# Patient Record
Sex: MALE | Race: WHITE | NOT HISPANIC OR LATINO | URBAN - METROPOLITAN AREA
[De-identification: names, ages, dates, MRNs, and addresses within clinical notes are randomized per-mention and may not be internally consistent; named-entity substitution may affect disease eponyms.]

---

## 2021-06-12 ENCOUNTER — INPATIENT (INPATIENT)
Facility: HOSPITAL | Age: 61
LOS: 3 days | Discharge: NURSING, REHAB OR LONG-TERM CARE | DRG: 389 | End: 2021-06-16
Attending: FAMILY MEDICINE | Admitting: FAMILY MEDICINE
Payer: MEDICARE

## 2021-06-12 VITALS
HEART RATE: 89 BPM | RESPIRATION RATE: 18 BRPM | OXYGEN SATURATION: 96 % | TEMPERATURE: 98 F | SYSTOLIC BLOOD PRESSURE: 150 MMHG | DIASTOLIC BLOOD PRESSURE: 89 MMHG | HEIGHT: 67 IN | WEIGHT: 210.1 LBS

## 2021-06-12 DIAGNOSIS — K56.7 ILEUS, UNSPECIFIED: ICD-10-CM

## 2021-06-12 DIAGNOSIS — R10.9 UNSPECIFIED ABDOMINAL PAIN: ICD-10-CM

## 2021-06-12 LAB
ALBUMIN SERPL ELPH-MCNC: 3.5 G/DL — SIGNIFICANT CHANGE UP (ref 3.3–5)
ALP SERPL-CCNC: 78 U/L — SIGNIFICANT CHANGE UP (ref 40–120)
ALT FLD-CCNC: 16 U/L — SIGNIFICANT CHANGE UP (ref 12–78)
ANION GAP SERPL CALC-SCNC: 5 MMOL/L — SIGNIFICANT CHANGE UP (ref 5–17)
APPEARANCE UR: CLEAR — SIGNIFICANT CHANGE UP
AST SERPL-CCNC: 33 U/L — SIGNIFICANT CHANGE UP (ref 15–37)
BASOPHILS # BLD AUTO: 0.03 K/UL — SIGNIFICANT CHANGE UP (ref 0–0.2)
BASOPHILS NFR BLD AUTO: 0.5 % — SIGNIFICANT CHANGE UP (ref 0–2)
BILIRUB SERPL-MCNC: 0.4 MG/DL — SIGNIFICANT CHANGE UP (ref 0.2–1.2)
BILIRUB UR-MCNC: NEGATIVE — SIGNIFICANT CHANGE UP
BUN SERPL-MCNC: 24 MG/DL — HIGH (ref 7–23)
CALCIUM SERPL-MCNC: 8.6 MG/DL — SIGNIFICANT CHANGE UP (ref 8.5–10.1)
CHLORIDE SERPL-SCNC: 108 MMOL/L — SIGNIFICANT CHANGE UP (ref 96–108)
CO2 SERPL-SCNC: 27 MMOL/L — SIGNIFICANT CHANGE UP (ref 22–31)
COLOR SPEC: YELLOW — SIGNIFICANT CHANGE UP
CREAT SERPL-MCNC: 1.1 MG/DL — SIGNIFICANT CHANGE UP (ref 0.5–1.3)
DIFF PNL FLD: NEGATIVE — SIGNIFICANT CHANGE UP
EOSINOPHIL # BLD AUTO: 0.02 K/UL — SIGNIFICANT CHANGE UP (ref 0–0.5)
EOSINOPHIL NFR BLD AUTO: 0.3 % — SIGNIFICANT CHANGE UP (ref 0–6)
GLUCOSE SERPL-MCNC: 118 MG/DL — HIGH (ref 70–99)
GLUCOSE UR QL: NEGATIVE — SIGNIFICANT CHANGE UP
HCT VFR BLD CALC: 50.9 % — HIGH (ref 39–50)
HGB BLD-MCNC: 16.7 G/DL — SIGNIFICANT CHANGE UP (ref 13–17)
IMM GRANULOCYTES NFR BLD AUTO: 0.6 % — SIGNIFICANT CHANGE UP (ref 0–1.5)
KETONES UR-MCNC: NEGATIVE — SIGNIFICANT CHANGE UP
LEUKOCYTE ESTERASE UR-ACNC: NEGATIVE — SIGNIFICANT CHANGE UP
LIDOCAIN IGE QN: 45 U/L — LOW (ref 73–393)
LYMPHOCYTES # BLD AUTO: 0.95 K/UL — LOW (ref 1–3.3)
LYMPHOCYTES # BLD AUTO: 14.6 % — SIGNIFICANT CHANGE UP (ref 13–44)
MCHC RBC-ENTMCNC: 30.4 PG — SIGNIFICANT CHANGE UP (ref 27–34)
MCHC RBC-ENTMCNC: 32.8 GM/DL — SIGNIFICANT CHANGE UP (ref 32–36)
MCV RBC AUTO: 92.5 FL — SIGNIFICANT CHANGE UP (ref 80–100)
MONOCYTES # BLD AUTO: 1 K/UL — HIGH (ref 0–0.9)
MONOCYTES NFR BLD AUTO: 15.4 % — HIGH (ref 2–14)
NEUTROPHILS # BLD AUTO: 4.45 K/UL — SIGNIFICANT CHANGE UP (ref 1.8–7.4)
NEUTROPHILS NFR BLD AUTO: 68.6 % — SIGNIFICANT CHANGE UP (ref 43–77)
NITRITE UR-MCNC: NEGATIVE — SIGNIFICANT CHANGE UP
NRBC # BLD: 0 /100 WBCS — SIGNIFICANT CHANGE UP (ref 0–0)
PH UR: 5 — SIGNIFICANT CHANGE UP (ref 5–8)
PLATELET # BLD AUTO: 195 K/UL — SIGNIFICANT CHANGE UP (ref 150–400)
POTASSIUM SERPL-MCNC: 4 MMOL/L — SIGNIFICANT CHANGE UP (ref 3.5–5.3)
POTASSIUM SERPL-SCNC: 4 MMOL/L — SIGNIFICANT CHANGE UP (ref 3.5–5.3)
PROT SERPL-MCNC: 8.6 G/DL — HIGH (ref 6–8.3)
PROT UR-MCNC: NEGATIVE — SIGNIFICANT CHANGE UP
RBC # BLD: 5.5 M/UL — SIGNIFICANT CHANGE UP (ref 4.2–5.8)
RBC # FLD: 13.8 % — SIGNIFICANT CHANGE UP (ref 10.3–14.5)
SARS-COV-2 RNA SPEC QL NAA+PROBE: SIGNIFICANT CHANGE UP
SODIUM SERPL-SCNC: 140 MMOL/L — SIGNIFICANT CHANGE UP (ref 135–145)
SP GR SPEC: 1.01 — SIGNIFICANT CHANGE UP (ref 1.01–1.02)
UROBILINOGEN FLD QL: NEGATIVE — SIGNIFICANT CHANGE UP
WBC # BLD: 6.49 K/UL — SIGNIFICANT CHANGE UP (ref 3.8–10.5)
WBC # FLD AUTO: 6.49 K/UL — SIGNIFICANT CHANGE UP (ref 3.8–10.5)

## 2021-06-12 PROCEDURE — 74176 CT ABD & PELVIS W/O CONTRAST: CPT | Mod: 26,MA

## 2021-06-12 PROCEDURE — 71045 X-RAY EXAM CHEST 1 VIEW: CPT | Mod: 26

## 2021-06-12 PROCEDURE — 76705 ECHO EXAM OF ABDOMEN: CPT | Mod: 26

## 2021-06-12 PROCEDURE — 99285 EMERGENCY DEPT VISIT HI MDM: CPT

## 2021-06-12 RX ORDER — DEXTROSE 50 % IN WATER 50 %
25 SYRINGE (ML) INTRAVENOUS ONCE
Refills: 0 | Status: DISCONTINUED | OUTPATIENT
Start: 2021-06-12 | End: 2021-06-16

## 2021-06-12 RX ORDER — DEXTROSE 50 % IN WATER 50 %
12.5 SYRINGE (ML) INTRAVENOUS ONCE
Refills: 0 | Status: DISCONTINUED | OUTPATIENT
Start: 2021-06-12 | End: 2021-06-16

## 2021-06-12 RX ORDER — OXYBUTYNIN CHLORIDE 5 MG
10 TABLET ORAL DAILY
Refills: 0 | Status: DISCONTINUED | OUTPATIENT
Start: 2021-06-12 | End: 2021-06-16

## 2021-06-12 RX ORDER — SODIUM CHLORIDE 9 MG/ML
1000 INJECTION INTRAMUSCULAR; INTRAVENOUS; SUBCUTANEOUS ONCE
Refills: 0 | Status: COMPLETED | OUTPATIENT
Start: 2021-06-12 | End: 2021-06-12

## 2021-06-12 RX ORDER — ONDANSETRON 8 MG/1
4 TABLET, FILM COATED ORAL ONCE
Refills: 0 | Status: COMPLETED | OUTPATIENT
Start: 2021-06-12 | End: 2021-06-12

## 2021-06-12 RX ORDER — PANTOPRAZOLE SODIUM 20 MG/1
40 TABLET, DELAYED RELEASE ORAL DAILY
Refills: 0 | Status: DISCONTINUED | OUTPATIENT
Start: 2021-06-12 | End: 2021-06-15

## 2021-06-12 RX ORDER — IOHEXOL 300 MG/ML
30 INJECTION, SOLUTION INTRAVENOUS ONCE
Refills: 0 | Status: COMPLETED | OUTPATIENT
Start: 2021-06-12 | End: 2021-06-12

## 2021-06-12 RX ORDER — DEXTROSE 50 % IN WATER 50 %
15 SYRINGE (ML) INTRAVENOUS ONCE
Refills: 0 | Status: DISCONTINUED | OUTPATIENT
Start: 2021-06-12 | End: 2021-06-16

## 2021-06-12 RX ORDER — VALPROIC ACID (AS SODIUM SALT) 250 MG/5ML
1000 SOLUTION, ORAL ORAL EVERY 8 HOURS
Refills: 0 | Status: DISCONTINUED | OUTPATIENT
Start: 2021-06-12 | End: 2021-06-12

## 2021-06-12 RX ORDER — OLANZAPINE 15 MG/1
10 TABLET, FILM COATED ORAL DAILY
Refills: 0 | Status: DISCONTINUED | OUTPATIENT
Start: 2021-06-12 | End: 2021-06-13

## 2021-06-12 RX ORDER — FAMOTIDINE 10 MG/ML
20 INJECTION INTRAVENOUS ONCE
Refills: 0 | Status: COMPLETED | OUTPATIENT
Start: 2021-06-12 | End: 2021-06-12

## 2021-06-12 RX ORDER — ONDANSETRON 8 MG/1
4 TABLET, FILM COATED ORAL
Refills: 0 | Status: DISCONTINUED | OUTPATIENT
Start: 2021-06-12 | End: 2021-06-16

## 2021-06-12 RX ORDER — GLUCAGON INJECTION, SOLUTION 0.5 MG/.1ML
1 INJECTION, SOLUTION SUBCUTANEOUS ONCE
Refills: 0 | Status: DISCONTINUED | OUTPATIENT
Start: 2021-06-12 | End: 2021-06-16

## 2021-06-12 RX ORDER — SODIUM CHLORIDE 9 MG/ML
1000 INJECTION, SOLUTION INTRAVENOUS
Refills: 0 | Status: DISCONTINUED | OUTPATIENT
Start: 2021-06-12 | End: 2021-06-16

## 2021-06-12 RX ORDER — VALPROIC ACID (AS SODIUM SALT) 250 MG/5ML
750 SOLUTION, ORAL ORAL EVERY 8 HOURS
Refills: 0 | Status: DISCONTINUED | OUTPATIENT
Start: 2021-06-12 | End: 2021-06-15

## 2021-06-12 RX ORDER — CARBAMAZEPINE 200 MG
400 TABLET ORAL
Refills: 0 | Status: DISCONTINUED | OUTPATIENT
Start: 2021-06-12 | End: 2021-06-16

## 2021-06-12 RX ORDER — LEVOTHYROXINE SODIUM 125 MCG
100 TABLET ORAL DAILY
Refills: 0 | Status: DISCONTINUED | OUTPATIENT
Start: 2021-06-12 | End: 2021-06-13

## 2021-06-12 RX ORDER — ARIPIPRAZOLE 15 MG/1
5 TABLET ORAL DAILY
Refills: 0 | Status: DISCONTINUED | OUTPATIENT
Start: 2021-06-12 | End: 2021-06-13

## 2021-06-12 RX ORDER — SODIUM CHLORIDE 9 MG/ML
1000 INJECTION, SOLUTION INTRAVENOUS
Refills: 0 | Status: DISCONTINUED | OUTPATIENT
Start: 2021-06-12 | End: 2021-06-14

## 2021-06-12 RX ORDER — ENOXAPARIN SODIUM 100 MG/ML
40 INJECTION SUBCUTANEOUS DAILY
Refills: 0 | Status: DISCONTINUED | OUTPATIENT
Start: 2021-06-12 | End: 2021-06-16

## 2021-06-12 RX ADMIN — ONDANSETRON 4 MILLIGRAM(S): 8 TABLET, FILM COATED ORAL at 11:52

## 2021-06-12 RX ADMIN — Medication 400 MILLIGRAM(S): at 18:31

## 2021-06-12 RX ADMIN — SODIUM CHLORIDE 100 MILLILITER(S): 9 INJECTION, SOLUTION INTRAVENOUS at 21:09

## 2021-06-12 RX ADMIN — ENOXAPARIN SODIUM 40 MILLIGRAM(S): 100 INJECTION SUBCUTANEOUS at 18:30

## 2021-06-12 RX ADMIN — IOHEXOL 30 MILLILITER(S): 300 INJECTION, SOLUTION INTRAVENOUS at 11:53

## 2021-06-12 RX ADMIN — SODIUM CHLORIDE 1000 MILLILITER(S): 9 INJECTION INTRAMUSCULAR; INTRAVENOUS; SUBCUTANEOUS at 11:53

## 2021-06-12 RX ADMIN — Medication 28.75 MILLIGRAM(S): at 21:09

## 2021-06-12 RX ADMIN — FAMOTIDINE 20 MILLIGRAM(S): 10 INJECTION INTRAVENOUS at 11:52

## 2021-06-12 RX ADMIN — SODIUM CHLORIDE 1000 MILLILITER(S): 9 INJECTION INTRAMUSCULAR; INTRAVENOUS; SUBCUTANEOUS at 13:15

## 2021-06-12 RX ADMIN — SODIUM CHLORIDE 100 MILLILITER(S): 9 INJECTION, SOLUTION INTRAVENOUS at 19:43

## 2021-06-12 NOTE — ED PROVIDER NOTE - OBJECTIVE STATEMENT
60 male presents to ER from group home by ambulance with report of abdominal pain, vomiting. Patient states on Thursday devloped "stomach upset", had 2 episodes of vomiting. 60 male from presents to ER from [facility] PMD Dr. Tohsa Delaney by ambulance with report of abdominal pain, vomiting. Patient states on Thursday devloped "stomach upset", had 2 episodes of vomiting.

## 2021-06-12 NOTE — CONSULT NOTE ADULT - ASSESSMENT
sbo  ileus    plan  f/u axr in am  zofran and reglan  surgery on the case  ppi once a day  gerd precautions  to follow up closely  may need colonosopcy to be done as outpt

## 2021-06-12 NOTE — H&P ADULT - HISTORY OF PRESENT ILLNESS
60 male pmhx of bipolar disorder, mild intellectual developmental disability, anxiety disorder, hypothyroidism, HLD, Vit D deficiency, GERD, osteopenia, BPH, IBS, lumbago, and eczema who presents today after 2 bouts of vomiting yesterday, and some mild abdominal pain.  He was a resident of an [facility] group home in [place], now lives with his mother for the past few months.  Patient displays mildly slowed mentation 2/2 his history, but is A&O x 3 and cooperative.  He states that the pain began a few days ago, while vomiting began last night - vomitus described as "clear-light colored" (denies blood/coffee-ground appearance).  Currently, states that abdominal pain has resolved.  When he was experiencing it, it was described as crampy, was felt in the upper quadrants, did not radiate, and rated 5/10.  Last BM was a few days ago, and admits to passing flatus all along.   Pt has been seen by gi and surgery   Asked to admit pt 6/12/2021     WORKUP   6/12/2021 W 6.3 Hb 16.7  6/12/2021 Na 140 K 4 CO2 27 Cr 1.1   LFTS 6/12/2021   ap 78  ast 33  alt 16   Lipase 6/12/2021 lipase 45     Home meds.   Tamsulosin  Vit D3  Levothyroxine  Olanzapine  Oxybutynin  Aripiprazole  Carbamazepine  Divalproex  Calcium with vit D  Fish oil  cetaphil lotion

## 2021-06-12 NOTE — H&P ADULT - NSHPREVIEWOFSYSTEMS_GEN_ALL_CORE
· GASTROINTESTINAL: - - -  · Gastrointestinal [+]: ABDOMINAL PAIN, VOMITING  · ROS STATEMENT: all other ROS negative except as per HPI

## 2021-06-12 NOTE — H&P ADULT - NSHPPHYSICALEXAM_GEN_ALL_CORE
· CONSTITUTIONAL: Well appearing, awake, alert, oriented to person, place, time/situation and in no apparent distress.  · CARDIAC: Normal rate, regular rhythm.  Heart sounds S1, S2.  No murmurs, rubs or gallops.  · RESPIRATORY: Breath sounds clear and equal bilaterally.  · GASTROINTESTINAL: Abdomen soft, non-tender, no guarding.  · MUSCULOSKELETAL: Spine appears normal, range of motion is not limited, no muscle or joint tenderness  · NEUROLOGICAL: Alert and oriented, no focal deficits, no motor or sensory deficits.  · SKIN: Skin normal color for race, warm, dry and intact. No evidence of rash.

## 2021-06-12 NOTE — CONSULT NOTE ADULT - SUBJECTIVE AND OBJECTIVE BOX
Chief Complaint:  Patient is a 60y old  Male who presents with a chief complaint of abd pain and ileus ?  · Chief Complaint: The patient is a 60y Male complaining of abdominal pain.  · HPI Objective Statement: 60 male from presents to ER from group home [facility] PMD Dr. Tosha Delaney by ambulance with report of abdominal pain, vomiting. Patient states on Thursday devloped "stomach upset", had 2 episodes of vomiting.    · Presenting Symptoms: PAIN, VOMITING  · Negative Findings: no blood in stool, no dysuria, no fever  · Location: epigastric area  · Area: generalized  · Radiation: no radiation  · Timing: gradual onset  · Duration: day(s)  2  · Quality: aching  · Severity: MODERATE  · Context: unknown  · Recent Exposure To: none known  · Aggravated Factors: none  · Relieving Factors: none      Allergies:  No Known Allergies      Medications:      PMHX/PSHX:  No pertinent past medical history        Family history:    no uc no cd     Social History:   no etoh no cigs no ivda   lives in group home    ROS:     General:  No wt loss, fevers, chills, night sweats, fatigue,   Eyes:  Good vision, no reported pain  ENT:  No sore throat, pain, runny nose, dysphagia  CV:  No pain, palpitations, hypo/hypertension  Resp:  No dyspnea, cough, tachypnea, wheezing  GI:  No pain, No nausea, No vomiting, No diarrhea, No constipation, No weight loss, No fever, No pruritis, No rectal bleeding, No tarry stools, No dysphagia,  :  No pain, bleeding, incontinence, nocturia  Muscle:  No pain, weakness  Neuro:  No weakness, tingling, memory problems  Psych:  No fatigue, insomnia, mood problems, depression  Endocrine:  No polyuria, polydipsia, cold/heat intolerance  Heme:  No petechiae, ecchymosis, easy bruisability  Skin:  No rash, tattoos, scars, edema      PHYSICAL EXAM:   Vital Signs:  Vital Signs Last 24 Hrs  T(C): 36.6 (12 Jun 2021 11:10), Max: 36.6 (12 Jun 2021 11:10)  T(F): 97.9 (12 Jun 2021 11:10), Max: 97.9 (12 Jun 2021 11:10)  HR: 89 (12 Jun 2021 11:10) (89 - 89)  BP: 150/89 (12 Jun 2021 11:10) (150/89 - 150/89)  BP(mean): --  RR: 18 (12 Jun 2021 11:10) (18 - 18)  SpO2: 96% (12 Jun 2021 11:10) (96% - 96%)  Daily Height in cm: 170.18 (12 Jun 2021 11:10)    Daily     GENERAL:  Appears stated age, well-groomed, well-nourished, no distress  HEENT:  NC/AT,  conjunctivae clear and pink, no thyromegaly, nodules, adenopathy, no JVD, sclera -anicteric  CHEST:  Full & symmetric excursion, no increased effort, breath sounds clear  HEART:  Regular rhythm, S1, S2, no murmur/rub/S3/S4, no abdominal bruit, no edema  ABDOMEN:  Soft, non-tender, non-distended, normoactive bowel sounds,  no masses ,no hepato-splenomegaly, no signs of chronic liver disease  EXTEREMITIES:  no cyanosis,clubbing or edema  SKIN:  No rash/erythema/ecchymoses/petechiae/wounds/abscess/warm/dry  NEURO:  Alert, oriented, no asterixis, no tremor, no encephalopathy    LABS:                        16.7   6.49  )-----------( 195      ( 12 Jun 2021 11:47 )             50.9     06-12    140  |  108  |  24<H>  ----------------------------<  118<H>  4.0   |  27  |  1.10    Ca    8.6      12 Jun 2021 11:47    TPro  8.6<H>  /  Alb  3.5  /  TBili  0.4  /  DBili  x   /  AST  33  /  ALT  16  /  AlkPhos  78  06-12    LIVER FUNCTIONS - ( 12 Jun 2021 11:47 )  Alb: 3.5 g/dL / Pro: 8.6 g/dL / ALK PHOS: 78 U/L / ALT: 16 U/L / AST: 33 U/L / GGT: x               Amylase Serum--      Lipase serum45       Ammonia--      Imaging:          
SURGERY PA CONSULT NOTE:    CHIEF COMPLAINT:  Patient is a 60-yo old male who presents with a chief complaint of abdominal pain and vomiting    HPI:   This is a pleasant 61 yo male with pmhx of bipolar disorder, mild intellectual developmental disability, anxiety disorder, hypothyroidism, HLD, Vit D deficiency, GERD, osteopenia, BPH, IBS, lumbago, and eczema who presents today after 2 bouts of vomiting yesterday, and some mild abdominal pain.  He was a resident of an [facility] group home in [place], now lives with his mother for the past few months.  Patient displays mildly slowed mentation 2/2 his history, but is A&O x 3 and cooperative.  He states that the pain began a few days ago, while vomiting began last night - vomitus described as "clear-light colored" (denies blood/coffee-ground appearance).  Currently, states that abdominal pain has resolved.  When he was experiencing it, it was described as crampy, was felt in the upper quadrants, did not radiate, and rated 5/10.  Last BM was a few days ago, and admits to passing flatus all along.      PAST MEDICAL HISTORY:  Mild Intellectual Developmental Disability  Bipolar Disorder  Anxiety Disorder  Hypothyroidism  Hyperlipidemia  Vit D Deficiency  GERD  Osteopenia  Urinary frequency/urge incontinence  Periodontal disease  Mild nearsightedness  Mild cataracts  Vitreal floaters  BPH  High frequency sensorineural hearing loss  Internal hemorrhoids  IBS  Lumbago  Eczema  Irritated seborrheic keratosis  Solar Lentigo    PAST SURGICAL HISTORY:  No significant surgical history reported    REVIEW OF SYSTEMS:  General/Constitutional: No acute distress, no weakness, fevers, or chills - +awake and alert  HEENT: Denies auditory or visual changes/disturbances, no vertigo, no throat pain, no dysphagia    Neck: Denies neck pain/stiffness, denies swelling/lumps/hoarseness   Lymphatic: Denies lumps or swelling in the axillae, groin, or neck bilaterally   Respiratory: Denies cough/hemoptysis, denies wheezing/SOB/dyspnea  Cardiac: Denies chest pain, palpitations  Abdomen: Denies abdominal bloating/fullness, nausea or vomiting, denies abdominal pain  Extremities: Denies sores, swelling, discoloration bilat UE/LE  Genitourinary: Denies urinary issues or complaints, denies dysuria/hematuria  Neuro: Denies weakness, paraesthesias, paralysis, syncope, loss of vision  Skin: Denies pruritus, pain, rashes  Psych: Denies hallucinations, visual disturbances, or depression    MEDICATIONS:  Tamsulosin  Vit D3  Levothyroxine  Olanzapine  Oxybutynin  Aripiprazole  Carbamazepine  Divalproex  Calcium with vit D  Fish oil  cetaphil lotion    ALLERGIES:  No Known Allergies    SOCIAL HISTORY:  Currently lives at home with mother  Visits [facility] 3x a week  Denies smoking hx  Denies ETOH or illicit drug use  Independently performs ADL's    FAMILY HISTORY:  Non-contributory     VITAL SIGNS:  Vital Signs Last 24 Hrs  T(C): 36.6 (12 Jun 2021 11:10), Max: 36.6 (12 Jun 2021 11:10)  T(F): 97.9 (12 Jun 2021 11:10), Max: 97.9 (12 Jun 2021 11:10)  HR: 89 (12 Jun 2021 11:10) (89 - 89)  BP: 150/89 (12 Jun 2021 11:10) (150/89 - 150/89)  RR: 18 (12 Jun 2021 11:10) (18 - 18)  SpO2: 96% (12 Jun 2021 11:10) (96% - 96%)    PHYSICAL EXAM:  General: No acute distress, appears comfortable, conversant, well nourished, well-groomed, appears stated age  Head, Eyes, Ears, Nose, Throat: Normal cephalic/atraumatic, VASQUEZ, EOMI, , anicteric, conjunctiva non injected and moist, vision grossly intact, hearing grossly intact, no nasal discharge, ears and nose symmetrical and atraumatic.  Nasal, oral, and oropharyngeal mucosa pink moist with no evidence of ulceration  Neck: Supple, carotids have good upstroke, trachea in the midline, without JVD or thyromegaly  Lymphatic: No evidence of masses or lymphadenopathy in the head, neck, trunk, axillary, inguinal, or supraclavicular regions  Chest: Lungs are clear to P&A, no wheezing, no rales, no ronchi, with good inspiratory effort  Heart: Heart rhythm regular, no murmurs  Abdomen: Mild tympani to percussion upper quadrants/epigastrium.  Soft, non tender throughout, good bowel sounds present in all four quadrants.  No guarding, rebound, and no peritoneal signs.  No evidence of hepatosplenomegaly.  No evidence of abdominal wall hernias.  Inguinal regions are unremarkable with no evidence of hernias.   Extremity: No swelling, or open sores, no gross deformities,  good range of motion, 2+ peripheral pulses bilat UE/LE, no edema,  negative Josh's sign, no lymphadenopathy  Neuro: Alert and oriented x3, motor and sensory intact  Psychiatric: Awake , alert, oriented x3 with an appropriate affect.   Skin: Good color, turgor, texture with no gross lesions, no eruptions, no rashes, no subcutaneous nodules and normal temperature.     LABS:                        16.7   6.49  )-----------( 195      ( 12 Jun 2021 11:47 )             50.9     06-12    140  |  108  |  24<H>  ----------------------------<  118<H>  4.0   |  27  |  1.10    Ca    8.6      12 Jun 2021 11:47    TPro  8.6<H>  /  Alb  3.5  /  TBili  0.4  /  DBili  x   /  AST  33  /  ALT  16  /  AlkPhos  78  06-12    LIVER FUNCTIONS - ( 12 Jun 2021 11:47 )  Alb: 3.5 g/dL / Pro: 8.6 g/dL / ALK PHOS: 78 U/L / ALT: 16 U/L / AST: 33 U/L / GGT: x           RADIOLOGY & ADDITIONAL STUDIES:  EXAM:  CT ABDOMEN AND PELVIS OC                        PROCEDURE DATE:  06/12/2021    INTERPRETATION:  CLINICAL INFORMATION: Abdominal atelectatic changes at the lung bases. And vomiting  COMPARISON: None.  CONTRAST/COMPLICATIONS:  IVContrast: NONE  0 cc administered   0 cc discarded  Oral Contrast: Omnipaque 300  Complications: None reported at time of study completion  PROCEDURE:  CT of the Abdomen and Pelvis was performed.  Sagittal and coronal reformats were performed.  FINDINGS:  LOWER CHEST: Within normal limits.  LIVER: Within normal limits.  BILE DUCTS: Normal caliber.  GALLBLADDER: Within normal limits.  SPLEEN: Within normal limits.  PANCREAS: Within normal limits.  ADRENALS: Within normal limits.  KIDNEYS/URETERS: Renal cysts measuring up to 4.3 cm in the midpole on the left.  BLADDER: Within normal limits.  REPRODUCTIVE ORGANS: Grossly unremarkable  BOWEL: Mildly distended proximal small bowel which is normal in caliber distally although no discrete transition is identified. This may be due to an ileus versus mild small bowel obstruction without a visualized transition. Clinical correlation and follow-up is recommended. No evidence for appendicitis.  PERITONEUM: Trace fluid in the pelvis.  VESSELS: Within normal limits.  RETROPERITONEUM/LYMPH NODES: No lymphadenopathy.  ABDOMINAL WALL: Within normal limits.  BONES: Degenerative changes of bone.  IMPRESSION:  Mildly distended small bowel which is normal in caliber distally with trace fluid in the pelvis. I am not observing a clear transition point. This may be a mild small bowel obstruction with a nonvisualized transition point versus an ileus. Please correlate clinically and follow-up is recommended.  TRISTIAN WITT MD; Attending Radiologist  This document has been electronically signed. Jun 12 2021  2:04PM

## 2021-06-12 NOTE — H&P ADULT - PROBLEM SELECTOR PLAN 1
6/12/2021 GI recommends axr am zofran reglan ppi may need colon as outpt   6/12/2021 surgery suspects ileus gastroparesis gastroenteritis or ileus   surgery felt that sbo at best questionable and no hepatobiliry or pancreatic problems based on patient data   Surgery recommends ng if vomiting continuesand no surgicl intervn planned at this point

## 2021-06-12 NOTE — CONSULT NOTE ADULT - ATTENDING COMMENTS
Patient seen and examined with surgical PA.  He states that he is feeling well and denies abdominal pain or nausea.  He states that he has been passing flatus, however has not had a bowel movement.  He is hungry.  The CT scan images and report were reviewed and do not reveal any transition points to support a diagnosis of a mechanical small bowel obstruction.  As such, suspect the gastric and mild proximal small bowel dilation likely represents either an ileus related to his antipsychotic medications versus gastroenteritis.  Will therefore defer further management to GI who has also been consulted.

## 2021-06-12 NOTE — CONSULT NOTE ADULT - ASSESSMENT
61 yo male with pmhx of bipolar disorder, mild intellectual developmental disability, anxiety disorder, hypothyroidism, HLD, Vit D deficiency, GERD, osteopenia, BPH, IBS, lumbago, and eczema  Presents with abdominal pain (now resolved) and 2 episodes of vomiting last night   CT questionable for possible SBO?    PLAN:  Patient is currently stable, without complaints  Afebrile with non-concerning VS  No leukocytosis  No suspicion of hepato-biliary or pancreatic pathology based on CT scan and laboratory findings  CT questionable at best for SBO - no clear transition point, and no jeramy mechanical obstruction is evident   Differential includes medication-induced ileus/gastroparesis, gastro-enteritis, or ileus  No acute surgical intervention is indicated at this time  Would maintain NPO status  Consider NGT if nausea/vomiting recurs  Recommend Medicine evaluation, and GI consultation  Discussed with Dr. Bland  If admitted, will follow for now

## 2021-06-12 NOTE — ED ADULT NURSE REASSESSMENT NOTE - NS ED NURSE REASSESS COMMENT FT1
Received patient from JOHNNY Owen resting in stretcher. A/Ox4 with NAD noted. VSS.  at this time. Patient speaking in full sentences and denies pain at this time. Will continue to monitor. Call light in reach.

## 2021-06-12 NOTE — H&P ADULT - ASSESSMENT
NOBLE BONILLA 60 M [facility] P 6/12/2021 1960 DR GALEN POE       Initial evaluation/Admission HNP  requested on 6/12/2021   by Dr Peo     from Dr Hurtado   Patient examined chart reviewed    HOSPITAL ADMISSION   PATIENT CAME  FROM (if information available)      NOBLE BONILLA 60 M [facility] P 6/12/2021 1960 DR GALEN POE      REVIEW OF SYMPTOMS      Able to give (reliable) ROS  NO     PHYSICAL EXAM    HEENT Unremarkable  atraumatic   RESP Fair air entry EXP prolonged    Harsh breath sound Resp distres mild   CARDIAC S1 S2 No S3     NO JVD    ABDOMEN SOFT BS PRESENT NOT DISTENDED No hepatosplenomegaly   PEDAL EDEMA present No calf tenderness  NO rash     DICKEYFREDDY Cohn [facility] P 6/12/2021 1960 DR GALEN POE      REVIEW OF SYMPTOMS      Able to give (reliable) ROS  NO     PHYSICAL EXAM    HEENT Unremarkable  atraumatic   RESP Fair air entry EXP prolonged    Harsh breath sound Resp distres mild   CARDIAC S1 S2 No S3     NO JVD    ABDOMEN SOFT BS PRESENT NOT DISTENDED No hepatosplenomegaly   PEDAL EDEMA present No calf tenderness  NO rash     PATIENT PRESENTATION.   60 male pmhx of bipolar disorder, mild intellectual developmental disability, anxiety disorder, hypothyroidism, HLD, Vit D deficiency, GERD, osteopenia, BPH, IBS, lumbago, and eczema who presents today after 2 bouts of vomiting yesterday, and some mild abdominal pain.  He was a resident of an [facility] group home in [place], now lives with his mother for the past few months.  Patient displays mildly slowed mentation 2/2 his history, but is A&O x 3 and cooperative.  He states that the pain began a few days ago, while vomiting began last night - vomitus described as "clear-light colored" (denies blood/coffee-ground appearance).  Currently, states that abdominal pain has resolved.  When he was experiencing it, it was described as crampy, was felt in the upper quadrants, did not radiate, and rated 5/10.  Last BM was a few days ago, and admits to passing flatus all along.   Pt has been seen by gi and surgery   Asked to admit pt 6/12/2021     WORKUP   6/12/2021 W 6.3 Hb 16.7  6/12/2021 Na 140 K 4 CO2 27 Cr 1.1   LFTS 6/12/2021   ap 78  ast 33  alt 16   Lipase 6/12/2021 lipase 45     Home meds.   Tamsulosin  Vit D3  Levothyroxine  Olanzapine  Oxybutynin  Aripiprazole  Carbamazepine  Divalproex  Calcium with vit D  Fish oil  cetaphil lotion      NOBLE BONILLA 60 M [facility] P 6/12/2021   PROBLEMS    PAIN ABOMEN VOMITING 2 D poa       PMH GERD   PMH BPH   PMH BIPOLAR  PMH MILD INTELLECTUAL DISABILITY .    MEDS.      HOME MED (6/12/2021)  D3 1000 (6/12/2021)  FISHOIL 2000 (6/12/2021)   LEVOXYL 100 (6/12/2021)   OLANZAPINE 10 (6/12/2021)   OXYBUTYNIN 10 (6/12/2021)   ABILIFY 5 (6/12/2021)   ARIPIPRAZLE 20 (6/12/2021)   CZL032 400 .2   CARBAMEZAPINE 400.2 (6/12/2021)   DEPAKOTE  (6/12/2021)   DEPAKOTE 500 (6/12/2021)   DEPAKOTE 1000.2 (6/12/2021)        GLOBAL AND BEST PRACTICE ISSUES                     ALLERGY.   nka    WEIGHT.          6/12/2021 95 k                       BMI.                        6/12/2021 32   ADVANCED DIRECTIVE.                               HEAD OF BED ELEVATION. Yes  DVT PROPHYLAXIS.        LVNX 40 96/12/2021)               STEIN PROPHYLAXIS. PROTONIX 40 96/12/2021)   APA.                                                                    DYSPHAGIA RECOMM.   DIET.    NPO (6/12/2021)   INFECTION PROPHYLAXIS.   FREE WATER.      PATIENT DATA                ABG.                 OXYGENATION.       6/12/2021 ra 96%             VITALS/IO/VENT/DRIPS.     6/12/2021 150/90 99 18 afeb     ASSESSMENT/RECOMMENDATIONS.    NEUROPSYCH MEDS  Cont     PAIN ABDOMEN VOMITING poa   CBC 6/12/2021 W 6.3 Hb 16.7  SMA7 6/12/2021 Na 140 K 4 CO2 27 Cr 1.1   LFTS 6/12/2021   ap 78  ast 33  alt 16   Lipase 6/12/2021 lipase 45   ctap oc 6/12/2021 mildly distended small bowel which is normal in caliber distlly with trace fluid in pelvis No clear tx point may be sbo with nonvisualized tx point v ileus  us abd 6/12/2021 n ruq us abd     A/R  6/12/2021 GI recommends axr am zofran reglan ppi may need colon as outpt   6/12/2021 surgery suspects ileus gastroparesis gastroenteritis or ileus   surgery felt that sbo at best questionable and no hepatobiliry or pancreatic problems based on patient data   Surgery recommends ng if vomiting continuesand no surgicl intervn planned at this point        OVERALL PLAN.  PAIN ABDOMEN VOMITING Observation npo zofran reglan ppi axr am iv fluids surgery gi followuo     TIME SPENT   Over 55 minutes aggregate care time spent on encounter; activities included   direct patient care, counseling and/or coordinating care reviewing notes, lab data/ imaging , discussion with multidisciplinary team/ patient  /family and explaining in detail risks, benefits, alternatives  of the recommendations     NOBLE BONILLA 60 M [facility] P 6/12/2021 1960 DR GALEN POE

## 2021-06-13 LAB
A1C WITH ESTIMATED AVERAGE GLUCOSE RESULT: 5.7 % — HIGH (ref 4–5.6)
ALBUMIN SERPL ELPH-MCNC: 2.7 G/DL — LOW (ref 3.3–5)
ALP SERPL-CCNC: 57 U/L — SIGNIFICANT CHANGE UP (ref 40–120)
ALT FLD-CCNC: 13 U/L — SIGNIFICANT CHANGE UP (ref 12–78)
ANION GAP SERPL CALC-SCNC: 9 MMOL/L — SIGNIFICANT CHANGE UP (ref 5–17)
AST SERPL-CCNC: 21 U/L — SIGNIFICANT CHANGE UP (ref 15–37)
BILIRUB DIRECT SERPL-MCNC: 0.1 MG/DL — SIGNIFICANT CHANGE UP (ref 0.05–0.2)
BILIRUB INDIRECT FLD-MCNC: 0.3 MG/DL — SIGNIFICANT CHANGE UP (ref 0.2–1)
BILIRUB SERPL-MCNC: 0.4 MG/DL — SIGNIFICANT CHANGE UP (ref 0.2–1.2)
BUN SERPL-MCNC: 13 MG/DL — SIGNIFICANT CHANGE UP (ref 7–23)
CALCIUM SERPL-MCNC: 7.7 MG/DL — LOW (ref 8.5–10.1)
CHLORIDE SERPL-SCNC: 112 MMOL/L — HIGH (ref 96–108)
CO2 SERPL-SCNC: 23 MMOL/L — SIGNIFICANT CHANGE UP (ref 22–31)
COVID-19 SPIKE DOMAIN AB INTERP: POSITIVE
COVID-19 SPIKE DOMAIN ANTIBODY RESULT: >250 U/ML — HIGH
CREAT SERPL-MCNC: 0.72 MG/DL — SIGNIFICANT CHANGE UP (ref 0.5–1.3)
CULTURE RESULTS: SIGNIFICANT CHANGE UP
ESTIMATED AVERAGE GLUCOSE: 117 MG/DL — HIGH (ref 68–114)
GLUCOSE SERPL-MCNC: 84 MG/DL — SIGNIFICANT CHANGE UP (ref 70–99)
HCT VFR BLD CALC: 43.5 % — SIGNIFICANT CHANGE UP (ref 39–50)
HCV AB S/CO SERPL IA: 0.16 S/CO — SIGNIFICANT CHANGE UP (ref 0–0.99)
HCV AB SERPL-IMP: SIGNIFICANT CHANGE UP
HGB BLD-MCNC: 14.1 G/DL — SIGNIFICANT CHANGE UP (ref 13–17)
INR BLD: 1.19 RATIO — HIGH (ref 0.88–1.16)
LACTATE SERPL-SCNC: 0.9 MMOL/L — SIGNIFICANT CHANGE UP (ref 0.7–2)
LIDOCAIN IGE QN: 113 U/L — SIGNIFICANT CHANGE UP (ref 73–393)
MCHC RBC-ENTMCNC: 30.3 PG — SIGNIFICANT CHANGE UP (ref 27–34)
MCHC RBC-ENTMCNC: 32.4 GM/DL — SIGNIFICANT CHANGE UP (ref 32–36)
MCV RBC AUTO: 93.5 FL — SIGNIFICANT CHANGE UP (ref 80–100)
NRBC # BLD: 0 /100 WBCS — SIGNIFICANT CHANGE UP (ref 0–0)
PHOSPHATE SERPL-MCNC: 1.5 MG/DL — LOW (ref 2.5–4.5)
PLATELET # BLD AUTO: 163 K/UL — SIGNIFICANT CHANGE UP (ref 150–400)
POTASSIUM SERPL-MCNC: 3.7 MMOL/L — SIGNIFICANT CHANGE UP (ref 3.5–5.3)
POTASSIUM SERPL-SCNC: 3.7 MMOL/L — SIGNIFICANT CHANGE UP (ref 3.5–5.3)
PROT SERPL-MCNC: 6.6 G/DL — SIGNIFICANT CHANGE UP (ref 6–8.3)
PROTHROM AB SERPL-ACNC: 13.8 SEC — HIGH (ref 10.6–13.6)
RBC # BLD: 4.65 M/UL — SIGNIFICANT CHANGE UP (ref 4.2–5.8)
RBC # FLD: 13.9 % — SIGNIFICANT CHANGE UP (ref 10.3–14.5)
SARS-COV-2 IGG+IGM SERPL QL IA: >250 U/ML — HIGH
SARS-COV-2 IGG+IGM SERPL QL IA: POSITIVE
SODIUM SERPL-SCNC: 144 MMOL/L — SIGNIFICANT CHANGE UP (ref 135–145)
SPECIMEN SOURCE: SIGNIFICANT CHANGE UP
TSH SERPL-MCNC: 0.81 UIU/ML — SIGNIFICANT CHANGE UP (ref 0.36–3.74)
VALPROATE SERPL-MCNC: 62 UG/ML — SIGNIFICANT CHANGE UP (ref 50–100)
WBC # BLD: 6.21 K/UL — SIGNIFICANT CHANGE UP (ref 3.8–10.5)
WBC # FLD AUTO: 6.21 K/UL — SIGNIFICANT CHANGE UP (ref 3.8–10.5)

## 2021-06-13 PROCEDURE — 99223 1ST HOSP IP/OBS HIGH 75: CPT

## 2021-06-13 PROCEDURE — 74018 RADEX ABDOMEN 1 VIEW: CPT | Mod: 26

## 2021-06-13 RX ORDER — ARIPIPRAZOLE 15 MG/1
20 TABLET ORAL ONCE
Refills: 0 | Status: COMPLETED | OUTPATIENT
Start: 2021-06-13 | End: 2021-06-13

## 2021-06-13 RX ORDER — ARIPIPRAZOLE 15 MG/1
25 TABLET ORAL
Refills: 0 | Status: DISCONTINUED | OUTPATIENT
Start: 2021-06-14 | End: 2021-06-16

## 2021-06-13 RX ORDER — LANOLIN/MINERAL OIL
1 LOTION (ML) TOPICAL
Qty: 0 | Refills: 0 | DISCHARGE

## 2021-06-13 RX ORDER — LEVOTHYROXINE SODIUM 125 MCG
100 TABLET ORAL
Refills: 0 | Status: DISCONTINUED | OUTPATIENT
Start: 2021-06-14 | End: 2021-06-16

## 2021-06-13 RX ORDER — OXYBUTYNIN CHLORIDE 5 MG
1 TABLET ORAL
Qty: 0 | Refills: 0 | DISCHARGE

## 2021-06-13 RX ORDER — LEVOTHYROXINE SODIUM 125 MCG
88 TABLET ORAL
Refills: 0 | Status: DISCONTINUED | OUTPATIENT
Start: 2021-06-14 | End: 2021-06-16

## 2021-06-13 RX ORDER — CARBAMAZEPINE 200 MG
1 TABLET ORAL
Qty: 0 | Refills: 0 | DISCHARGE

## 2021-06-13 RX ORDER — POTASSIUM PHOSPHATE, MONOBASIC POTASSIUM PHOSPHATE, DIBASIC 236; 224 MG/ML; MG/ML
30 INJECTION, SOLUTION INTRAVENOUS ONCE
Refills: 0 | Status: COMPLETED | OUTPATIENT
Start: 2021-06-13 | End: 2021-06-13

## 2021-06-13 RX ORDER — LEVOTHYROXINE SODIUM 125 MCG
88 TABLET ORAL ONCE
Refills: 0 | Status: COMPLETED | OUTPATIENT
Start: 2021-06-13 | End: 2021-06-13

## 2021-06-13 RX ORDER — TAMSULOSIN HYDROCHLORIDE 0.4 MG/1
0.4 CAPSULE ORAL AT BEDTIME
Refills: 0 | Status: DISCONTINUED | OUTPATIENT
Start: 2021-06-13 | End: 2021-06-16

## 2021-06-13 RX ORDER — OLANZAPINE 15 MG/1
10 TABLET, FILM COATED ORAL AT BEDTIME
Refills: 0 | Status: DISCONTINUED | OUTPATIENT
Start: 2021-06-14 | End: 2021-06-16

## 2021-06-13 RX ADMIN — SODIUM CHLORIDE 100 MILLILITER(S): 9 INJECTION, SOLUTION INTRAVENOUS at 14:40

## 2021-06-13 RX ADMIN — Medication 400 MILLIGRAM(S): at 05:27

## 2021-06-13 RX ADMIN — ENOXAPARIN SODIUM 40 MILLIGRAM(S): 100 INJECTION SUBCUTANEOUS at 12:16

## 2021-06-13 RX ADMIN — OLANZAPINE 10 MILLIGRAM(S): 15 TABLET, FILM COATED ORAL at 12:15

## 2021-06-13 RX ADMIN — Medication 28.75 MILLIGRAM(S): at 21:05

## 2021-06-13 RX ADMIN — Medication 28.75 MILLIGRAM(S): at 13:55

## 2021-06-13 RX ADMIN — SODIUM CHLORIDE 100 MILLILITER(S): 9 INJECTION, SOLUTION INTRAVENOUS at 20:23

## 2021-06-13 RX ADMIN — ARIPIPRAZOLE 5 MILLIGRAM(S): 15 TABLET ORAL at 12:14

## 2021-06-13 RX ADMIN — TAMSULOSIN HYDROCHLORIDE 0.4 MILLIGRAM(S): 0.4 CAPSULE ORAL at 21:05

## 2021-06-13 RX ADMIN — PANTOPRAZOLE SODIUM 40 MILLIGRAM(S): 20 TABLET, DELAYED RELEASE ORAL at 12:13

## 2021-06-13 RX ADMIN — Medication 10 MILLIGRAM(S): at 12:14

## 2021-06-13 RX ADMIN — Medication 28.75 MILLIGRAM(S): at 05:27

## 2021-06-13 RX ADMIN — Medication 400 MILLIGRAM(S): at 17:33

## 2021-06-13 RX ADMIN — POTASSIUM PHOSPHATE, MONOBASIC POTASSIUM PHOSPHATE, DIBASIC 83.33 MILLIMOLE(S): 236; 224 INJECTION, SOLUTION INTRAVENOUS at 20:23

## 2021-06-13 RX ADMIN — Medication 100 MICROGRAM(S): at 05:27

## 2021-06-13 NOTE — PHARMACOTHERAPY INTERVENTION NOTE - COMMENTS
Patient's medication reconciliation completed by pharmacy representative. Recent updates to outpatient medication regimen. Reached out to MD (Dr. Hurtado) to discuss updated regimen. Patient taking Olanzapine 10 mg at bedtime, abilify 25 mg daily, flomax 0.4 mg and synthroid 188 mcg at home. MD approved updates to regimen to reflect home medications. Orders adjusted per discussion.

## 2021-06-13 NOTE — PROGRESS NOTE ADULT - ASSESSMENT
[facility] CHAD 60 M [facility] P 6/12/2021 1958 DR GALEN ROSA      REVIEW OF SYMPTOMS      Able to give ROS  Yes     RELIABLE +/-   CONSTITUTIONAL Weakness Yes  Chills No   ENDOCRINE  No heat or cold intolerance    ALLERGY No hives  Sore throat No stridor  RESP Coughing blood no  Shortness of breath YES   NEURO No Headache  Confusion Pain neck No   CARDIAC No Chest pain No Palpitations   GI  Pain abdomen NO   Vomiting NO     PHYSICAL EXAM    HEENT Unremarkable  atraumatic   RESP Fair air entry EXP prolonged    Harsh breath sound Resp distres mild   CARDIAC S1 S2 No S3     NO JVD    ABDOMEN SOFT BS PRESENT NOT DISTENDED No hepatosplenomegaly PEDAL EDEMA present No calf tenderness  NO rash         [facility] CHAD 60 M [facility] P 6/12/2021   PROBLEMS    covid status   spkab 6/13/2021 250 (+)   pcr 612 (-)     PAIN ABOMEN VOMITING 2 D poa         PMH GERD   PMH BPH   PMH BIPOLAR  PMH MILD INTELLECTUAL DISABILITY .        GLOBAL AND BEST PRACTICE ISSUES                     ALLERGY.   nka    WEIGHT.          6/12/2021 95 k                       BMI.                        6/12/2021 32   ADVANCED DIRECTIVE.                               HEAD OF BED ELEVATION. Yes  DVT PROPHYLAXIS.        LVNX 40 96/12/2021)               STEIN PROPHYLAXIS. PROTONIX 40 96/12/2021)   APA.                                                                    DYSPHAGIA RECOMM.   DIET.    NPO (6/12/2021) -> clear liq (6/13/2021)   INFECTION PROPHYLAXIS.     PATIENT DATA                ABG.                 OXYGENATION.       6/13/2021 ra 93%   6/12/2021 ra 96%             VITALS/IO/VENT/DRIPS.     6/13/2021 afeb 87 130/70     ASSESSMENT/RECOMMENDATIONS.    NEUROPSYCH MEDS  Cont     PAIN ABDOMEN VOMITING poa   w 6/13/2021 w 6.2   CBC 6/12/2021 W 6.3 Hb 16.7  SMA7 6/12/2021 Na 140 K 4 CO2 27 Cr 1.1   LFTS 6/12/2021   ap 78  ast 33  alt 16   Lipase 6/12/2021 lipase 45   ctap oc 6/12/2021 mildly distended small bowel which is normal in caliber distlly with trace fluid in pelvis No clear tx point may be sbo with nonvisualized tx point v ileus  us abd 6/12/2021 n ruq us abd   urine c 6/11 (-)       A/R  6/12/2021 GI recommends axr am zofran reglan ppi may need colon as outpt   6/12/2021 surgery suspects ileus gastroparesis gastroenteritis or ileus   surgery felt that sbo at best questionable and no hepatobiliry or pancreatic problems based on patient data   Surgery recommends ng if vomiting continuesand no surgicl intervn planned at this point        OVERALL PLAN.  PAIN ABDOMEN VOMITING Observation npo zofran reglan ppi axr am iv fluids surgery gi followuo       TIME SPENT   Over 25 minutes aggregate care time spent on encounter; activities included   direct patient care, counseling and/or coordinating care reviewing notes, lab data/ imaging , discussion with multidisciplinary team/ patient  /family and explaining in detail risks, benefits, alternatives  of the recommendations     NOBLE BONILLA 60 M [facility] P 6/12/2021 1958 DR GALEN ROSA

## 2021-06-13 NOTE — PROGRESS NOTE ADULT - PROBLEM SELECTOR PLAN 1
60y Male p/w abdominal pain likely 2/2 ileus vs gastroenteritis. Presently passing flatus, but no BM. Pt afebrile w no leukocytosis.  - No indication for surgical intervention at this time  - F/up official read on AM AXR- air appears to be in the colon with some dilated loops of bowel  - Advance diet as tolerated, as per GI  - pain control, supportive care, OOB, incentive spirometer  - continue DVT ppx   - Cont care as per primary team  -Case discussed with Dr. Bland

## 2021-06-13 NOTE — PROGRESS NOTE ADULT - ASSESSMENT
sbo  ileus    plan  f/u axr in am  zofran and reglan  surgery on the case  ppi once a day  gerd precautions  to follow up closely  may need colonoscopy to be done as outpt     sbo  ileus    plan  f/u axr in am  zofran and reglan  surgery on the case  ppi once a day  gerd precautions  to follow up closely  may need colonoscopy to be done as outpt    Advanced care planning was discussed with patient and family.  Advanced care planning forms were reviewed and discussed.  Risks, benefits and alternatives of gastroenterologic procedures were discussed in detail and all questions were answered.    30 minutes spent.

## 2021-06-13 NOTE — PROGRESS NOTE ADULT - SUBJECTIVE AND OBJECTIVE BOX
SUBJECTIVE:  Patient seen and examined at bedside.  Patient with no new complaints at this time, states that he no longer has abdominal pain. Currently NPO, admits to passage of flatus but no bowel movement.  Patient denies any fevers, chills, chest pain, shortness of breath, abdominal pain, nausea, vomiting or diarrhea.    Vital Signs Last 24 Hrs  T(C): 36.7 (2021 05:02), Max: 37.1 (2021 17:29)  T(F): 98.1 (2021 05:02), Max: 98.8 (2021 17:29)  HR: 73 (2021 05:02) (73 - 88)  BP: 119/80 (2021 05:02) (119/80 - 151/85)  BP(mean): --  RR: 17 (2021 05:02) (16 - 18)  SpO2: 93% (2021 05:02) (93% - 96%)    PHYSICAL EXAM:  GENERAL: No acute distress, well-developed  HEAD:  Atraumatic, Normocephalic  ABDOMEN: Soft, protuberant, non-tender   NEUROLOGY: A&O x 3, no focal deficits    I&O's Summary    2021 07:01  -  2021 07:00  --------------------------------------------------------  IN: 1200 mL / OUT: 400 mL / NET: 800 mL    MEDICATIONS  (STANDING):  ARIPiprazole 5 milliGRAM(s) Oral daily  carBAMazepine ER (12-Hour) Oral Tablet - Peds 400 milliGRAM(s) Oral two times a day  dextrose 40% Gel 15 Gram(s) Oral once  dextrose 5% + sodium chloride 0.9%. 1000 milliLiter(s) (100 mL/Hr) IV Continuous <Continuous>  dextrose 5%. 1000 milliLiter(s) (50 mL/Hr) IV Continuous <Continuous>  dextrose 5%. 1000 milliLiter(s) (100 mL/Hr) IV Continuous <Continuous>  dextrose 50% Injectable 25 Gram(s) IV Push once  dextrose 50% Injectable 12.5 Gram(s) IV Push once  dextrose 50% Injectable 25 Gram(s) IV Push once  enoxaparin Injectable 40 milliGRAM(s) SubCutaneous daily  glucagon  Injectable 1 milliGRAM(s) IntraMuscular once  levothyroxine 100 MICROGram(s) Oral daily  OLANZapine 10 milliGRAM(s) Oral daily  oxybutynin 10 milliGRAM(s) Oral daily  pantoprazole  Injectable 40 milliGRAM(s) IV Push daily  valproate sodium IVPB 750 milliGRAM(s) IV Intermittent every 8 hours    MEDICATIONS  (PRN):  ondansetron Injectable 4 milliGRAM(s) IV Push four times a day PRN Nausea    LABS:                        14.1   6.21  )-----------( 163      ( 2021 09:06 )             43.5     06-13    144  |  112<H>  |  13  ----------------------------<  84  3.7   |  23  |  0.72    Ca    7.7<L>      2021 09:06  Phos  1.5     06-13    TPro  6.6  /  Alb  2.7<L>  /  TBili  0.4  /  DBili  .10  /  AST  21  /  ALT  13  /  AlkPhos  57  06-13    PT/INR - ( 2021 09:06 )   PT: 13.8 sec;   INR: 1.19 ratio      Urinalysis Basic - ( 2021 17:50 )    Color: Yellow / Appearance: Clear / S.015 / pH: x  Gluc: x / Ketone: Negative  / Bili: Negative / Urobili: Negative   Blood: x / Protein: Negative / Nitrite: Negative   Leuk Esterase: Negative / RBC: x / WBC x   Sq Epi: x / Non Sq Epi: x / Bacteria: x    RADIOLOGY & ADDITIONAL STUDIES:  < from: US Abdomen Limited (21 @ 14:35) >    FINDINGS:    Liver: Normal echogenicity, without focal mass or hepatomegaly. Main portal vein is patent, with hepatopedal flow.  Bile ducts: Normal caliber. Common hepatic duct measures 4 mm.  Gallbladder: No stones, wall thickening or pericholecystic fluid.  Negative sonographic Joyce's sign.  Pancreas: Poorly visualized.  Right kidney: 12.1 cm. No hydronephrosis, stone or mass.Normal  cortical echogenicity.  Ascites: No right upper quadrant ascites  IVC/Aorta: Visualized portions are within normal limits.    IMPRESSION:    Normal right upper quadrant abdominal ultrasound.

## 2021-06-13 NOTE — PROGRESS NOTE ADULT - SUBJECTIVE AND OBJECTIVE BOX
CHAD DICKEY    V 3WES 352 D1    Patient is a 60y old  Male who presents with a chief complaint of VOMITING (2021 10:22)       Allergies    No Known Allergies    Intolerances        HPI:  60 male pmhx of bipolar disorder, mild intellectual developmental disability, anxiety disorder, hypothyroidism, HLD, Vit D deficiency, GERD, osteopenia, BPH, IBS, lumbago, and eczema who presents today after 2 bouts of vomiting yesterday, and some mild abdominal pain.  He was a resident of an [facility] group home in [place], now lives with his mother for the past few months.  Patient displays mildly slowed mentation 2/2 his history, but is A&O x 3 and cooperative.  He states that the pain began a few days ago, while vomiting began last night - vomitus described as "clear-light colored" (denies blood/coffee-ground appearance).  Currently, states that abdominal pain has resolved.  When he was experiencing it, it was described as crampy, was felt in the upper quadrants, did not radiate, and rated 5/10.  Last BM was a few days ago, and admits to passing flatus all along.   Pt has been seen by gi and surgery   Asked to admit pt 2021     WORKUP   2021 W 6.3 Hb 16.7  2021 Na 140 K 4 CO2 27 Cr 1.1   LFTS 2021   ap 78  ast 33  alt 16   Lipase 2021 lipase 45     Home meds.   Tamsulosin  Vit D3  Levothyroxine  Olanzapine  Oxybutynin  Aripiprazole  Carbamazepine  Divalproex  Calcium with vit D  Fish oil  cetaphil lotion     (2021 17:36)      PAST MEDICAL & SURGICAL HISTORY:      FAMILY HISTORY:        MEDICATIONS   ARIPiprazole 5 milliGRAM(s) Oral daily  carBAMazepine ER (12-Hour) Oral Tablet - Peds 400 milliGRAM(s) Oral two times a day  dextrose 40% Gel 15 Gram(s) Oral once  dextrose 5% + sodium chloride 0.9%. 1000 milliLiter(s) IV Continuous <Continuous>  dextrose 5%. 1000 milliLiter(s) IV Continuous <Continuous>  dextrose 5%. 1000 milliLiter(s) IV Continuous <Continuous>  dextrose 50% Injectable 25 Gram(s) IV Push once  dextrose 50% Injectable 12.5 Gram(s) IV Push once  dextrose 50% Injectable 25 Gram(s) IV Push once  enoxaparin Injectable 40 milliGRAM(s) SubCutaneous daily  glucagon  Injectable 1 milliGRAM(s) IntraMuscular once  levothyroxine 100 MICROGram(s) Oral daily  OLANZapine 10 milliGRAM(s) Oral daily  ondansetron Injectable 4 milliGRAM(s) IV Push four times a day PRN  oxybutynin 10 milliGRAM(s) Oral daily  pantoprazole  Injectable 40 milliGRAM(s) IV Push daily  valproate sodium IVPB 750 milliGRAM(s) IV Intermittent every 8 hours      Vital Signs Last 24 Hrs  T(C): 36.7 (2021 05:02), Max: 37.1 (2021 17:29)  T(F): 98.1 (2021 05:02), Max: 98.8 (2021 17:29)  HR: 73 (2021 05:02) (73 - 89)  BP: 119/80 (2021 05:02) (119/80 - 151/85)  BP(mean): --  RR: 17 (2021 05:02) (16 - 18)  SpO2: 93% (2021 05:02) (93% - 96%)      21 @ 07:01  -  - @ 07:00  --------------------------------------------------------  IN: 1200 mL / OUT: 400 mL / NET: 800 mL            LABS:                        14.1   6.21  )-----------( 163      ( 2021 09:06 )             43.5     06-13    144  |  112<H>  |  13  ----------------------------<  84  3.7   |  23  |  0.72    Ca    7.7<L>      2021 09:06  Phos  1.5     06-13    TPro  6.6  /  Alb  2.7<L>  /  TBili  0.4  /  DBili  .10  /  AST  21  /  ALT  13  /  AlkPhos  57  06-13    PT/INR - ( 2021 09:06 )   PT: 13.8 sec;   INR: 1.19 ratio           Urinalysis Basic - ( 2021 17:50 )    Color: Yellow / Appearance: Clear / S.015 / pH: x  Gluc: x / Ketone: Negative  / Bili: Negative / Urobili: Negative   Blood: x / Protein: Negative / Nitrite: Negative   Leuk Esterase: Negative / RBC: x / WBC x   Sq Epi: x / Non Sq Epi: x / Bacteria: x            WBC:  WBC Count: 6.21 K/uL ( @ 09:06)  WBC Count: 6.49 K/uL ( @ 11:47)      MICROBIOLOGY:  RECENT CULTURES:              PT/INR - ( 2021 09:06 )   PT: 13.8 sec;   INR: 1.19 ratio             Sodium:  Sodium, Serum: 144 mmol/L ( @ 09:06)  Sodium, Serum: 140 mmol/L ( @ 11:47)      0.72 mg/dL  @ 09:06  1.10 mg/dL  @ 11:47      Hemoglobin:  Hemoglobin: 14.1 g/dL ( @ 09:06)  Hemoglobin: 16.7 g/dL ( @ 11:47)      Platelets: Platelet Count - Automated: 163 K/uL ( @ 09:06)  Platelet Count - Automated: 195 K/uL ( @ 11:47)      LIVER FUNCTIONS - ( 2021 09:06 )  Alb: 2.7 g/dL / Pro: 6.6 g/dL / ALK PHOS: 57 U/L / ALT: 13 U/L / AST: 21 U/L / GGT: x             Urinalysis Basic - ( 2021 17:50 )    Color: Yellow / Appearance: Clear / S.015 / pH: x  Gluc: x / Ketone: Negative  / Bili: Negative / Urobili: Negative   Blood: x / Protein: Negative / Nitrite: Negative   Leuk Esterase: Negative / RBC: x / WBC x   Sq Epi: x / Non Sq Epi: x / Bacteria: x        RADIOLOGY & ADDITIONAL STUDIES:

## 2021-06-13 NOTE — PROGRESS NOTE ADULT - SUBJECTIVE AND OBJECTIVE BOX
INTERVAL HPI/OVERNIGHT EVENTS:  Seen and examined this am  sitting in bed non toxic appearing   reports improvement in abd pain  pos flatus          MEDICATIONS  (STANDING):  ARIPiprazole 5 milliGRAM(s) Oral daily  carBAMazepine ER (12-Hour) Oral Tablet - Peds 400 milliGRAM(s) Oral two times a day  dextrose 40% Gel 15 Gram(s) Oral once  dextrose 5% + sodium chloride 0.9%. 1000 milliLiter(s) (100 mL/Hr) IV Continuous <Continuous>  dextrose 5%. 1000 milliLiter(s) (50 mL/Hr) IV Continuous <Continuous>  dextrose 5%. 1000 milliLiter(s) (100 mL/Hr) IV Continuous <Continuous>  dextrose 50% Injectable 25 Gram(s) IV Push once  dextrose 50% Injectable 12.5 Gram(s) IV Push once  dextrose 50% Injectable 25 Gram(s) IV Push once  enoxaparin Injectable 40 milliGRAM(s) SubCutaneous daily  glucagon  Injectable 1 milliGRAM(s) IntraMuscular once  levothyroxine 100 MICROGram(s) Oral daily  OLANZapine 10 milliGRAM(s) Oral daily  oxybutynin 10 milliGRAM(s) Oral daily  pantoprazole  Injectable 40 milliGRAM(s) IV Push daily  valproate sodium IVPB 750 milliGRAM(s) IV Intermittent every 8 hours    MEDICATIONS  (PRN):  ondansetron Injectable 4 milliGRAM(s) IV Push four times a day PRN Nausea      Allergies    No Known Allergies    Intolerances        Review of Systems:  General:  No wt loss, fevers, chills, night sweats, fatigue,   Eyes:  Good vision, no reported pain  ENT:  No sore throat, pain, runny nose, dysphagia  CV:  No pain, palpitations, hypo/hypertension  Resp:  No dyspnea, cough, tachypnea, wheezing  GI:  No pain, No nausea, No vomiting, No diarrhea, No constipation, No weight loss, No fever, No pruritis, No rectal bleeding, No tarry stools, No dysphagia,  :  No pain, bleeding, incontinence, nocturia  Muscle:  No pain, weakness  Neuro:  No weakness, tingling, memory problems  Psych:  No fatigue, insomnia, mood problems, depression  Endocrine:  No polyuria, polydipsia, cold/heat intolerance  Heme:  No petechiae, ecchymosis, easy bruisability  Skin:  No rash, tattoos, scars, edema      Vital Signs Last 24 Hrs  T(C): 36.7 (2021 05:02), Max: 37.1 (2021 17:29)  T(F): 98.1 (2021 05:02), Max: 98.8 (2021 17:29)  HR: 73 (2021 05:02) (73 - 89)  BP: 119/80 (2021 05:02) (119/80 - 151/85)  BP(mean): --  RR: 17 (2021 05:02) (16 - 18)  SpO2: 93% (2021 05:02) (93% - 96%)    PHYSICAL EXAM:    Constitutional: NAD  HEENT: EOMI, throat clear  Neck: No LAD, supple  Respiratory: CTA and P  Cardiovascular: S1 and S2, RRR, no M  Gastrointestinal: distended, tympany,  BS+, soft, NT, neg HSM,  Extremities: No peripheral edema, neg clubbing, cyanosis  Vascular: 2+ peripheral pulses  Neurological: A/O x 3, no focal deficits  Psychiatric: Normal mood, normal affect  Skin: No rashes      LABS:                        14.1   6.21  )-----------( 163      ( 2021 09:06 )             43.5     06-13    144  |  112<H>  |  13  ----------------------------<  84  3.7   |  23  |  0.72    Ca    7.7<L>      2021 09:06  Phos  1.5     06-13    TPro  6.6  /  Alb  2.7<L>  /  TBili  0.4  /  DBili  .10  /  AST  21  /  ALT  13  /  AlkPhos  57  06-13    PT/INR - ( 2021 09:06 )   PT: 13.8 sec;   INR: 1.19 ratio           Urinalysis Basic - ( 2021 17:50 )    Color: Yellow / Appearance: Clear / S.015 / pH: x  Gluc: x / Ketone: Negative  / Bili: Negative / Urobili: Negative   Blood: x / Protein: Negative / Nitrite: Negative   Leuk Esterase: Negative / RBC: x / WBC x   Sq Epi: x / Non Sq Epi: x / Bacteria: x        RADIOLOGY & ADDITIONAL TESTS:

## 2021-06-14 LAB
ALBUMIN SERPL ELPH-MCNC: 2.5 G/DL — LOW (ref 3.3–5)
ALP SERPL-CCNC: 55 U/L — SIGNIFICANT CHANGE UP (ref 40–120)
ALT FLD-CCNC: 14 U/L — SIGNIFICANT CHANGE UP (ref 12–78)
ANION GAP SERPL CALC-SCNC: 7 MMOL/L — SIGNIFICANT CHANGE UP (ref 5–17)
AST SERPL-CCNC: 23 U/L — SIGNIFICANT CHANGE UP (ref 15–37)
BILIRUB SERPL-MCNC: 0.3 MG/DL — SIGNIFICANT CHANGE UP (ref 0.2–1.2)
BUN SERPL-MCNC: 7 MG/DL — SIGNIFICANT CHANGE UP (ref 7–23)
CALCIUM SERPL-MCNC: 7.7 MG/DL — LOW (ref 8.5–10.1)
CHLORIDE SERPL-SCNC: 114 MMOL/L — HIGH (ref 96–108)
CO2 SERPL-SCNC: 25 MMOL/L — SIGNIFICANT CHANGE UP (ref 22–31)
CREAT SERPL-MCNC: 0.67 MG/DL — SIGNIFICANT CHANGE UP (ref 0.5–1.3)
GLUCOSE SERPL-MCNC: 104 MG/DL — HIGH (ref 70–99)
HCT VFR BLD CALC: 41.7 % — SIGNIFICANT CHANGE UP (ref 39–50)
HGB BLD-MCNC: 13.7 G/DL — SIGNIFICANT CHANGE UP (ref 13–17)
INR BLD: 1.21 RATIO — HIGH (ref 0.88–1.16)
MCHC RBC-ENTMCNC: 30.6 PG — SIGNIFICANT CHANGE UP (ref 27–34)
MCHC RBC-ENTMCNC: 32.9 GM/DL — SIGNIFICANT CHANGE UP (ref 32–36)
MCV RBC AUTO: 93.3 FL — SIGNIFICANT CHANGE UP (ref 80–100)
NRBC # BLD: 0 /100 WBCS — SIGNIFICANT CHANGE UP (ref 0–0)
PHOSPHATE SERPL-MCNC: 2.3 MG/DL — LOW (ref 2.5–4.5)
PLATELET # BLD AUTO: 153 K/UL — SIGNIFICANT CHANGE UP (ref 150–400)
POTASSIUM SERPL-MCNC: 3.6 MMOL/L — SIGNIFICANT CHANGE UP (ref 3.5–5.3)
POTASSIUM SERPL-SCNC: 3.6 MMOL/L — SIGNIFICANT CHANGE UP (ref 3.5–5.3)
PROT SERPL-MCNC: 6.5 G/DL — SIGNIFICANT CHANGE UP (ref 6–8.3)
PROTHROM AB SERPL-ACNC: 14 SEC — HIGH (ref 10.6–13.6)
RBC # BLD: 4.47 M/UL — SIGNIFICANT CHANGE UP (ref 4.2–5.8)
RBC # FLD: 13.3 % — SIGNIFICANT CHANGE UP (ref 10.3–14.5)
SODIUM SERPL-SCNC: 146 MMOL/L — HIGH (ref 135–145)
WBC # BLD: 5.92 K/UL — SIGNIFICANT CHANGE UP (ref 3.8–10.5)
WBC # FLD AUTO: 5.92 K/UL — SIGNIFICANT CHANGE UP (ref 3.8–10.5)

## 2021-06-14 PROCEDURE — 99234 HOSP IP/OBS SM DT SF/LOW 45: CPT

## 2021-06-14 PROCEDURE — 74018 RADEX ABDOMEN 1 VIEW: CPT | Mod: 26

## 2021-06-14 RX ORDER — POTASSIUM PHOSPHATE, MONOBASIC POTASSIUM PHOSPHATE, DIBASIC 236; 224 MG/ML; MG/ML
15 INJECTION, SOLUTION INTRAVENOUS ONCE
Refills: 0 | Status: COMPLETED | OUTPATIENT
Start: 2021-06-14 | End: 2021-06-14

## 2021-06-14 RX ORDER — SODIUM CHLORIDE 9 MG/ML
1000 INJECTION, SOLUTION INTRAVENOUS
Refills: 0 | Status: DISCONTINUED | OUTPATIENT
Start: 2021-06-14 | End: 2021-06-15

## 2021-06-14 RX ORDER — POLYETHYLENE GLYCOL 3350 17 G/17G
8.5 POWDER, FOR SOLUTION ORAL DAILY
Refills: 0 | Status: DISCONTINUED | OUTPATIENT
Start: 2021-06-14 | End: 2021-06-16

## 2021-06-14 RX ADMIN — Medication 28.75 MILLIGRAM(S): at 21:22

## 2021-06-14 RX ADMIN — POTASSIUM PHOSPHATE, MONOBASIC POTASSIUM PHOSPHATE, DIBASIC 62.5 MILLIMOLE(S): 236; 224 INJECTION, SOLUTION INTRAVENOUS at 20:31

## 2021-06-14 RX ADMIN — Medication 10 MILLIGRAM(S): at 11:35

## 2021-06-14 RX ADMIN — Medication 400 MILLIGRAM(S): at 17:44

## 2021-06-14 RX ADMIN — SODIUM CHLORIDE 100 MILLILITER(S): 9 INJECTION, SOLUTION INTRAVENOUS at 13:16

## 2021-06-14 RX ADMIN — SODIUM CHLORIDE 40 MILLILITER(S): 9 INJECTION, SOLUTION INTRAVENOUS at 18:56

## 2021-06-14 RX ADMIN — Medication 28.75 MILLIGRAM(S): at 13:16

## 2021-06-14 RX ADMIN — Medication 400 MILLIGRAM(S): at 05:46

## 2021-06-14 RX ADMIN — OLANZAPINE 10 MILLIGRAM(S): 15 TABLET, FILM COATED ORAL at 21:22

## 2021-06-14 RX ADMIN — TAMSULOSIN HYDROCHLORIDE 0.4 MILLIGRAM(S): 0.4 CAPSULE ORAL at 21:22

## 2021-06-14 RX ADMIN — Medication 28.75 MILLIGRAM(S): at 05:46

## 2021-06-14 RX ADMIN — ENOXAPARIN SODIUM 40 MILLIGRAM(S): 100 INJECTION SUBCUTANEOUS at 11:36

## 2021-06-14 RX ADMIN — PANTOPRAZOLE SODIUM 40 MILLIGRAM(S): 20 TABLET, DELAYED RELEASE ORAL at 11:37

## 2021-06-14 RX ADMIN — Medication 88 MICROGRAM(S): at 05:46

## 2021-06-14 RX ADMIN — Medication 100 MICROGRAM(S): at 05:46

## 2021-06-14 RX ADMIN — ARIPIPRAZOLE 25 MILLIGRAM(S): 15 TABLET ORAL at 05:46

## 2021-06-14 RX ADMIN — POLYETHYLENE GLYCOL 3350 8.5 GRAM(S): 17 POWDER, FOR SOLUTION ORAL at 21:22

## 2021-06-14 NOTE — PROGRESS NOTE ADULT - SUBJECTIVE AND OBJECTIVE BOX
SURGERY  Spectralink [id number]    Pt seen and examined. Pt denies any overnight events. Pt reports he feels hungry. Pt reports pain is well controlled. Pt reports passing flatus. Pt denies any nausea/vomiting. No BM. Pt reports ambulating with assistance. Tolerating clear liquid diet.    T(C): 37.2 (21 @ 05:05), Max: 37.2 (21 @ 05:05)  HR: 73 (21 @ 05:05) (58 - 73)  BP: 126/77 (21 @ 05:05) (124/71 - 128/78)  RR: 17 (21 @ 05:05) (17 - 18)  SpO2: 92% (21 @ 05:05) (92% - 96%)  Wt(kg): --  ICU Vital Signs Last 24 Hrs  T(C): 37.2 (2021 05:05), Max: 37.2 (2021 05:05)  T(F): 99 (2021 05:05), Max: 99 (2021 05:05)  HR: 73 (2021 05:05) (58 - 73)  BP: 126/77 (2021 05:05) (124/71 - 128/78)  BP(mean): --  ABP: --  ABP(mean): --  RR: 17 (2021 05:05) (17 - 18)  SpO2: 92% (2021 05:05) (92% - 96%)    CAPILLARY BLOOD GLUCOSE      POCT Blood Glucose.: 112 mg/dL (2021 06:13)  POCT Blood Glucose.: 80 mg/dL (2021 00:04)  POCT Blood Glucose.: 75 mg/dL (2021 18:41)  POCT Blood Glucose.: 91 mg/dL (2021 12:08)    I&O's Detail    2021 07:01  -  2021 07:00  --------------------------------------------------------  IN:    dextrose 5% + sodium chloride 0.9%: 1600 mL    IV PiggyBack: 150 mL    IV PiggyBack: 500 mL  Total IN: 2250 mL    OUT:    Voided (mL): 600 mL  Total OUT: 600 mL    Total NET: 1650 mL    Physical exam: Pt sitting comfortably in bed in NAD  Abdomen- Soft, non-tender, mildy distended/protuberant      LABS:                        13.7   5.92  )-----------( 153      ( 2021 09:07 )             41.7     06-14    146<H>  |  114<H>  |  7   ----------------------------<  104<H>  3.6   |  25  |  0.67    Ca    7.7<L>      2021 09:07  Phos  2.3     06-14    TPro  6.5  /  Alb  2.5<L>  /  TBili  0.3  /  DBili  x   /  AST  23  /  ALT  14  /  AlkPhos  55  06-14    PT/INR - ( 2021 09:07 )   PT: 14.0 sec;   INR: 1.21 ratio           Urinalysis Basic - ( 2021 17:50 )    Color: Yellow / Appearance: Clear / S.015 / pH: x  Gluc: x / Ketone: Negative  / Bili: Negative / Urobili: Negative   Blood: x / Protein: Negative / Nitrite: Negative   Leuk Esterase: Negative / RBC: x / WBC x   Sq Epi: x / Non Sq Epi: x / Bacteria: x    Culture - Urine (collected 2021 20:49)  Source: .Urine Clean Catch (Midstream)  Final Report (2021 19:03):    <10,000 CFU/mL Normal Urogenital Clara    60 male PMHx Bipolar disorder, mild intellectual developmental disability, anxiety disorder, hypothyroidism, HLD, Vit D deficiency, GERD, osteopenia, BPH, IBS, lumbago, and eczema, slowed mentation 2/2 his history, but is A&O x 3 and cooperative with Ileus vs. partial SBO- resolving    -Pt appears to be clinically improving, reports passing flatus, no n/v, AXR reveals decreased small bowel distention (prelim), no obvious evidence of obstruction/transition point, tolerating clear liquid diet- no surgical intervention indicated at this time  -Advance diet as tolerated per GI/Primary service  -Continue DVT Prophylaxis with SCD's & Lovenox  -Continue Incentive Spirometry  -Continue analgesia  -Encourage OOB/ambulation with assistance  -Monitor bowel function  -Pt seen and examined with Dr. Bland

## 2021-06-14 NOTE — PROGRESS NOTE ADULT - ATTENDING COMMENTS
Patient seen and examined with surgical PA.  He states that he is continuing to feel well and denies abdominal pain or nausea.  He has been tolerating clears liquids and is requesting a hamburger.  He has not had a bowel movement, however he has been continuing to pass flatus.  His abdomen remains soft and non-tender.  The abdominal x-rays from yesterday and today have not been reported, however the images were reviewed and demonstrate oral contrast throughout the colon.  As there were no transition points demonstrated on his initial CT scan, I suspect his mild small bowel dilation likely represented either an ileus related his antipsychotic medications versus gastroenteritis as opposed to a mechanical obstruction.  As he appears to be clinically improving and does not require surgical intervention, will sign off and defer further management to GI and the primary team.  Please call with questions.
Patient seen and examined with surgical PA.  He states that he is feeling well and denies abdominal pain or nausea.  He states that he has been passing flatus, however has not had a bowel movement.  He is hungry.  The CT scan images and report were reviewed and do not reveal any transition points to support a diagnosis of a mechanical small bowel obstruction.  As such, suspect the gastric and mild proximal small bowel dilation likely represents either an ileus related to his antipsychotic medications versus gastroenteritis.  Will therefore defer further management to GI who has also been consulted.

## 2021-06-14 NOTE — PROGRESS NOTE ADULT - SUBJECTIVE AND OBJECTIVE BOX
PAST MEDICAL & SURGICAL HISTORY:      MEDICATIONS  (STANDING):  ARIPiprazole 25 milliGRAM(s) Oral <User Schedule>  carBAMazepine ER (12-Hour) Oral Tablet - Peds 400 milliGRAM(s) Oral two times a day  dextrose 40% Gel 15 Gram(s) Oral once  dextrose 5%. 1000 milliLiter(s) (50 mL/Hr) IV Continuous <Continuous>  dextrose 5%. 1000 milliLiter(s) (100 mL/Hr) IV Continuous <Continuous>  dextrose 5%. 1000 milliLiter(s) (40 mL/Hr) IV Continuous <Continuous>  dextrose 50% Injectable 25 Gram(s) IV Push once  dextrose 50% Injectable 12.5 Gram(s) IV Push once  dextrose 50% Injectable 25 Gram(s) IV Push once  enoxaparin Injectable 40 milliGRAM(s) SubCutaneous daily  glucagon  Injectable 1 milliGRAM(s) IntraMuscular once  levothyroxine 88 MICROGram(s) Oral <User Schedule>  levothyroxine 100 MICROGram(s) Oral <User Schedule>  OLANZapine 10 milliGRAM(s) Oral at bedtime  oxybutynin 10 milliGRAM(s) Oral daily  pantoprazole  Injectable 40 milliGRAM(s) IV Push daily  potassium phosphate IVPB 15 milliMole(s) IV Intermittent once  tamsulosin 0.4 milliGRAM(s) Oral at bedtime  valproate sodium IVPB 750 milliGRAM(s) IV Intermittent every 8 hours    MEDICATIONS  (PRN):  ondansetron Injectable 4 milliGRAM(s) IV Push four times a day PRN Nausea      Patient is a 60y old  Male who presents with a chief complaint of VOMITING (14 Jun 2021 14:41)      Vital Signs Last 24 Hrs  T(C): 36.6 (14 Jun 2021 19:55), Max: 37.2 (14 Jun 2021 05:05)  T(F): 97.9 (14 Jun 2021 19:55), Max: 99 (14 Jun 2021 05:05)  HR: 67 (14 Jun 2021 19:55) (58 - 73)  BP: 131/73 (14 Jun 2021 19:55) (126/77 - 131/73)  BP(mean): --  RR: 18 (14 Jun 2021 19:55) (17 - 18)  SpO2: 95% (14 Jun 2021 19:55) (92% - 98%)          06-13 @ 07:01  -  06-14 @ 07:00  --------------------------------------------------------  IN: 2250 mL / OUT: 600 mL / NET: 1650 mL    06-14 @ 07:01 - 06-14 @ 20:19  --------------------------------------------------------  IN: 1350 mL / OUT: 0 mL / NET: 1350 mL        REVIEW OF SYSTEMS:    Constitutional: No fever, weight loss or fatigue  Eyes: No eye pain, visual disturbances, or discharge  ENT:  No difficulty hearing, tinnitus, vertigo; No sinus or throat pain  Neck: No pain or stiffness  Breasts: No pain, masses or nipple discharge  Respiratory: No cough, wheezing, chills or hemoptysis  Cardiovascular: No chest pain, palpitations, shortness of breath, dizziness or leg swelling  Gastrointestinal: No abdominal or epigastric pain. No nausea, vomiting or hematemesis; No diarrhea or constipation. No melena or hematochezia.  Genitourinary: No dysuria, frequency, hematuria or incontinence  Rectal: No pain, hemorrhoids or incontinence  Neurological: No headaches, memory loss, loss of strength, numbness or tremors  Skin: No itching, burning, rashes or lesions   Lymph Nodes: No enlarged glands  Endocrine: No heat or cold intolerance; No hair loss  Musculoskeletal: No joint pain or swelling; No muscle, back or extremity pain  Psychiatric: No depression, anxiety, mood swings or difficulty sleeping  Heme/Lymph: No easy bruising or bleeding gums  Allergy and Immunologic: No hives or eczema    PHYSICAL EXAM:    Constitutional: NAD, well-groomed, well-developed  HEENT: PERRLA, EOMI, Normal Hearing, MMM  Neck: No LAD, No JVD  Back: Normal spine flexure, No CVA tenderness  Respiratory: CTAB/L   Cardiovascular: S1 and S2, RRR, no M/G/R  Gastrointestinal: BS+, soft, NT/ND  Extremities: No peripheral edema  Vascular: 2+ peripheral pulses  Neurological: A/O x 3, no focal deficits  Skin: No rashes      decubiti:                           13.7   5.92  )-----------( 153      ( 14 Jun 2021 09:07 )             41.7     06-14    146<H>  |  114<H>  |  7   ----------------------------<  104<H>  3.6   |  25  |  0.67    Ca    7.7<L>      14 Jun 2021 09:07  Phos  2.3     06-14    TPro  6.5  /  Alb  2.5<L>  /  TBili  0.3  /  DBili  x   /  AST  23  /  ALT  14  /  AlkPhos  55  06-14    PT/INR - ( 14 Jun 2021 09:07 )   PT: 14.0 sec;   INR: 1.21 ratio                   .Urine Clean Catch (Midstream)  06-12 @ 20:49   <10,000 CFU/mL Normal Urogenital Clara  --  --        Urine Culture:  06-12 @ 20:49    --        <10,000 CFU/mL Normal Urogenital Clara        Radiology:     PAST MEDICAL & SURGICAL HISTORY:      MEDICATIONS  (STANDING):  ARIPiprazole 25 milliGRAM(s) Oral <User Schedule>  carBAMazepine ER (12-Hour) Oral Tablet - Peds 400 milliGRAM(s) Oral two times a day  dextrose 40% Gel 15 Gram(s) Oral once  dextrose 5%. 1000 milliLiter(s) (50 mL/Hr) IV Continuous <Continuous>  dextrose 5%. 1000 milliLiter(s) (100 mL/Hr) IV Continuous <Continuous>  dextrose 5%. 1000 milliLiter(s) (40 mL/Hr) IV Continuous <Continuous>  dextrose 50% Injectable 25 Gram(s) IV Push once  dextrose 50% Injectable 12.5 Gram(s) IV Push once  dextrose 50% Injectable 25 Gram(s) IV Push once  enoxaparin Injectable 40 milliGRAM(s) SubCutaneous daily  glucagon  Injectable 1 milliGRAM(s) IntraMuscular once  levothyroxine 88 MICROGram(s) Oral <User Schedule>  levothyroxine 100 MICROGram(s) Oral <User Schedule>  OLANZapine 10 milliGRAM(s) Oral at bedtime  oxybutynin 10 milliGRAM(s) Oral daily  pantoprazole  Injectable 40 milliGRAM(s) IV Push daily  potassium phosphate IVPB 15 milliMole(s) IV Intermittent once  tamsulosin 0.4 milliGRAM(s) Oral at bedtime  valproate sodium IVPB 750 milliGRAM(s) IV Intermittent every 8 hours    MEDICATIONS  (PRN):  ondansetron Injectable 4 milliGRAM(s) IV Push four times a day PRN Nausea      Patient is a 60y old  Male who presents with a chief complaint of VOMITING (14 Jun 2021 14:41)      Vital Signs Last 24 Hrs  T(C): 36.6 (14 Jun 2021 19:55), Max: 37.2 (14 Jun 2021 05:05)  T(F): 97.9 (14 Jun 2021 19:55), Max: 99 (14 Jun 2021 05:05)  HR: 67 (14 Jun 2021 19:55) (58 - 73)  BP: 131/73 (14 Jun 2021 19:55) (126/77 - 131/73)  BP(mean): --  RR: 18 (14 Jun 2021 19:55) (17 - 18)  SpO2: 95% (14 Jun 2021 19:55) (92% - 98%)          06-13 @ 07:01  -  06-14 @ 07:00  --------------------------------------------------------  IN: 2250 mL / OUT: 600 mL / NET: 1650 mL    06-14 @ 07:01 - 06-14 @ 20:19  --------------------------------------------------------  IN: 1350 mL / OUT: 0 mL / NET: 1350 mL        REVIEW OF SYSTEMS:    Constitutional: No fever, weight loss or fatigue  Eyes: No eye pain, visual disturbances, or discharge  ENT:  No difficulty hearing, tinnitus, vertigo; No sinus or throat pain  Neck: No pain or stiffness  Breasts: No pain, masses or nipple discharge  Respiratory: No cough, wheezing, chills or hemoptysis  Cardiovascular: No chest pain, palpitations, shortness of breath, dizziness or leg swelling  Gastrointestinal: No abdominal or epigastric pain. No nausea, vomiting or hematemesis; No diarrhea or constipation. No melena or hematochezia.  Genitourinary: No dysuria, frequency, hematuria or incontinence  Rectal: No pain, hemorrhoids or incontinence  Neurological: No headaches, memory loss, loss of strength, numbness or tremors  Skin: No itching, burning, rashes or lesions   Lymph Nodes: No enlarged glands  Endocrine: No heat or cold intolerance; No hair loss  Musculoskeletal: No joint pain or swelling; No muscle, back or extremity pain  Psychiatric: No depression, anxiety, mood swings or difficulty sleeping  Heme/Lymph: No easy bruising or bleeding gums  Allergy and Immunologic: No hives or eczema    PHYSICAL EXAM:alert  highly interactive  very pleasant  tolerating liq/soft diet  Constitutional: NAD, Respiratory: CTAB/L   Cardiovascular: S1 and S2, RRR, no M/G/R  Gastrointestinal: BS+, soft, NT/ND soft  Extremities: No peripheral edema  Neurological: alert  moves all ext  Skin: No rashes      decubiti: none                          13.7   5.92  )-----------( 153      ( 14 Jun 2021 09:07 )             41.7     06-14    146<H>  |  114<H>  |  7   ----------------------------<  104<H>  3.6   |  25  |  0.67    Ca    7.7<L>      14 Jun 2021 09:07  Phos  2.3     06-14    TPro  6.5  /  Alb  2.5<L>  /  TBili  0.3  /  DBili  x   /  AST  23  /  ALT  14  /  AlkPhos  55  06-14    PT/INR - ( 14 Jun 2021 09:07 )   PT: 14.0 sec;   INR: 1.21 ratio                   .Urine Clean Catch (Midstream)  06-12 @ 20:49   <10,000 CFU/mL Normal Urogenital Clara  --  --        Urine Culture:  06-12 @ 20:49    --        <10,000 CFU/mL Normal Urogenital Clara        Radiology:

## 2021-06-14 NOTE — PROGRESS NOTE ADULT - ASSESSMENT
sbo  ileus- resolving  AXR noted    plan    Zofran PRN  appreciate surgery recs  ppi once a day  gerd precautions  advance diet as tolerated    Shani Roca MD  Gastroenterology  [facility]  [address] [place] [address] [phone]

## 2021-06-14 NOTE — PROGRESS NOTE ADULT - SUBJECTIVE AND OBJECTIVE BOX
Chief Complaint:  Patient is a 60y old  Male who presents with a chief complaint of VOMITING (2021 11:54)      Interval Events:     no complaints  passed small amt flatus  tolerating clears    Hospital Medications:  ARIPiprazole 25 milliGRAM(s) Oral <User Schedule>  carBAMazepine ER (12-Hour) Oral Tablet - Peds 400 milliGRAM(s) Oral two times a day  dextrose 40% Gel 15 Gram(s) Oral once  dextrose 5% + sodium chloride 0.9%. 1000 milliLiter(s) IV Continuous <Continuous>  dextrose 5%. 1000 milliLiter(s) IV Continuous <Continuous>  dextrose 5%. 1000 milliLiter(s) IV Continuous <Continuous>  dextrose 50% Injectable 25 Gram(s) IV Push once  dextrose 50% Injectable 12.5 Gram(s) IV Push once  dextrose 50% Injectable 25 Gram(s) IV Push once  enoxaparin Injectable 40 milliGRAM(s) SubCutaneous daily  glucagon  Injectable 1 milliGRAM(s) IntraMuscular once  levothyroxine 88 MICROGram(s) Oral <User Schedule>  levothyroxine 100 MICROGram(s) Oral <User Schedule>  OLANZapine 10 milliGRAM(s) Oral at bedtime  ondansetron Injectable 4 milliGRAM(s) IV Push four times a day PRN  oxybutynin 10 milliGRAM(s) Oral daily  pantoprazole  Injectable 40 milliGRAM(s) IV Push daily  tamsulosin 0.4 milliGRAM(s) Oral at bedtime  valproate sodium IVPB 750 milliGRAM(s) IV Intermittent every 8 hours        PHYSICAL EXAM:   Vital Signs:  Vital Signs Last 24 Hrs  T(C): 36.5 (2021 12:43), Max: 37.2 (2021 05:05)  T(F): 97.7 (2021 12:43), Max: 99 (2021 05:05)  HR: 67 (2021 12:43) (58 - 73)  BP: 130/81 (2021 12:43) (126/77 - 130/81)  BP(mean): --  RR: 18 (2021 12:43) (17 - 18)  SpO2: 98% (2021 12:43) (92% - 98%)  Daily     Daily     GENERAL:  Appears stated age,  no distress  HEENT:   sclera -anicteric  CHEST:   no increased effort, breath sounds clear  HEART:  Regular rhythm, S1, S2,   ABDOMEN:  firm, tympanitic, non-tender, +distended, hyperactive bowel sounds,    EXTREMITIES:  no cyanosis, clubbing or edema  SKIN:  No rash/no jaundice   NEURO:  Alert, oriented    LABS:                        13.7   5.92  )-----------( 153      ( 2021 09:07 )             41.7     Mean Cell Volume: 93.3 fl (-21 @ 09:07)    -    146<H>  |  114<H>  |  7   ----------------------------<  104<H>  3.6   |  25  |  0.67    Ca    7.7<L>      2021 09:07  Phos  2.3     -    TPro  6.5  /  Alb  2.5<L>  /  TBili  0.3  /  DBili  x   /  AST  23  /  ALT  14  /  AlkPhos  55  -    LIVER FUNCTIONS - ( 2021 09:07 )  Alb: 2.5 g/dL / Pro: 6.5 g/dL / ALK PHOS: 55 U/L / ALT: 14 U/L / AST: 23 U/L / GGT: x           PT/INR - ( 2021 09:07 )   PT: 14.0 sec;   INR: 1.21 ratio           Urinalysis Basic - ( 2021 17:50 )    Color: Yellow / Appearance: Clear / S.015 / pH: x  Gluc: x / Ketone: Negative  / Bili: Negative / Urobili: Negative   Blood: x / Protein: Negative / Nitrite: Negative   Leuk Esterase: Negative / RBC: x / WBC x   Sq Epi: x / Non Sq Epi: x / Bacteria: x                              13.7   5.92  )-----------( 153      ( 2021 09:07 )             41.7                         14.1   6.21  )-----------( 163      ( 2021 09:06 )             43.5                         16.7   6.49  )-----------( 195      ( 2021 11:47 )             50.9     Imaging:    < from: Xray Abdomen 1 View PORTABLE -Routine (Xray Abdomen 1 View PORTABLE -Routine in AM.) (21 @ 10:53) >  EXAM:  XR ABDOMEN PORTABLE ROUTINE 1V                            PROCEDURE DATE:  2021          INTERPRETATION:  Abdomen one view    HISTORY: Ileus    Frontal view of the abdomen shows a normal gas pattern. Residual contrast material is present in the colon. There is no evidence of organomegaly. No abnormal calcifications are identified.  No definite free air is identified.    IMPRESSION:  No evidence of bowel obstruction or ileus.    Thank you for this referral.              REI CASTILLO MD; Attending Interventional Radiologist  This document has been electronically signed. 2021  2:23PM    < end of copied text >

## 2021-06-15 LAB
TSH SERPL-MCNC: 1.91 UIU/ML — SIGNIFICANT CHANGE UP (ref 0.36–3.74)
VIT B12 SERPL-MCNC: 690 PG/ML — SIGNIFICANT CHANGE UP (ref 232–1245)

## 2021-06-15 RX ORDER — GLYCERIN ADULT
1 SUPPOSITORY, RECTAL RECTAL ONCE
Refills: 0 | Status: COMPLETED | OUTPATIENT
Start: 2021-06-15 | End: 2021-06-15

## 2021-06-15 RX ORDER — OMEGA-3 ACID ETHYL ESTERS 1 G
1 CAPSULE ORAL
Qty: 0 | Refills: 0 | DISCHARGE

## 2021-06-15 RX ORDER — LEVOTHYROXINE SODIUM 125 MCG
1 TABLET ORAL
Qty: 0 | Refills: 0 | DISCHARGE

## 2021-06-15 RX ORDER — PANTOPRAZOLE SODIUM 20 MG/1
40 TABLET, DELAYED RELEASE ORAL
Refills: 0 | Status: DISCONTINUED | OUTPATIENT
Start: 2021-06-15 | End: 2021-06-16

## 2021-06-15 RX ORDER — SENNA PLUS 8.6 MG/1
2 TABLET ORAL AT BEDTIME
Refills: 0 | Status: DISCONTINUED | OUTPATIENT
Start: 2021-06-15 | End: 2021-06-16

## 2021-06-15 RX ORDER — OMEGA-3 ACID ETHYL ESTERS 1 G
2 CAPSULE ORAL
Qty: 0 | Refills: 0 | DISCHARGE

## 2021-06-15 RX ORDER — DIVALPROEX SODIUM 500 MG/1
750 TABLET, DELAYED RELEASE ORAL THREE TIMES A DAY
Refills: 0 | Status: DISCONTINUED | OUTPATIENT
Start: 2021-06-15 | End: 2021-06-16

## 2021-06-15 RX ADMIN — ARIPIPRAZOLE 25 MILLIGRAM(S): 15 TABLET ORAL at 05:31

## 2021-06-15 RX ADMIN — Medication 100 MICROGRAM(S): at 05:29

## 2021-06-15 RX ADMIN — Medication 1 SUPPOSITORY(S): at 16:53

## 2021-06-15 RX ADMIN — PANTOPRAZOLE SODIUM 40 MILLIGRAM(S): 20 TABLET, DELAYED RELEASE ORAL at 11:13

## 2021-06-15 RX ADMIN — ENOXAPARIN SODIUM 40 MILLIGRAM(S): 100 INJECTION SUBCUTANEOUS at 11:14

## 2021-06-15 RX ADMIN — Medication 28.75 MILLIGRAM(S): at 05:34

## 2021-06-15 RX ADMIN — Medication 10 MILLIGRAM(S): at 11:13

## 2021-06-15 RX ADMIN — TAMSULOSIN HYDROCHLORIDE 0.4 MILLIGRAM(S): 0.4 CAPSULE ORAL at 21:19

## 2021-06-15 RX ADMIN — DIVALPROEX SODIUM 750 MILLIGRAM(S): 500 TABLET, DELAYED RELEASE ORAL at 14:11

## 2021-06-15 RX ADMIN — Medication 400 MILLIGRAM(S): at 17:01

## 2021-06-15 RX ADMIN — SENNA PLUS 2 TABLET(S): 8.6 TABLET ORAL at 21:19

## 2021-06-15 RX ADMIN — OLANZAPINE 10 MILLIGRAM(S): 15 TABLET, FILM COATED ORAL at 21:18

## 2021-06-15 RX ADMIN — Medication 400 MILLIGRAM(S): at 05:31

## 2021-06-15 RX ADMIN — Medication 88 MICROGRAM(S): at 05:34

## 2021-06-15 RX ADMIN — POLYETHYLENE GLYCOL 3350 8.5 GRAM(S): 17 POWDER, FOR SOLUTION ORAL at 11:14

## 2021-06-15 RX ADMIN — DIVALPROEX SODIUM 750 MILLIGRAM(S): 500 TABLET, DELAYED RELEASE ORAL at 21:19

## 2021-06-15 NOTE — SWALLOW BEDSIDE ASSESSMENT ADULT - SWALLOW EVAL: RECOMMENDED FEEDING/EATING TECHNIQUES
allow for swallow between intakes/crush medication (when feasible)/maintain upright posture during/after eating for 30 mins/oral hygiene/position upright (90 degrees)/small sips/bites

## 2021-06-15 NOTE — SWALLOW BEDSIDE ASSESSMENT ADULT - ADDITIONAL RECOMMENDATIONS
This department to continue to follow during this admission to ensure diet tolerance, as schedule permits.

## 2021-06-15 NOTE — PROGRESS NOTE ADULT - ASSESSMENT
sbo  ileus- resolved  AXR noted    plan    Zofran PRN  appreciate surgery recs  ppi once a day  gerd precautions  advance diet as tolerated    Shani Roca MD  Gastroenterology  [facility]  [address] [place] [address] [phone]

## 2021-06-15 NOTE — PROGRESS NOTE ADULT - SUBJECTIVE AND OBJECTIVE BOX
Chief Complaint:  Patient is a 60y old  Male who presents with a chief complaint of VOMITING (14 Jun 2021 20:19)      Interval Events:   no o/n events    Hospital Medications:  ARIPiprazole 25 milliGRAM(s) Oral <User Schedule>  carBAMazepine ER (12-Hour) Oral Tablet - Peds 400 milliGRAM(s) Oral two times a day  dextrose 40% Gel 15 Gram(s) Oral once  dextrose 5%. 1000 milliLiter(s) IV Continuous <Continuous>  dextrose 5%. 1000 milliLiter(s) IV Continuous <Continuous>  dextrose 50% Injectable 25 Gram(s) IV Push once  dextrose 50% Injectable 12.5 Gram(s) IV Push once  dextrose 50% Injectable 25 Gram(s) IV Push once  diVALproex  milliGRAM(s) Oral three times a day  enoxaparin Injectable 40 milliGRAM(s) SubCutaneous daily  glucagon  Injectable 1 milliGRAM(s) IntraMuscular once  levothyroxine 88 MICROGram(s) Oral <User Schedule>  levothyroxine 100 MICROGram(s) Oral <User Schedule>  OLANZapine 10 milliGRAM(s) Oral at bedtime  ondansetron Injectable 4 milliGRAM(s) IV Push four times a day PRN  oxybutynin 10 milliGRAM(s) Oral daily  pantoprazole    Tablet 40 milliGRAM(s) Oral before breakfast  polyethylene glycol 3350 8.5 Gram(s) Oral daily  senna 2 Tablet(s) Oral at bedtime  tamsulosin 0.4 milliGRAM(s) Oral at bedtime        PHYSICAL EXAM:   Vital Signs:  Vital Signs Last 24 Hrs  T(C): 36.7 (15 Raymond 2021 12:44), Max: 36.7 (15 Raymond 2021 12:44)  T(F): 98.1 (15 Raymond 2021 12:44), Max: 98.1 (15 Raymond 2021 12:44)  HR: 77 (15 Raymond 2021 12:44) (63 - 77)  BP: 109/70 (15 Raymond 2021 12:44) (109/70 - 131/73)  BP(mean): --  RR: 18 (15 Raymond 2021 12:44) (18 - 18)  SpO2: 96% (15 Raymond 2021 12:44) (93% - 96%)  Daily     Daily     GENERAL:  Appears stated age,  no distress  HEENT:   sclera -anicteric  CHEST:   no increased effort, breath sounds clear  HEART:  Regular rhythm, S1, S2,   ABDOMEN:  Soft, non-tender, non-distended, normoactive bowel sounds,    EXTREMITIES:  no cyanosis, clubbing or edema  SKIN:  No rash/no jaundice   NEURO:  Alert, oriented    LABS:                        13.7   5.92  )-----------( 153      ( 14 Jun 2021 09:07 )             41.7       06-14    146<H>  |  114<H>  |  7   ----------------------------<  104<H>  3.6   |  25  |  0.67    Ca    7.7<L>      14 Jun 2021 09:07  Phos  2.3     06-14    TPro  6.5  /  Alb  2.5<L>  /  TBili  0.3  /  DBili  x   /  AST  23  /  ALT  14  /  AlkPhos  55  06-14    LIVER FUNCTIONS - ( 14 Jun 2021 09:07 )  Alb: 2.5 g/dL / Pro: 6.5 g/dL / ALK PHOS: 55 U/L / ALT: 14 U/L / AST: 23 U/L / GGT: x           PT/INR - ( 14 Jun 2021 09:07 )   PT: 14.0 sec;   INR: 1.21 ratio                                     13.7   5.92  )-----------( 153      ( 14 Jun 2021 09:07 )             41.7                         14.1   6.21  )-----------( 163      ( 13 Jun 2021 09:06 )             43.5     Imaging:  < from: Xray Abdomen 1 View PORTABLE -Routine (Xray Abdomen 1 View PORTABLE -Routine in AM.) (06.14.21 @ 10:10) >  EXAM:  XR ABDOMEN PORTABLE ROUTINE 1V                            PROCEDURE DATE:  06/14/2021          INTERPRETATION:  Abdomen one view    HISTORY: Small bowel obstruction    COMPARISON: 6/13/2021    Frontal view of the abdomen shows a normal gas pattern. Contrast material is again noted in the colon. No small bowel dilatation is identified. No definite free air is appreciated.    IMPRESSION:  No evidence of small bowel obstruction.    Thank you for this referral.            REI CASTILLO MD; Attending Interventional Radiologist  This document has been electronically signed. Raymond 15 2021  8:53AM    < end of copied text >

## 2021-06-15 NOTE — PROGRESS NOTE ADULT - SUBJECTIVE AND OBJECTIVE BOX
PAST MEDICAL & SURGICAL HISTORY:      MEDICATIONS  (STANDING):  ARIPiprazole 25 milliGRAM(s) Oral <User Schedule>  carBAMazepine ER (12-Hour) Oral Tablet - Peds 400 milliGRAM(s) Oral two times a day  dextrose 40% Gel 15 Gram(s) Oral once  dextrose 5%. 1000 milliLiter(s) (50 mL/Hr) IV Continuous <Continuous>  dextrose 5%. 1000 milliLiter(s) (100 mL/Hr) IV Continuous <Continuous>  dextrose 50% Injectable 25 Gram(s) IV Push once  dextrose 50% Injectable 12.5 Gram(s) IV Push once  dextrose 50% Injectable 25 Gram(s) IV Push once  diVALproex  milliGRAM(s) Oral three times a day  enoxaparin Injectable 40 milliGRAM(s) SubCutaneous daily  glucagon  Injectable 1 milliGRAM(s) IntraMuscular once  glycerin Suppository - Adult 1 Suppository(s) Rectal once  levothyroxine 88 MICROGram(s) Oral <User Schedule>  levothyroxine 100 MICROGram(s) Oral <User Schedule>  OLANZapine 10 milliGRAM(s) Oral at bedtime  oxybutynin 10 milliGRAM(s) Oral daily  pantoprazole    Tablet 40 milliGRAM(s) Oral before breakfast  polyethylene glycol 3350 8.5 Gram(s) Oral daily  senna 2 Tablet(s) Oral at bedtime  tamsulosin 0.4 milliGRAM(s) Oral at bedtime    MEDICATIONS  (PRN):  ondansetron Injectable 4 milliGRAM(s) IV Push four times a day PRN Nausea      Patient is a 60y old  Male who presents with a chief complaint of VOMITING (15 Raymond 2021 13:22)      Vital Signs Last 24 Hrs  T(C): 36.7 (15 Raymond 2021 12:44), Max: 36.7 (15 Raymond 2021 12:44)  T(F): 98.1 (15 Raymond 2021 12:44), Max: 98.1 (15 Raymond 2021 12:44)  HR: 77 (15 Raymond 2021 12:44) (63 - 77)  BP: 109/70 (15 Raymond 2021 12:44) (109/70 - 131/73)  BP(mean): --  RR: 18 (15 Raymond 2021 12:44) (18 - 18)  SpO2: 96% (15 Raymond 2021 12:44) (93% - 96%)          06-14 @ 07:01  -  06-15 @ 07:00  --------------------------------------------------------  IN: 2200 mL / OUT: 0 mL / NET: 2200 mL    06-15 @ 07:01  -  06-15 @ 15:49  --------------------------------------------------------  IN: 40 mL / OUT: 0 mL / NET: 40 mL        REVIEW OF SYSTEMS:    Constitutional: No fever, weight loss or fatigue  Eyes: No eye pain, visual disturbances, or discharge  ENT:  No difficulty hearing, tinnitus, vertigo; No sinus or throat pain  Neck: No pain or stiffness  Breasts: No pain, masses or nipple discharge  Respiratory: No cough, wheezing, chills or hemoptysis  Cardiovascular: No chest pain, palpitations, shortness of breath, dizziness or leg swelling  Gastrointestinal: No abdominal or epigastric pain. No nausea, vomiting or hematemesis; No diarrhea or constipation. No melena or hematochezia.  Genitourinary: No dysuria, frequency, hematuria or incontinence  Rectal: No pain, hemorrhoids or incontinence  Neurological: No headaches, memory loss, loss of strength, numbness or tremors  Skin: No itching, burning, rashes or lesions   Lymph Nodes: No enlarged glands  Endocrine: No heat or cold intolerance; No hair loss  Musculoskeletal: No joint pain or swelling; No muscle, back or extremity pain  Psychiatric: No depression, anxiety, mood swings or difficulty sleeping  Heme/Lymph: No easy bruising or bleeding gums  Allergy and Immunologic: No hives or eczema    PHYSICAL EXAM:    Constitutional: NAD, well-groomed, well-developed  HEENT: PERRLA, EOMI, Normal Hearing, MMM  Neck: No LAD, No JVD  Back: Normal spine flexure, No CVA tenderness  Respiratory: CTAB/L   Cardiovascular: S1 and S2, RRR, no M/G/R  Gastrointestinal: BS+, soft, NT/ND  Extremities: No peripheral edema  Vascular: 2+ peripheral pulses  Neurological: A/O x 3, no focal deficits  Skin: No rashes      decubiti:                           13.7   5.92  )-----------( 153      ( 14 Jun 2021 09:07 )             41.7     06-14    146<H>  |  114<H>  |  7   ----------------------------<  104<H>  3.6   |  25  |  0.67    Ca    7.7<L>      14 Jun 2021 09:07  Phos  2.3     06-14    TPro  6.5  /  Alb  2.5<L>  /  TBili  0.3  /  DBili  x   /  AST  23  /  ALT  14  /  AlkPhos  55  06-14    PT/INR - ( 14 Jun 2021 09:07 )   PT: 14.0 sec;   INR: 1.21 ratio                 06-15 @ 12:27    -  B12:  690 -    --    .Urine Clean Catch (Midstream)  06-12 @ 20:49   <10,000 CFU/mL Normal Urogenital Clara  --  --        Urine Culture:  06-12 @ 20:49    --        <10,000 CFU/mL Normal Urogenital Clara        Radiology:     PAST MEDICAL & SURGICAL HISTORY:      MEDICATIONS  (STANDING):  ARIPiprazole 25 milliGRAM(s) Oral <User Schedule>  carBAMazepine ER (12-Hour) Oral Tablet - Peds 400 milliGRAM(s) Oral two times a day  dextrose 40% Gel 15 Gram(s) Oral once  dextrose 5%. 1000 milliLiter(s) (50 mL/Hr) IV Continuous <Continuous>  dextrose 5%. 1000 milliLiter(s) (100 mL/Hr) IV Continuous <Continuous>  dextrose 50% Injectable 25 Gram(s) IV Push once  dextrose 50% Injectable 12.5 Gram(s) IV Push once  dextrose 50% Injectable 25 Gram(s) IV Push once  diVALproex  milliGRAM(s) Oral three times a day  enoxaparin Injectable 40 milliGRAM(s) SubCutaneous daily  glucagon  Injectable 1 milliGRAM(s) IntraMuscular once  glycerin Suppository - Adult 1 Suppository(s) Rectal once  levothyroxine 88 MICROGram(s) Oral <User Schedule>  levothyroxine 100 MICROGram(s) Oral <User Schedule>  OLANZapine 10 milliGRAM(s) Oral at bedtime  oxybutynin 10 milliGRAM(s) Oral daily  pantoprazole    Tablet 40 milliGRAM(s) Oral before breakfast  polyethylene glycol 3350 8.5 Gram(s) Oral daily  senna 2 Tablet(s) Oral at bedtime  tamsulosin 0.4 milliGRAM(s) Oral at bedtime    MEDICATIONS  (PRN):  ondansetron Injectable 4 milliGRAM(s) IV Push four times a day PRN Nausea      Patient is a 60y old  Male who presents with a chief complaint of VOMITING (15 Raymond 2021 13:22)      Vital Signs Last 24 Hrs  T(C): 36.7 (15 Raymond 2021 12:44), Max: 36.7 (15 Raymond 2021 12:44)  T(F): 98.1 (15 Raymond 2021 12:44), Max: 98.1 (15 Raymond 2021 12:44)  HR: 77 (15 Raymond 2021 12:44) (63 - 77)  BP: 109/70 (15 Raymond 2021 12:44) (109/70 - 131/73)  BP(mean): --  RR: 18 (15 Raymond 2021 12:44) (18 - 18)  SpO2: 96% (15 Raymond 2021 12:44) (93% - 96%)          06-14 @ 07:01  -  06-15 @ 07:00  --------------------------------------------------------  IN: 2200 mL / OUT: 0 mL / NET: 2200 mL    06-15 @ 07:01  -  06-15 @ 15:49  --------------------------------------------------------  IN: 40 mL / OUT: 0 mL / NET: 40 mL        REVIEW OF SYSTEMS:    Constitutional: No fever, weight loss or fatigue  Eyes: No eye pain, visual disturbances, or discharge  ENT:  No difficulty hearing, tinnitus, vertigo; No sinus or throat pain  Neck: No pain or stiffness  Breasts: No pain, masses or nipple discharge  Respiratory: No cough, wheezing, chills or hemoptysis  Cardiovascular: No chest pain, palpitations, shortness of breath, dizziness or leg swelling  Gastrointestinal: No abdominal or epigastric pain. No nausea, vomiting or hematemesis; No diarrhea or constipation. No melena or hematochezia.  Genitourinary: No dysuria, frequency, hematuria or incontinence  Rectal: No pain, hemorrhoids or incontinence  Neurological: No headaches, memory loss, loss of strength, numbness or tremors  Skin: No itching, burning, rashes or lesions   Lymph Nodes: No enlarged glands  Endocrine: No heat or cold intolerance; No hair loss  Musculoskeletal: No joint pain or swelling; No muscle, back or extremity pain  Psychiatric: No depression, anxiety, mood swings or difficulty sleeping  Heme/Lymph: No easy bruising or bleeding gums  Allergy and Immunologic: No hives or eczema    PHYSICAL EXAM:eating very well  responds to all questions appropriatley  inc flatus no bm yet  Constitutional: NAD,   Respiratory: CTAB/L   Cardiovascular: S1 and S2, RRR, no M/G/R  Gastrointestinal: BS+, soft, NT/ND soft  Extremities: No peripheral edema  moves all ext  Skin: No rashes      decubiti: no                          13.7   5.92  )-----------( 153      ( 14 Jun 2021 09:07 )             41.7     06-14    146<H>  |  114<H>  |  7   ----------------------------<  104<H>  3.6   |  25  |  0.67    Ca    7.7<L>      14 Jun 2021 09:07  Phos  2.3     06-14    TPro  6.5  /  Alb  2.5<L>  /  TBili  0.3  /  DBili  x   /  AST  23  /  ALT  14  /  AlkPhos  55  06-14    PT/INR - ( 14 Jun 2021 09:07 )   PT: 14.0 sec;   INR: 1.21 ratio                 06-15 @ 12:27    -  B12:  690 -    --    .Urine Clean Catch (Midstream)  06-12 @ 20:49   <10,000 CFU/mL Normal Urogenital Clara  --  --        Urine Culture:  06-12 @ 20:49    --        <10,000 CFU/mL Normal Urogenital Clara        Radiology:

## 2021-06-15 NOTE — DISCHARGE NOTE PROVIDER - NSDCCPCAREPLAN_GEN_ALL_CORE_FT
PRINCIPAL DISCHARGE DIAGNOSIS  Diagnosis: Ileus  Assessment and Plan of Treatment: resolved  eating very well  seen by gastro and surgery  no s/sx infection  no bowel obstruction  ct abd pelvis no masses obs  abd us negative  seen by speech,,normal  continue ALL HOME MEDICATIONS DIET AND ACTIVITY  fu with gastroenterology for outpatient colonoscopy  i had discussed this at length with his sister       PRINCIPAL DISCHARGE DIAGNOSIS  Diagnosis: Ileus  Assessment and Plan of Treatment: resolved  sp vomiting due to ileus  etiology unclear  eating very well  seen by gastro and surgery  no s/sx infection  no bowel obstruction  ct abd pelvis no masses obs  abd us negative  seen by speech,,normal  continue ALL HOME MEDICATIONS DIET AND ACTIVITY  fu with gastroenterology for outpatient colonoscopy  i had discussed this at length with his sister

## 2021-06-15 NOTE — DISCHARGE NOTE PROVIDER - CARE PROVIDER_API CALL
Tosha Delaney)  Internal Medicine  [address] [place] [address]  Phone: [phone]  Fax: [phone]  Follow Up Time: 1 week

## 2021-06-15 NOTE — SWALLOW BEDSIDE ASSESSMENT ADULT - COMMENTS
Consult received and chart reviewed. The patient was seen at chairside this AM for an initial assessment of swallow function, at which time he was alert and cooperative. The patient is requesting diet upgrade to regular solids. The patient is denying any pain at this time. RN reporting the patient has been tolerating the recommended diet without evidence of swallowing difficulties.    Per charting, the patient is a "60 male pmhx of bipolar disorder, mild intellectual developmental disability, anxiety disorder, hypothyroidism, HLD, Vit D deficiency, GERD, osteopenia, BPH, IBS, lumbago, and eczema who presents today after 2 bouts of vomiting yesterday, and some mild abdominal pain.  He was a resident of an [facility] group home in [place], now lives with his mother for the past few months. Patient with sbo ileus- resolving."    WBC is WFL. CXR revealed, "No evidence of active chest disease." Xray of the abdomen revealed, "No evidence of small bowel obstruction."    Discussed results and recommendations from this evaluation with the patient, RN, and call out to MD.
Airway patent, Nasal mucosa clear. Mouth with normal mucosa. Throat has no vesicles, no oropharyngeal exudates and uvula is midline.

## 2021-06-15 NOTE — DISCHARGE NOTE PROVIDER - NSDCMRMEDTOKEN_GEN_ALL_CORE_FT
ARIPiprazole 10 mg oral tablet: 0.5 tab(s) orally once a day (in the morning)      *Takes along with 20mg for a TDD: 25mg *  ARIPiprazole 20 mg oral tablet: 1 tab(s) orally once a day (in the morning)    *Takes along with 5mg for a TDD: 25mg*  Calcium 600+D oral tablet: 1 tab(s) orally 2 times a day  carBAMazepine 400 mg oral tablet, extended release: 1 tab(s) orally 2 times a day  Cetaphil topical lotion: Apply topically to affected area 2 times a day  divalproex sodium 250 mg oral delayed release tablet: 1 tab(s) orally once a day (in the morning)    *Takes along with 500mg for a TDD in the morninmg   divalproex sodium 500 mg oral delayed release tablet: 1 tab(s) orally once a day (in the morning)    *Takes along with 250mg for a TDD in the AM: 750mg *  divalproex sodium 500 mg oral delayed release tablet: 2 tab(s) orally 2 times a day at 3pm and 8pm   Fish Oil 1000 mg oral capsule: 1 cap(s) orally once a day (in the morning) go back to usual home dose  levothyroxine 100 mcg (0.1 mg) oral tablet: 1 tab(s) orally once a day     *Takes with 88mcg (1hr apart) for a TDD; 188mcg* follow home protocol  levothyroxine 88 mcg (0.088 mg) oral tablet: 1 tab(s) orally once a day    *Takes with 100mcg (1hr apart) for a TDD; 188mcg * follow home protocol  OLANZapine 20 mg oral tablet: 0.5 tab(s) orally once a day (in the evening)  oxybutynin 10 mg/24 hr oral tablet, extended release: 1 tab(s) orally once a day  tamsulosin 0.4 mg oral capsule: 1 cap(s) orally once a day (at bedtime)  Vitamin D3 1000 intl units (25 mcg) oral tablet:

## 2021-06-15 NOTE — SWALLOW BEDSIDE ASSESSMENT ADULT - SLP PATIENT PROFILE REVIEW
Speech Treatment Note    Today's date: 3/30/2021  Patient name: Minor Pitts  : 1965  MRN: [id number]  Referring provider: Meaghan Kovacs*  Dx:   Encounter Diagnosis     ICD-10-CM    1  Pharyngeal dysphagia  R13 13    2  History of head and neck cancer  Z85 89        Start Time: 0900  Stop Time: 0945  Total time in clinic (min): 45 minutes     Recommendations:       Patients would benefit from: Dysphagia therapy including NMES       Patient would benefit from VBSS  Frequency:2x weekly       Duration:Other 3 months     Intervention certification NFMW:3/0/34  Intervention certification [id number]  Intervention Comments: dysphagia therapy, MFR, NMES  Visit Number: 7      Subjective/Behavioral: Patient arrived late to appointment today  Not change since yesterday  Goals     Short Term Goals:  1  Patient will improve timing of swallow response with liquids in 90% of opportunities across 3 sessions  2  Patient will improve tongue base retraction for improve propulsion of food based on results of next VBSS  3   Patient will improve hyolaryngeal rise for improved swallow in 90+% of opportunities across 3 sessions  3   Patient will improve airway protection based on result next VBSS        Long Term Goals:  1  Patient will tolerate least restrictive diet without s/s of aspiration by discharge  2   Patient will utilize strategies with meals for swallowing without s/s of aspiration independently by discharge        Patient tolerated NMES (min threshold: 2 0  mA & max threshold: channel 1: 10 0 channel 2: 14 0 mA) in conjunction with PO intake and exercises       Traditional VitalStim     Channel(s) used: 1,2   Placement: 2b   Duty Cycle: 57/1 s   Frequency: 80 pulses per second   Phase Duration: 300 microseconds   Treatment Duration: 31 minutes      Average mA - Ch 1: 9 9, Ch 2 : 13 8    Utilized coban to maintain contact throughout        Patient consumed the following during todays session: Water - approximately 16 oz via bottle; flavored diced pear cup in fruit juice        Patient reports feeling improvements at home  Appropriate oral stage swallowing; No neck extension observed today  Patient with prolonged mastication  Patient used piecemeal transfer with multiple swallows per bolus  Patient used water for liquid wash after each bite of mixed consistency  He was noted to separate liquids/solids with the mixed consistency - noted x2 throat clear/cough - observed immediately following phonation during multiple swallows with bolus  Continued improvement  Reviewed carryover of tongue base retraction exercises- patient reports some carryover  Patient provided written/visual cues for swallowing therapy exercises for task completion with carryover  Patient able to complete with visual and verbal cues  Also initiated effortful and kasie swallows x5 each during today's NMES intervention  Patient was able to complete given direction  Provided written directions  Assessment: Noted some overt s/s of aspiration during PO trials  Continues with moderate to severe pharyngeal dysphagia  He is currently at risk for aspiration  Other: Patient was educated throughout the session about POC and intervention strategies      Recommendations:Continue with Plan of Care yes

## 2021-06-15 NOTE — PROGRESS NOTE ADULT - PROBLEM SELECTOR PLAN 1
sp ileus  no sbo  scans /ruq us negative  eating very well  no s/sx infection  seen by surgery and gastro  I spoke to mom and sister at length sp ileus  no sbo  scans /ruq us negative  eating very well  no s/sx infection  seen by surgery and gastro  seen by speech swallow,,,all normal  I spoke to mom and sister at length

## 2021-06-15 NOTE — DISCHARGE NOTE PROVIDER - HOSPITAL COURSE
came in nausea/vomiting  ct abd + dilated loops  seen by gastro and surgery  no bowel obstruction,,,,diet was advanced,,,seen by speech,,,,no dysphagia  diet was advanced  no s/sx infection  all home medications diet and activity are the same  fu with gastro as out patient for eval for colonoscopy  i had discussed this at length with his sister

## 2021-06-16 VITALS
DIASTOLIC BLOOD PRESSURE: 78 MMHG | OXYGEN SATURATION: 93 % | RESPIRATION RATE: 18 BRPM | HEART RATE: 71 BPM | SYSTOLIC BLOOD PRESSURE: 142 MMHG | TEMPERATURE: 98 F

## 2021-06-16 PROCEDURE — 82607 VITAMIN B-12: CPT

## 2021-06-16 PROCEDURE — U0003: CPT

## 2021-06-16 PROCEDURE — 85025 COMPLETE CBC W/AUTO DIFF WBC: CPT

## 2021-06-16 PROCEDURE — 81003 URINALYSIS AUTO W/O SCOPE: CPT

## 2021-06-16 PROCEDURE — 82962 GLUCOSE BLOOD TEST: CPT

## 2021-06-16 PROCEDURE — 80053 COMPREHEN METABOLIC PANEL: CPT

## 2021-06-16 PROCEDURE — 84100 ASSAY OF PHOSPHORUS: CPT

## 2021-06-16 PROCEDURE — 82248 BILIRUBIN DIRECT: CPT

## 2021-06-16 PROCEDURE — 84443 ASSAY THYROID STIM HORMONE: CPT

## 2021-06-16 PROCEDURE — 92610 EVALUATE SWALLOWING FUNCTION: CPT

## 2021-06-16 PROCEDURE — 92526 ORAL FUNCTION THERAPY: CPT

## 2021-06-16 PROCEDURE — 71045 X-RAY EXAM CHEST 1 VIEW: CPT

## 2021-06-16 PROCEDURE — 83605 ASSAY OF LACTIC ACID: CPT

## 2021-06-16 PROCEDURE — 76705 ECHO EXAM OF ABDOMEN: CPT

## 2021-06-16 PROCEDURE — 74176 CT ABD & PELVIS W/O CONTRAST: CPT

## 2021-06-16 PROCEDURE — 80164 ASSAY DIPROPYLACETIC ACD TOT: CPT

## 2021-06-16 PROCEDURE — 87086 URINE CULTURE/COLONY COUNT: CPT

## 2021-06-16 PROCEDURE — U0005: CPT

## 2021-06-16 PROCEDURE — 83036 HEMOGLOBIN GLYCOSYLATED A1C: CPT

## 2021-06-16 PROCEDURE — 99285 EMERGENCY DEPT VISIT HI MDM: CPT

## 2021-06-16 PROCEDURE — 85610 PROTHROMBIN TIME: CPT

## 2021-06-16 PROCEDURE — 85027 COMPLETE CBC AUTOMATED: CPT

## 2021-06-16 PROCEDURE — 86769 SARS-COV-2 COVID-19 ANTIBODY: CPT

## 2021-06-16 PROCEDURE — 74018 RADEX ABDOMEN 1 VIEW: CPT

## 2021-06-16 PROCEDURE — 86803 HEPATITIS C AB TEST: CPT

## 2021-06-16 PROCEDURE — 83690 ASSAY OF LIPASE: CPT

## 2021-06-16 PROCEDURE — 36415 COLL VENOUS BLD VENIPUNCTURE: CPT

## 2021-06-16 RX ADMIN — Medication 100 MICROGRAM(S): at 06:09

## 2021-06-16 RX ADMIN — Medication 10 MILLIGRAM(S): at 12:10

## 2021-06-16 RX ADMIN — DIVALPROEX SODIUM 750 MILLIGRAM(S): 500 TABLET, DELAYED RELEASE ORAL at 06:08

## 2021-06-16 RX ADMIN — ARIPIPRAZOLE 25 MILLIGRAM(S): 15 TABLET ORAL at 06:08

## 2021-06-16 RX ADMIN — Medication 88 MICROGRAM(S): at 06:09

## 2021-06-16 RX ADMIN — POLYETHYLENE GLYCOL 3350 8.5 GRAM(S): 17 POWDER, FOR SOLUTION ORAL at 12:10

## 2021-06-16 RX ADMIN — ENOXAPARIN SODIUM 40 MILLIGRAM(S): 100 INJECTION SUBCUTANEOUS at 12:11

## 2021-06-16 RX ADMIN — DIVALPROEX SODIUM 750 MILLIGRAM(S): 500 TABLET, DELAYED RELEASE ORAL at 13:18

## 2021-06-16 RX ADMIN — PANTOPRAZOLE SODIUM 40 MILLIGRAM(S): 20 TABLET, DELAYED RELEASE ORAL at 06:09

## 2021-06-16 RX ADMIN — Medication 400 MILLIGRAM(S): at 06:08

## 2021-06-16 NOTE — PROGRESS NOTE ADULT - SUBJECTIVE AND OBJECTIVE BOX
Chief Complaint:  Patient is a 60y old  Male who presents with a chief complaint of VOMITING (15 Raymond 2021 19:39)      Interval Events:     no complaints    Hospital Medications:  ARIPiprazole 25 milliGRAM(s) Oral <User Schedule>  carBAMazepine ER (12-Hour) Oral Tablet - Peds 400 milliGRAM(s) Oral two times a day  dextrose 40% Gel 15 Gram(s) Oral once  dextrose 5%. 1000 milliLiter(s) IV Continuous <Continuous>  dextrose 5%. 1000 milliLiter(s) IV Continuous <Continuous>  dextrose 50% Injectable 25 Gram(s) IV Push once  dextrose 50% Injectable 12.5 Gram(s) IV Push once  dextrose 50% Injectable 25 Gram(s) IV Push once  diVALproex  milliGRAM(s) Oral three times a day  enoxaparin Injectable 40 milliGRAM(s) SubCutaneous daily  glucagon  Injectable 1 milliGRAM(s) IntraMuscular once  levothyroxine 88 MICROGram(s) Oral <User Schedule>  levothyroxine 100 MICROGram(s) Oral <User Schedule>  OLANZapine 10 milliGRAM(s) Oral at bedtime  ondansetron Injectable 4 milliGRAM(s) IV Push four times a day PRN  oxybutynin 10 milliGRAM(s) Oral daily  pantoprazole    Tablet 40 milliGRAM(s) Oral before breakfast  polyethylene glycol 3350 8.5 Gram(s) Oral daily  senna 2 Tablet(s) Oral at bedtime  tamsulosin 0.4 milliGRAM(s) Oral at bedtime        PHYSICAL EXAM:   Vital Signs:  Vital Signs Last 24 Hrs  T(C): 36.6 (2021 04:27), Max: 36.7 (15 Raymond 2021 12:44)  T(F): 97.8 (2021 04:27), Max: 98.1 (15 Raymond 2021 12:44)  HR: 70 (2021 04:27) (70 - 80)  BP: 145/84 (2021 04:27) (109/70 - 156/82)  BP(mean): --  RR: 18 (2021 04:27) (18 - 18)  SpO2: 92% (2021 04:27) (92% - 96%)  Daily     Daily Weight in k.5 (2021 04:27)    GENERAL:  Appears stated age,  no distress  HEENT:   sclera -anicteric  CHEST:   no increased effort, breath sounds clear  HEART:  Regular rhythm, S1, S2,   ABDOMEN:  Soft, non-tender, non-distended, normoactive bowel sounds,    EXTREMITIES:  no cyanosis, clubbing or edema  SKIN:  No rash/no jaundice   NEURO:  Alert, oriented    LABS:                                          13.7   5.92  )-----------( 153      ( 2021 09:07 )             41.7     Imaging:

## 2021-06-16 NOTE — DISCHARGE NOTE NURSING/CASE MANAGEMENT/SOCIAL WORK - PATIENT PORTAL LINK FT
You can access the FollowMyHealth Patient Portal offered by [facility] by registering at the following website: [email or web address]. By joining "[employer]’s FollowMyHealth portal, you will also be able to view your health information using other applications (apps) compatible with our system.

## 2021-12-26 ENCOUNTER — INPATIENT (INPATIENT)
Facility: HOSPITAL | Age: 61
LOS: 11 days | Discharge: NURSING, REHAB OR LONG-TERM CARE | DRG: 177 | End: 2022-01-07
Attending: FAMILY MEDICINE | Admitting: FAMILY MEDICINE
Payer: MEDICARE

## 2021-12-26 VITALS
HEIGHT: 67 IN | WEIGHT: 207.9 LBS | SYSTOLIC BLOOD PRESSURE: 138 MMHG | TEMPERATURE: 100 F | RESPIRATION RATE: 18 BRPM | OXYGEN SATURATION: 92 % | HEART RATE: 100 BPM | DIASTOLIC BLOOD PRESSURE: 68 MMHG

## 2021-12-26 DIAGNOSIS — U07.1 COVID-19: ICD-10-CM

## 2021-12-26 LAB
ALBUMIN SERPL ELPH-MCNC: 3.1 G/DL — LOW (ref 3.3–5)
ALP SERPL-CCNC: 53 U/L — SIGNIFICANT CHANGE UP (ref 40–120)
ALT FLD-CCNC: 39 U/L — SIGNIFICANT CHANGE UP (ref 12–78)
ANION GAP SERPL CALC-SCNC: 10 MMOL/L — SIGNIFICANT CHANGE UP (ref 5–17)
AST SERPL-CCNC: 149 U/L — HIGH (ref 15–37)
BASOPHILS # BLD AUTO: 0.01 K/UL — SIGNIFICANT CHANGE UP (ref 0–0.2)
BASOPHILS NFR BLD AUTO: 0.2 % — SIGNIFICANT CHANGE UP (ref 0–2)
BILIRUB SERPL-MCNC: 0.4 MG/DL — SIGNIFICANT CHANGE UP (ref 0.2–1.2)
BUN SERPL-MCNC: 24 MG/DL — HIGH (ref 7–23)
CALCIUM SERPL-MCNC: 8.8 MG/DL — SIGNIFICANT CHANGE UP (ref 8.5–10.1)
CHLORIDE SERPL-SCNC: 102 MMOL/L — SIGNIFICANT CHANGE UP (ref 96–108)
CO2 SERPL-SCNC: 25 MMOL/L — SIGNIFICANT CHANGE UP (ref 22–31)
CREAT SERPL-MCNC: 1.4 MG/DL — HIGH (ref 0.5–1.3)
EOSINOPHIL # BLD AUTO: 0 K/UL — SIGNIFICANT CHANGE UP (ref 0–0.5)
EOSINOPHIL NFR BLD AUTO: 0 % — SIGNIFICANT CHANGE UP (ref 0–6)
FLUAV AG NPH QL: SIGNIFICANT CHANGE UP
FLUBV AG NPH QL: SIGNIFICANT CHANGE UP
GLUCOSE SERPL-MCNC: 83 MG/DL — SIGNIFICANT CHANGE UP (ref 70–99)
HCT VFR BLD CALC: 44.1 % — SIGNIFICANT CHANGE UP (ref 39–50)
HGB BLD-MCNC: 14.8 G/DL — SIGNIFICANT CHANGE UP (ref 13–17)
IMM GRANULOCYTES NFR BLD AUTO: 0.5 % — SIGNIFICANT CHANGE UP (ref 0–1.5)
LYMPHOCYTES # BLD AUTO: 0.99 K/UL — LOW (ref 1–3.3)
LYMPHOCYTES # BLD AUTO: 15.2 % — SIGNIFICANT CHANGE UP (ref 13–44)
MCHC RBC-ENTMCNC: 30.6 PG — SIGNIFICANT CHANGE UP (ref 27–34)
MCHC RBC-ENTMCNC: 33.6 GM/DL — SIGNIFICANT CHANGE UP (ref 32–36)
MCV RBC AUTO: 91.3 FL — SIGNIFICANT CHANGE UP (ref 80–100)
MONOCYTES # BLD AUTO: 0.97 K/UL — HIGH (ref 0–0.9)
MONOCYTES NFR BLD AUTO: 14.9 % — HIGH (ref 2–14)
NEUTROPHILS # BLD AUTO: 4.51 K/UL — SIGNIFICANT CHANGE UP (ref 1.8–7.4)
NEUTROPHILS NFR BLD AUTO: 69.2 % — SIGNIFICANT CHANGE UP (ref 43–77)
NRBC # BLD: 0 /100 WBCS — SIGNIFICANT CHANGE UP (ref 0–0)
PLATELET # BLD AUTO: 172 K/UL — SIGNIFICANT CHANGE UP (ref 150–400)
POTASSIUM SERPL-MCNC: 4.2 MMOL/L — SIGNIFICANT CHANGE UP (ref 3.5–5.3)
POTASSIUM SERPL-SCNC: 4.2 MMOL/L — SIGNIFICANT CHANGE UP (ref 3.5–5.3)
PROT SERPL-MCNC: 8.3 G/DL — SIGNIFICANT CHANGE UP (ref 6–8.3)
RBC # BLD: 4.83 M/UL — SIGNIFICANT CHANGE UP (ref 4.2–5.8)
RBC # FLD: 13 % — SIGNIFICANT CHANGE UP (ref 10.3–14.5)
RSV RNA NPH QL NAA+NON-PROBE: SIGNIFICANT CHANGE UP
SARS-COV-2 RNA SPEC QL NAA+PROBE: DETECTED
SODIUM SERPL-SCNC: 137 MMOL/L — SIGNIFICANT CHANGE UP (ref 135–145)
WBC # BLD: 6.51 K/UL — SIGNIFICANT CHANGE UP (ref 3.8–10.5)
WBC # FLD AUTO: 6.51 K/UL — SIGNIFICANT CHANGE UP (ref 3.8–10.5)

## 2021-12-26 PROCEDURE — 99285 EMERGENCY DEPT VISIT HI MDM: CPT | Mod: CS

## 2021-12-26 PROCEDURE — 71045 X-RAY EXAM CHEST 1 VIEW: CPT | Mod: 26

## 2021-12-26 PROCEDURE — 93010 ELECTROCARDIOGRAM REPORT: CPT

## 2021-12-26 RX ORDER — PANTOPRAZOLE SODIUM 20 MG/1
40 TABLET, DELAYED RELEASE ORAL
Refills: 0 | Status: DISCONTINUED | OUTPATIENT
Start: 2021-12-26 | End: 2022-01-07

## 2021-12-26 RX ORDER — DIVALPROEX SODIUM 500 MG/1
1 TABLET, DELAYED RELEASE ORAL
Qty: 0 | Refills: 0 | DISCHARGE

## 2021-12-26 RX ORDER — CARBAMAZEPINE 200 MG
400 TABLET ORAL
Refills: 0 | Status: DISCONTINUED | OUTPATIENT
Start: 2021-12-26 | End: 2022-01-07

## 2021-12-26 RX ORDER — LEVOTHYROXINE SODIUM 125 MCG
88 TABLET ORAL DAILY
Refills: 0 | Status: DISCONTINUED | OUTPATIENT
Start: 2021-12-26 | End: 2022-01-07

## 2021-12-26 RX ORDER — SODIUM CHLORIDE 9 MG/ML
1000 INJECTION INTRAMUSCULAR; INTRAVENOUS; SUBCUTANEOUS
Refills: 0 | Status: DISCONTINUED | OUTPATIENT
Start: 2021-12-26 | End: 2021-12-28

## 2021-12-26 RX ORDER — PIPERACILLIN AND TAZOBACTAM 4; .5 G/20ML; G/20ML
3.38 INJECTION, POWDER, LYOPHILIZED, FOR SOLUTION INTRAVENOUS ONCE
Refills: 0 | Status: COMPLETED | OUTPATIENT
Start: 2021-12-26 | End: 2021-12-26

## 2021-12-26 RX ORDER — ARIPIPRAZOLE 15 MG/1
5 TABLET ORAL DAILY
Refills: 0 | Status: DISCONTINUED | OUTPATIENT
Start: 2021-12-26 | End: 2021-12-26

## 2021-12-26 RX ORDER — PIPERACILLIN AND TAZOBACTAM 4; .5 G/20ML; G/20ML
3.38 INJECTION, POWDER, LYOPHILIZED, FOR SOLUTION INTRAVENOUS EVERY 12 HOURS
Refills: 0 | Status: DISCONTINUED | OUTPATIENT
Start: 2021-12-27 | End: 2021-12-29

## 2021-12-26 RX ORDER — DEXAMETHASONE 0.5 MG/5ML
6 ELIXIR ORAL DAILY
Refills: 0 | Status: COMPLETED | OUTPATIENT
Start: 2021-12-27 | End: 2022-01-05

## 2021-12-26 RX ORDER — ARIPIPRAZOLE 15 MG/1
25 TABLET ORAL DAILY
Refills: 0 | Status: DISCONTINUED | OUTPATIENT
Start: 2021-12-26 | End: 2021-12-26

## 2021-12-26 RX ORDER — ACETAMINOPHEN 500 MG
650 TABLET ORAL ONCE
Refills: 0 | Status: COMPLETED | OUTPATIENT
Start: 2021-12-26 | End: 2021-12-26

## 2021-12-26 RX ORDER — SODIUM CHLORIDE 9 MG/ML
1000 INJECTION INTRAMUSCULAR; INTRAVENOUS; SUBCUTANEOUS ONCE
Refills: 0 | Status: COMPLETED | OUTPATIENT
Start: 2021-12-26 | End: 2021-12-26

## 2021-12-26 RX ORDER — ARIPIPRAZOLE 15 MG/1
25 TABLET ORAL DAILY
Refills: 0 | Status: DISCONTINUED | OUTPATIENT
Start: 2021-12-26 | End: 2022-01-07

## 2021-12-26 RX ORDER — DIVALPROEX SODIUM 500 MG/1
1000 TABLET, DELAYED RELEASE ORAL
Refills: 0 | Status: DISCONTINUED | OUTPATIENT
Start: 2021-12-26 | End: 2021-12-27

## 2021-12-26 RX ORDER — DEXAMETHASONE 0.5 MG/5ML
6 ELIXIR ORAL ONCE
Refills: 0 | Status: COMPLETED | OUTPATIENT
Start: 2021-12-26 | End: 2021-12-26

## 2021-12-26 RX ORDER — HEPARIN SODIUM 5000 [USP'U]/ML
5000 INJECTION INTRAVENOUS; SUBCUTANEOUS EVERY 12 HOURS
Refills: 0 | Status: DISCONTINUED | OUTPATIENT
Start: 2021-12-26 | End: 2021-12-28

## 2021-12-26 RX ORDER — CHOLECALCIFEROL (VITAMIN D3) 125 MCG
2000 CAPSULE ORAL
Refills: 0 | Status: DISCONTINUED | OUTPATIENT
Start: 2021-12-26 | End: 2022-01-03

## 2021-12-26 RX ADMIN — Medication 6 MILLIGRAM(S): at 20:00

## 2021-12-26 RX ADMIN — PIPERACILLIN AND TAZOBACTAM 200 GRAM(S): 4; .5 INJECTION, POWDER, LYOPHILIZED, FOR SOLUTION INTRAVENOUS at 23:18

## 2021-12-26 RX ADMIN — SODIUM CHLORIDE 40 MILLILITER(S): 9 INJECTION INTRAMUSCULAR; INTRAVENOUS; SUBCUTANEOUS at 23:00

## 2021-12-26 RX ADMIN — Medication 650 MILLIGRAM(S): at 20:30

## 2021-12-26 RX ADMIN — SODIUM CHLORIDE 1000 MILLILITER(S): 9 INJECTION INTRAMUSCULAR; INTRAVENOUS; SUBCUTANEOUS at 21:00

## 2021-12-26 RX ADMIN — SODIUM CHLORIDE 1000 MILLILITER(S): 9 INJECTION INTRAMUSCULAR; INTRAVENOUS; SUBCUTANEOUS at 20:00

## 2021-12-26 RX ADMIN — Medication 650 MILLIGRAM(S): at 20:00

## 2021-12-26 NOTE — ED ADULT NURSE NOTE - OBJECTIVE STATEMENT
Pt BIBA from home for shortness of breath - pt is COVID + lives in group home but was visiting with mother as per pt- pt dx with COVID last week however cont to be SOB.  O2 89% on RA- Pt is unable to provide accurate hx-

## 2021-12-26 NOTE — ED PROVIDER NOTE - OBJECTIVE STATEMENT
pt dx with covid 1 week ago bib ems for fever, cough, weakness, body aches. pt denies cp, abd pain, vomiting, diarrhea. pt poor historian  pmd - ava

## 2021-12-26 NOTE — ED PROVIDER NOTE - CLINICAL SUMMARY MEDICAL DECISION MAKING FREE TEXT BOX
pt with covid bib ems for fever, cough, fatigue, body aches, low o2 sat in er - ekg/cxr/labs/ivf/tylenol/decadron

## 2021-12-27 DIAGNOSIS — U07.1 COVID-19: ICD-10-CM

## 2021-12-27 LAB
ALBUMIN SERPL ELPH-MCNC: 2.9 G/DL — LOW (ref 3.3–5)
ALP SERPL-CCNC: 52 U/L — SIGNIFICANT CHANGE UP (ref 40–120)
ALT FLD-CCNC: 43 U/L — SIGNIFICANT CHANGE UP (ref 12–78)
ANION GAP SERPL CALC-SCNC: 9 MMOL/L — SIGNIFICANT CHANGE UP (ref 5–17)
APPEARANCE UR: CLEAR — SIGNIFICANT CHANGE UP
AST SERPL-CCNC: 154 U/L — HIGH (ref 15–37)
BASOPHILS # BLD AUTO: 0 K/UL — SIGNIFICANT CHANGE UP (ref 0–0.2)
BASOPHILS NFR BLD AUTO: 0 % — SIGNIFICANT CHANGE UP (ref 0–2)
BILIRUB DIRECT SERPL-MCNC: 0.1 MG/DL — SIGNIFICANT CHANGE UP (ref 0–0.3)
BILIRUB INDIRECT FLD-MCNC: 0.4 MG/DL — SIGNIFICANT CHANGE UP (ref 0.2–1)
BILIRUB SERPL-MCNC: 0.5 MG/DL — SIGNIFICANT CHANGE UP (ref 0.2–1.2)
BILIRUB UR-MCNC: NEGATIVE — SIGNIFICANT CHANGE UP
BUN SERPL-MCNC: 23 MG/DL — SIGNIFICANT CHANGE UP (ref 7–23)
CALCIUM SERPL-MCNC: 8.5 MG/DL — SIGNIFICANT CHANGE UP (ref 8.5–10.1)
CHLORIDE SERPL-SCNC: 108 MMOL/L — SIGNIFICANT CHANGE UP (ref 96–108)
CO2 SERPL-SCNC: 21 MMOL/L — LOW (ref 22–31)
COLOR SPEC: YELLOW — SIGNIFICANT CHANGE UP
CREAT SERPL-MCNC: 1.1 MG/DL — SIGNIFICANT CHANGE UP (ref 0.5–1.3)
CRP SERPL-MCNC: 199 MG/L — HIGH
D DIMER BLD IA.RAPID-MCNC: 217 NG/ML DDU — SIGNIFICANT CHANGE UP
DIFF PNL FLD: NEGATIVE — SIGNIFICANT CHANGE UP
EOSINOPHIL # BLD AUTO: 0 K/UL — SIGNIFICANT CHANGE UP (ref 0–0.5)
EOSINOPHIL NFR BLD AUTO: 0 % — SIGNIFICANT CHANGE UP (ref 0–6)
FERRITIN SERPL-MCNC: 1024 NG/ML — HIGH (ref 30–400)
GLUCOSE SERPL-MCNC: 157 MG/DL — HIGH (ref 70–99)
GLUCOSE UR QL: NEGATIVE — SIGNIFICANT CHANGE UP
HCT VFR BLD CALC: 44.9 % — SIGNIFICANT CHANGE UP (ref 39–50)
HGB BLD-MCNC: 15.2 G/DL — SIGNIFICANT CHANGE UP (ref 13–17)
IMM GRANULOCYTES NFR BLD AUTO: 0.5 % — SIGNIFICANT CHANGE UP (ref 0–1.5)
INR BLD: 1.15 RATIO — SIGNIFICANT CHANGE UP (ref 0.88–1.16)
KETONES UR-MCNC: NEGATIVE — SIGNIFICANT CHANGE UP
LACTATE SERPL-SCNC: 2 MMOL/L — SIGNIFICANT CHANGE UP (ref 0.7–2)
LEUKOCYTE ESTERASE UR-ACNC: NEGATIVE — SIGNIFICANT CHANGE UP
LYMPHOCYTES # BLD AUTO: 0.51 K/UL — LOW (ref 1–3.3)
LYMPHOCYTES # BLD AUTO: 6.9 % — LOW (ref 13–44)
MCHC RBC-ENTMCNC: 30.5 PG — SIGNIFICANT CHANGE UP (ref 27–34)
MCHC RBC-ENTMCNC: 33.9 GM/DL — SIGNIFICANT CHANGE UP (ref 32–36)
MCV RBC AUTO: 90 FL — SIGNIFICANT CHANGE UP (ref 80–100)
MONOCYTES # BLD AUTO: 0.44 K/UL — SIGNIFICANT CHANGE UP (ref 0–0.9)
MONOCYTES NFR BLD AUTO: 6 % — SIGNIFICANT CHANGE UP (ref 2–14)
NEUTROPHILS # BLD AUTO: 6.37 K/UL — SIGNIFICANT CHANGE UP (ref 1.8–7.4)
NEUTROPHILS NFR BLD AUTO: 86.6 % — HIGH (ref 43–77)
NITRITE UR-MCNC: NEGATIVE — SIGNIFICANT CHANGE UP
NRBC # BLD: 0 /100 WBCS — SIGNIFICANT CHANGE UP (ref 0–0)
PH UR: 5 — SIGNIFICANT CHANGE UP (ref 5–8)
PHOSPHATE SERPL-MCNC: 2.1 MG/DL — LOW (ref 2.5–4.5)
PLATELET # BLD AUTO: 170 K/UL — SIGNIFICANT CHANGE UP (ref 150–400)
POTASSIUM SERPL-MCNC: 4.2 MMOL/L — SIGNIFICANT CHANGE UP (ref 3.5–5.3)
POTASSIUM SERPL-SCNC: 4.2 MMOL/L — SIGNIFICANT CHANGE UP (ref 3.5–5.3)
PROCALCITONIN SERPL-MCNC: 0.28 NG/ML — HIGH (ref 0–0.04)
PROT SERPL-MCNC: 8.4 G/DL — HIGH (ref 6–8.3)
PROT UR-MCNC: 15
PROTHROM AB SERPL-ACNC: 13.4 SEC — SIGNIFICANT CHANGE UP (ref 10.6–13.6)
RBC # BLD: 4.99 M/UL — SIGNIFICANT CHANGE UP (ref 4.2–5.8)
RBC # FLD: 13.3 % — SIGNIFICANT CHANGE UP (ref 10.3–14.5)
SODIUM SERPL-SCNC: 138 MMOL/L — SIGNIFICANT CHANGE UP (ref 135–145)
SP GR SPEC: 1.01 — SIGNIFICANT CHANGE UP (ref 1.01–1.02)
TROPONIN I, HIGH SENSITIVITY RESULT: 16.7 NG/L — SIGNIFICANT CHANGE UP
UROBILINOGEN FLD QL: NEGATIVE — SIGNIFICANT CHANGE UP
WBC # BLD: 7.36 K/UL — SIGNIFICANT CHANGE UP (ref 3.8–10.5)
WBC # FLD AUTO: 7.36 K/UL — SIGNIFICANT CHANGE UP (ref 3.8–10.5)

## 2021-12-27 PROCEDURE — 93970 EXTREMITY STUDY: CPT | Mod: 26

## 2021-12-27 PROCEDURE — 71250 CT THORAX DX C-: CPT | Mod: 26

## 2021-12-27 RX ORDER — VALPROIC ACID (AS SODIUM SALT) 250 MG/5ML
1000 SOLUTION, ORAL ORAL
Refills: 0 | Status: DISCONTINUED | OUTPATIENT
Start: 2021-12-27 | End: 2022-01-07

## 2021-12-27 RX ORDER — REMDESIVIR 5 MG/ML
200 INJECTION INTRAVENOUS EVERY 24 HOURS
Refills: 0 | Status: COMPLETED | OUTPATIENT
Start: 2021-12-27 | End: 2021-12-27

## 2021-12-27 RX ORDER — DIVALPROEX SODIUM 500 MG/1
750 TABLET, DELAYED RELEASE ORAL DAILY
Refills: 0 | Status: DISCONTINUED | OUTPATIENT
Start: 2021-12-27 | End: 2022-01-07

## 2021-12-27 RX ORDER — LEVOTHYROXINE SODIUM 125 MCG
100 TABLET ORAL DAILY
Refills: 0 | Status: DISCONTINUED | OUTPATIENT
Start: 2021-12-27 | End: 2022-01-07

## 2021-12-27 RX ORDER — REMDESIVIR 5 MG/ML
INJECTION INTRAVENOUS
Refills: 0 | Status: COMPLETED | OUTPATIENT
Start: 2021-12-27 | End: 2021-12-31

## 2021-12-27 RX ORDER — DIVALPROEX SODIUM 500 MG/1
500 TABLET, DELAYED RELEASE ORAL ONCE
Refills: 0 | Status: COMPLETED | OUTPATIENT
Start: 2021-12-27 | End: 2021-12-27

## 2021-12-27 RX ORDER — ACETAMINOPHEN 500 MG
650 TABLET ORAL EVERY 6 HOURS
Refills: 0 | Status: DISCONTINUED | OUTPATIENT
Start: 2021-12-27 | End: 2022-01-07

## 2021-12-27 RX ORDER — REMDESIVIR 5 MG/ML
100 INJECTION INTRAVENOUS EVERY 24 HOURS
Refills: 0 | Status: COMPLETED | OUTPATIENT
Start: 2021-12-28 | End: 2021-12-31

## 2021-12-27 RX ADMIN — Medication 88 MICROGRAM(S): at 06:22

## 2021-12-27 RX ADMIN — REMDESIVIR 500 MILLIGRAM(S): 5 INJECTION INTRAVENOUS at 16:58

## 2021-12-27 RX ADMIN — PIPERACILLIN AND TAZOBACTAM 25 GRAM(S): 4; .5 INJECTION, POWDER, LYOPHILIZED, FOR SOLUTION INTRAVENOUS at 22:38

## 2021-12-27 RX ADMIN — Medication 650 MILLIGRAM(S): at 21:11

## 2021-12-27 RX ADMIN — ARIPIPRAZOLE 25 MILLIGRAM(S): 15 TABLET ORAL at 12:03

## 2021-12-27 RX ADMIN — PIPERACILLIN AND TAZOBACTAM 25 GRAM(S): 4; .5 INJECTION, POWDER, LYOPHILIZED, FOR SOLUTION INTRAVENOUS at 11:59

## 2021-12-27 RX ADMIN — Medication 2000 UNIT(S): at 17:03

## 2021-12-27 RX ADMIN — DIVALPROEX SODIUM 750 MILLIGRAM(S): 500 TABLET, DELAYED RELEASE ORAL at 11:59

## 2021-12-27 RX ADMIN — DIVALPROEX SODIUM 500 MILLIGRAM(S): 500 TABLET, DELAYED RELEASE ORAL at 06:59

## 2021-12-27 RX ADMIN — PANTOPRAZOLE SODIUM 40 MILLIGRAM(S): 20 TABLET, DELAYED RELEASE ORAL at 06:22

## 2021-12-27 RX ADMIN — Medication 400 MILLIGRAM(S): at 17:03

## 2021-12-27 RX ADMIN — Medication 2000 UNIT(S): at 06:21

## 2021-12-27 RX ADMIN — Medication 1000 MILLIGRAM(S): at 17:03

## 2021-12-27 RX ADMIN — Medication 400 MILLIGRAM(S): at 06:21

## 2021-12-27 RX ADMIN — Medication 6 MILLIGRAM(S): at 06:21

## 2021-12-27 RX ADMIN — HEPARIN SODIUM 5000 UNIT(S): 5000 INJECTION INTRAVENOUS; SUBCUTANEOUS at 17:03

## 2021-12-27 RX ADMIN — HEPARIN SODIUM 5000 UNIT(S): 5000 INJECTION INTRAVENOUS; SUBCUTANEOUS at 06:21

## 2021-12-27 NOTE — PROGRESS NOTE ADULT - PROBLEM SELECTOR PLAN 1
covid + over 1 week  vaccinated ,not boostered  fevers sob  continue dexamethasone per id  iv abxs due to concamitant bacterial aspiration most likely covid + over 1 week  vaccinated ,not boostered  fevers sob  continue dexamethasone and remdesivir  iv abxs due to concamitant bacterial aspiration most likely

## 2021-12-27 NOTE — PROGRESS NOTE ADULT - PROBLEM SELECTOR PLAN 2
continue iv abxs  elevated ferritin/pct ration,,,  off oxygen 88 percent  patient cannot go back to group home if stll positive and cannot go home to mom bec mom is sick continue iv abxs  per infectious disease,,high risk aspiration  covid + well over a week  was at his moms because group home would not keep him with +  off oxygen 88 percent  patient cannot go back to group home if stll positive and cannot go home to mom bec mom is sick

## 2021-12-27 NOTE — CHART NOTE - NSCHARTNOTEFT_GEN_A_CORE
Called by RN for pt with temperature of 102.9F oral. Pt otherwise with stable vital signs and no complaints. Pt admitted for hypoxia due to COVID pneumonia with likely superimposed bacterial PNA. ID following. Pt on zosyn. STAT tylenol ordered. C/w supportive care. STAT Blood Cultures, UA, and UCx ordered. RN to call with changes.      Vital Signs Last 24 Hrs  T(C): 39.4 (27 Dec 2021 20:50), Max: 39.4 (27 Dec 2021 20:50)  T(F): 102.9 (27 Dec 2021 20:50), Max: 102.9 (27 Dec 2021 20:50)  HR: 82 (27 Dec 2021 20:50) (67 - 96)  BP: 116/70 (27 Dec 2021 20:50) (116/70 - 148/87)  BP(mean): --  RR: 18 (27 Dec 2021 20:50) (18 - 18)  SpO2: 88% (27 Dec 2021 20:50) (88% - 96%) Called by RN for pt with temperature of 102.9F oral. Pt otherwise with stable vital signs and no complaints. Pt admitted for hypoxia due to COVID pneumonia with likely superimposed bacterial PNA. Now febrile likely 2/2 covid19 PNA with superimposed bacteria. ID following. Pt on zosyn. STAT tylenol ordered. C/w supportive care. STAT Blood Cultures, UA, and UCx ordered. RN to call with changes.      Vital Signs Last 24 Hrs  T(C): 39.4 (27 Dec 2021 20:50), Max: 39.4 (27 Dec 2021 20:50)  T(F): 102.9 (27 Dec 2021 20:50), Max: 102.9 (27 Dec 2021 20:50)  HR: 82 (27 Dec 2021 20:50) (67 - 96)  BP: 116/70 (27 Dec 2021 20:50) (116/70 - 148/87)  BP(mean): --  RR: 18 (27 Dec 2021 20:50) (18 - 18)  SpO2: 88% (27 Dec 2021 20:50) (88% - 96%)

## 2021-12-27 NOTE — PROGRESS NOTE ADULT - SUBJECTIVE AND OBJECTIVE BOX
CHAD DICKEY    PLV 2NOR 242 W1    Allergies    No Known Allergies    Intolerances        PAST MEDICAL & SURGICAL HISTORY:  Hypothyroid        FAMILY HISTORY:      Home Medications:  ARIPiprazole 10 mg oral tablet: 0.5 tab(s) orally once a day (in the morning)      *Takes along with 20mg for a TDD: 25mg * (2021 10:12)  ARIPiprazole 20 mg oral tablet: 1 tab(s) orally once a day (in the morning)    *Takes along with 5mg for a TDD: 25mg* (2021 10:12)  Calcium 600+D oral tablet: 1 tab(s) orally 2 times a day (2021 10:12)  carBAMazepine 400 mg oral tablet, extended release: 1 tab(s) orally 2 times a day (2021 10:12)  Cetaphil topical lotion: Apply topically to affected area 2 times a day (2021 10:12)  divalproex sodium 250 mg oral delayed release tablet: 1 tab(s) orally once a day (in the morning)    *Takes along with 500mg for a TDD in the morninmg  (2021 10:12)  divalproex sodium 500 mg oral delayed release tablet: 2 tab(s) orally 2 times a day at 3pm and 8pm  (2021 10:12)  Fish Oil 1000 mg oral capsule: 1 cap(s) orally once a day (in the morning) go back to usual home dose (15 Raymond 2021 19:47)  levothyroxine 100 mcg (0.1 mg) oral tablet: 1 tab(s) orally once a day     *Takes with 88mcg (1hr apart) for a TDD; 188mcg* follow home protocol (15 Raymond 2021 19:47)  levothyroxine 88 mcg (0.088 mg) oral tablet: 1 tab(s) orally once a day    *Takes with 100mcg (1hr apart) for a TDD; 188mcg * follow home protocol (15 Raymond 2021 19:47)  OLANZapine 20 mg oral tablet: 0.5 tab(s) orally once a day (in the evening) (2021 10:12)  oxybutynin 10 mg/24 hr oral tablet, extended release: 1 tab(s) orally once a day (2021 10:12)  tamsulosin 0.4 mg oral capsule: 1 cap(s) orally once a day (at bedtime) (2021 10:12)  Vitamin D3 1000 intl units (25 mcg) oral tablet:  (2021 10:12)      MEDICATIONS  (STANDING):  ARIPiprazole 25 milliGRAM(s) Oral daily  carBAMazepine ER Tablet 400 milliGRAM(s) Oral two times a day  cholecalciferol 2000 Unit(s) Oral two times a day  dexAMETHasone  Injectable 6 milliGRAM(s) IV Push daily  diVALproex  milliGRAM(s) Oral daily  heparin   Injectable 5000 Unit(s) SubCutaneous every 12 hours  levothyroxine 88 MICROGram(s) Oral daily  levothyroxine 100 MICROGram(s) Oral daily  pantoprazole    Tablet 40 milliGRAM(s) Oral before breakfast  piperacillin/tazobactam IVPB.. 3.375 Gram(s) IV Intermittent every 12 hours  sodium chloride 0.9%. 1000 milliLiter(s) (40 mL/Hr) IV Continuous <Continuous>  valproic acid 1000 milliGRAM(s) Oral two times a day    MEDICATIONS  (PRN):      Diet, Pureed:   Moderately Thick Liquids (MODTHICKLIQS) (21 @ 22:42) [Active]          Vital Signs Last 24 Hrs  T(C): 36.9 (27 Dec 2021 04:30), Max: 37.9 (26 Dec 2021 18:08)  T(F): 98.4 (27 Dec 2021 04:30), Max: 100.2 (26 Dec 2021 18:08)  HR: 67 (27 Dec 2021 04:30) (67 - 100)  BP: 127/73 (27 Dec 2021 04:30) (127/73 - 148/87)  BP(mean): --  RR: 18 (27 Dec 2021 04:30) (18 - 18)  SpO2: 92% (27 Dec 2021 04:30) (91% - 96%)      21 @ 07:01  -  21 @ 07:00  --------------------------------------------------------  IN: 160 mL / OUT: 0 mL / NET: 160 mL              LABS:                        15.2   7.36  )-----------( 170      ( 27 Dec 2021 09:36 )             44.9         138  |  108  |  23  ----------------------------<  157<H>  4.2   |  21<L>  |  1.10    Ca    8.5      27 Dec 2021 09:36  Phos  2.1         TPro  8.4<H>  /  Alb  2.9<L>  /  TBili  0.5  /  DBili  0.1  /  AST  154<H>  /  ALT  43  /  AlkPhos  52      PT/INR - ( 27 Dec 2021 09:36 )   PT: 13.4 sec;   INR: 1.15 ratio                   WBC:  WBC Count: 7.36 K/uL ( @ 09:36)  WBC Count: 6.51 K/uL ( @ 20:50)      MICROBIOLOGY:  RECENT CULTURES:              PT/INR - ( 27 Dec 2021 09:36 )   PT: 13.4 sec;   INR: 1.15 ratio             Sodium:  Sodium, Serum: 138 mmol/L ( @ 09:36)  Sodium, Serum: 137 mmol/L ( @ 20:50)      1.10 mg/dL  @ 09:36  1.40 mg/dL  @ 20:50      Hemoglobin:  Hemoglobin: 15.2 g/dL ( @ 09:36)  Hemoglobin: 14.8 g/dL ( @ 20:50)      Platelets: Platelet Count - Automated: 170 K/uL ( @ 09:36)  Platelet Count - Automated: 172 K/uL ( @ 20:50)      LIVER FUNCTIONS - ( 27 Dec 2021 09:36 )  Alb: 2.9 g/dL / Pro: 8.4 g/dL / ALK PHOS: 52 U/L / ALT: 43 U/L / AST: 154 U/L / GGT: x                 RADIOLOGY & ADDITIONAL STUDIES:      MICROBIOLOGY:  RECENT CULTURES:

## 2021-12-27 NOTE — PROGRESS NOTE ADULT - ASSESSMENT
NOBLE BONILLA 60 M [facility] P 12/26/2021 1958 DR GALEN ROSA     REVIEW OF SYMPTOMS      Able to give ROS  Yes     RELIABLE +/-   CONSTITUTIONAL Weakness Yes  Chills No   ENDOCRINE  No heat or cold intolerance    ALLERGY No hives  Sore throat No stridor  RESP Coughing blood no  Shortness of breath YES   NEURO No Headache  Confusion Pain neck No   CARDIAC No Chest pain No Palpitations   GI  Pain abdomen NO   Vomiting NO     PHYSICAL EXAM    HEENT Unremarkable  atraumatic   RESP Fair air entry EXP prolonged    Harsh breath sound Resp distres mild   CARDIAC S1 S2 No S3     NO JVD    ABDOMEN SOFT BS PRESENT NOT DISTENDED No hepatosplenomegaly PEDAL EDEMA present No calf tenderness  NO rash       PATIENT PRESENTATION.                                       60 male pmhx of bipolar disorder, mild intellectual developmental disability, anxiety disorder, hypothyroidism, HLD, Vit D deficiency, GERD, osteopenia, BPH, IBS, lumbago, and eczema   was dagnosed with covid 1 week ago and admitted 12/26/2021 with fever cough weakness    Pulm consulted 12/26/2021                          _____                            HOSPITAL COURSE.  COVID poa 12/26/2021   DEXA 12/26  RDVZ 12/27/2021  BACT INFECTN   12/26/2021 zosyn  HYPOTHYROID pmh  NEURO PROBLEMS pmh  12/26/2021 aripiprazole 25  12/26/2021 carbamazepine 400.2   12/26/2021 depakot 1000.2   _____                            GOC. 12/26/2021 Full code   COVID STATUS. 12/26/2021 scv2 (+)   ICU STAY.none    PMH.  PMH GERD   PMH BPH   PMH BIPOLAR  PMH MILD INTELLECTUAL DISABILITY       BEST PRACTICE ISSUES.                                                  HEAD OF BED ELEVATION. Yes  DVT PROPHYLAXIS.     12/27/2021 hpsc   12/26/2021 lvnx 40   dced                                  STEIN PROPHYLAXIS.  12/26/2021 protonix 40                                                                                       DIET.     12/26/2021 puree                      INFECTION PROPHYLAXIS.    SPEECH SWALLOW RECOMMENDATIONS.   IV FLUIDS. 12/26/2021 ns 49      GENERAL ISSUES                                       ALLERGY.   nka                         WEIGHT.     12/26/2021 94                               BMI.                12/26/2021 32   ABIO. 12/26/2021 zosyn Dr Menendez                         PATIENT DATA   VITALS/PO/IO/VENT/DRIPS.   12/27/2021 102 60   110/60   12/27/2021 ra 88     ASSESSMENT/RECOMMENDATIONS.    RESP.   Monitor po Target po 90-95%    HEMODYNAMICS.   Monitor bp Target MAP 65 (+)    OXYGENATION STATUS.   12/27/2021 ra 88%     RO VTE.  V duplx 12/27/2021 v duplx (-)    INFECTION.   Surveillance for bacterial infection.  W 12/26/2021 w 6.5  pr 12/27/2021 pr .28   Fluab 12/26/2021 (-)  rsv 12/26/2021 (-)   Zosyn 12/26/2021 Zosyn Dr Menendez   ID 12/27/2021 Seen by Dr Jarek Zhang is that if Ferritin/pct level over 1000 to dc zosyn     COVID.  Scv2 12/26/2021 (+)  Ferrn 12/27/2021 ferrn 1024   Cr 12/26-12/27/2021 Cr 1.4 - 1.1  LFTS 12/26/2021  AP. 149  AST. 39  ALT. 53  ct 12/27/2021 ct ch periph bl opac   remdesevir 12/27/2021 remdesev  Dexa 12/26/2021 dexa 6 x 10d  a/r Monitor inflammatory markers  Monitor thrombotic markers   Monitor oxygenation  12/27/2021 ID started remdes    TIME SPENT   Over 25 minutes aggregate care time spent on encounter; activities included   direct patient care, counseling and/or coordinating care reviewing notes, lab data/ imaging , discussion with multidisciplinary team/ patient  /family and explaining in detail risks, benefits, alternatives  of the recommendations     NOBLE BONILLA 60 M [facility] P 12/26/2021 1960 DR GALEN ROSA

## 2021-12-27 NOTE — CHART NOTE - NSCHARTNOTEFT_GEN_A_CORE
Called by RN stating pt refusing 1000mg of depakote this morning as he takes 500mg at home in the AM. RN states she called Dr. Benedict Hooker who stated she would adjust the pt's medications today. One time order for 500mg depakote placed for this morning pending medication adjustment by Dr. Benedict Hooker.

## 2021-12-27 NOTE — PROVIDER CONTACT NOTE (OTHER) - RECOMMENDATIONS
Inform provider - there is discrepancy between ordered meds and patient's home medication. Pt states he should take depakote 500mg and synthroid 188mcg

## 2021-12-27 NOTE — PATIENT PROFILE ADULT - FALL HARM RISK - HARM RISK INTERVENTIONS
Assistance with ambulation/Assistance OOB with selected safe patient handling equipment/Communicate Risk of Fall with Harm to all staff/Discuss with provider need for PT consult/Monitor gait and stability/Reinforce activity limits and safety measures with patient and family/Tailored Fall Risk Interventions/Visual Cue: Yellow wristband and red socks/Bed in lowest position, wheels locked, appropriate side rails in place/Call bell, personal items and telephone in reach/Instruct patient to call for assistance before getting out of bed or chair/Non-slip footwear when patient is out of bed/[employer] to call system/Physically safe environment - no spills, clutter or unnecessary equipment/Purposeful Proactive Rounding/Room/bathroom lighting operational, light cord in reach

## 2021-12-27 NOTE — PROGRESS NOTE ADULT - SUBJECTIVE AND OBJECTIVE BOX
PAST MEDICAL & SURGICAL HISTORY:  Hypothyroid        MEDICATIONS  (STANDING):  ARIPiprazole 25 milliGRAM(s) Oral daily  carBAMazepine ER Tablet 400 milliGRAM(s) Oral two times a day  cholecalciferol 2000 Unit(s) Oral two times a day  dexAMETHasone  Injectable 6 milliGRAM(s) IV Push daily  diVALproex  milliGRAM(s) Oral daily  heparin   Injectable 5000 Unit(s) SubCutaneous every 12 hours  levothyroxine 88 MICROGram(s) Oral daily  levothyroxine 100 MICROGram(s) Oral daily  pantoprazole    Tablet 40 milliGRAM(s) Oral before breakfast  piperacillin/tazobactam IVPB.. 3.375 Gram(s) IV Intermittent every 12 hours  remdesivir  IVPB   IV Intermittent   sodium chloride 0.9%. 1000 milliLiter(s) (40 mL/Hr) IV Continuous <Continuous>  valproic acid 1000 milliGRAM(s) Oral two times a day    MEDICATIONS  (PRN):      Patient is a 61y old  Male who presents with a chief complaint of low oxygen with covid 1 week now (27 Dec 2021 11:55)      Vital Signs Last 24 Hrs  T(C): 36.7 (27 Dec 2021 12:19), Max: 36.9 (27 Dec 2021 04:30)  T(F): 98.1 (27 Dec 2021 12:19), Max: 98.4 (27 Dec 2021 04:30)  HR: 81 (27 Dec 2021 12:19) (67 - 96)  BP: 117/69 (27 Dec 2021 12:19) (117/69 - 148/87)  BP(mean): --  RR: 18 (27 Dec 2021 12:19) (18 - 18)  SpO2: 88% (27 Dec 2021 12:19) (88% - 96%)          12-26 @ 07:01  -  12-27 @ 07:00  --------------------------------------------------------  IN: 160 mL / OUT: 0 mL / NET: 160 mL        REVIEW OF SYSTEMS:    Constitutional: No fever, weight loss or fatigue  Eyes: No eye pain, visual disturbances, or discharge  ENT:  No difficulty hearing, tinnitus, vertigo; No sinus or throat pain  Neck: No pain or stiffness  Breasts: No pain, masses or nipple discharge  Respiratory: No cough, wheezing, chills or hemoptysis  Cardiovascular: No chest pain, palpitations, shortness of breath, dizziness or leg swelling  Gastrointestinal: No abdominal or epigastric pain. No nausea, vomiting or hematemesis; No diarrhea or constipation. No melena or hematochezia.  Genitourinary: No dysuria, frequency, hematuria or incontinence  Rectal: No pain, hemorrhoids or incontinence  Neurological: No headaches, memory loss, loss of strength, numbness or tremors  Skin: No itching, burning, rashes or lesions   Lymph Nodes: No enlarged glands  Endocrine: No heat or cold intolerance; No hair loss  Musculoskeletal: No joint pain or swelling; No muscle, back or extremity pain  Psychiatric: No depression, anxiety, mood swings or difficulty sleeping  Heme/Lymph: No easy bruising or bleeding gums  Allergy and Immunologic: No hives or eczema    PHYSICAL EXAM:    Constitutional: NAD, well-groomed, well-developed  HEENT: PERRLA, EOMI, Normal Hearing, MMM  Neck: No LAD, No JVD  Back: Normal spine flexure, No CVA tenderness  Respiratory: CTAB/L   Cardiovascular: S1 and S2, RRR, no M/G/R  Gastrointestinal: BS+, soft, NT/ND  Extremities: No peripheral edema  Vascular: 2+ peripheral pulses  Neurological: A/O x 3, no focal deficits  Skin: No rashes      decubiti:                           15.2   7.36  )-----------( 170      ( 27 Dec 2021 09:36 )             44.9     12-27    138  |  108  |  23  ----------------------------<  157<H>  4.2   |  21<L>  |  1.10    Ca    8.5      27 Dec 2021 09:36  Phos  2.1     12-27    TPro  8.4<H>  /  Alb  2.9<L>  /  TBili  0.5  /  DBili  0.1  /  AST  154<H>  /  ALT  43  /  AlkPhos  52  12-27    PT/INR - ( 27 Dec 2021 09:36 )   PT: 13.4 sec;   INR: 1.15 ratio       ferritin /pct over 3500 ration    < from: CT Chest No Cont (12.27.21 @ 10:50) >  IV Contrast: NONE  Oral Contrast: NONE  Complications: None reported at time of study completion    PROCEDURE:  CT of the Chest was performed.  Sagittal and coronal reformats were performed.    FINDINGS:    LUNGS AND AIRWAYS: PLEURA:  There are patchy, predominantly peripheral groundglass opacities   bilateral upper and lower lung zones.  There are dependent bibasilar atelectatic changes.  The central airways remain patent.    No pleural effusion.    MEDIASTINUM AND CY:  Prevascular andanterior mediastinal lymph nodes measuring up to 1.5 cm   short axis.    VESSELS: Mild atherosclerotic changes thoracic aorta.    HEART: Heart size is normal. No pericardial effusion.    CHEST WALL AND LOWER NECK:  Small bilateral axillary lymph nodes.    VISUALIZED UPPER ABDOMEN: Within normal limits.    BONES:  Degenerative changes.    IMPRESSION:    Bilateral, predominantly peripheral groundglass airspace opacities,     < end of copied text >              Radiology:

## 2021-12-27 NOTE — CONSULT NOTE ADULT - ASSESSMENT
Patient is a 60 year old male with PMH of bipolar disorder, intellectual developmental disability, anxiety, hypothyroidism, HLD, BPH, eczema who was diagnosed with COVID 1 week ago and was brought in for fever, cough, weakness and body aches and admitted for hypoxia due to COVID pneumonia.   +COVID on admission, negative flu  CXR reviewed, b/l infiltrates  CT chest imaging reviewed, c/w COVID  Vaccinated with Pfizer x2  NLR on admission - 4.51/0.99= 4.6    Recommendations:  12/27 - afebrile, on NC 1.5L, NLR -6.37/0.51 = 12.5.   Started on dexamethasone x10d.   Will start on remdesivir x5d, monitor LFTs.   Started on pip-tazo for possible bacterial pna, will check for ferritin/pct, if ratio >1000 then would d/c pip-tazo.  On heparin for vte ppx.   Supplemental O2 as needed, wean as tolerated.   Prone as tolerated.   Monitor CBC with diff, inflammatory markers.          Lou Tilley M.D.  [facility], Division of Infectious Diseases  [phone]  After 5pm on weekdays and all day on weekends - please call [phone]
      NOBLE BONILLA 60 M [facility] P 12/26/2021 1958 DR GALEN ROSA     Initial evaluation/Pulmonary Critical Care consultation requested on  12/26/2021 by Dr    from Dr Hurtado   Patient examined chart reviewed    HOSPITAL ADMISSION   PATIENT CAME  FROM (if information available)      NOBLE BONILLA 60 M [facility] P 12/26/2021 1960 DR GALEN ORSA     REVIEW OF SYMPTOMS      Able to give (reliable) ROS  NO     PHYSICAL EXAM    HEENT Unremarkable  atraumatic   RESP Fair air entry EXP prolonged    Harsh breath sound Resp distres mild   CARDIAC S1 S2 No S3     NO JVD    ABDOMEN SOFT BS PRESENT NOT DISTENDED No hepatosplenomegaly   PEDAL EDEMA present No calf tenderness  NO rash       PATIENT PRESENTATION.                                       60 male pmhx of bipolar disorder, mild intellectual developmental disability, anxiety disorder, hypothyroidism, HLD, Vit D deficiency, GERD, osteopenia, BPH, IBS, lumbago, and eczema   was dagnosed with covid 1 week ago and admitted 12/26/2021 with fever cough weakness    Pulm consulted 12/26/2021                      _____                            HOSPITAL COURSE.  COVID poa 12/26/2021   BACT INFECTN   12/26/2021 zosyn  HYPOTHYROID pmh  NEURO PROBLEMS pmh  12/26/2021 aripiprazole 25  12/26/2021 carbamazepine 400.2   12/26/2021 depakot 1000.2   _____                            GOC. 12/26/2021 Full code   COVID STATUS. 12/26/2021 scv2 (+)   ICU STAY.none      PMH.  PMH GERD   PMH BPH   PMH BIPOLAR  PMH MILD INTELLECTUAL DISABILITY       BEST PRACTICE ISSUES.                                                  HEAD OF BED ELEVATION. Yes  DVT PROPHYLAXIS.      12/26/2021 lvnx 40                                    STEIN PROPHYLAXIS.  12/26/2021 protonix 40                                                                                       DIET.     12/26/2021 puree                      INFECTION PROPHYLAXIS.    SPEECH SWALLOW RECOMMENDATIONS.   IV FLUIDS. 12/26/2021 ns 49    GENERAL ISSUES                                       ALLERGY.   nka                         WEIGHT.     12/26/2021 94                               BMI.                12/26/2021 32   ABIO. 12/26/2021 caron Menendez     PATIENT DATA   VITALS/PO/IO/VENT/DRIPS.     12/26/2021 100 100 130/60   12/26/2021 ra 92%         ASSESSMENT/RECOMMENDATIONS.    RESP.   Monitor po Target po 90-95%    HEMODYNAMICS.   Monitor bp Target MAP 65 (+)    OXYGENATION STATUS.   12/26/2021 ra 92%       INFECTION.   Surveillance for bacterial infection.  W 12/26/2021 w 6.5  Fluab 12/26/2021 (-)  rsv 12/26/2021 (-)   Zosyn 12/26/2021 Zosyn Dr Menendez     COVID.  Scv2 12/26/2021 (+)  Cr 12/26/2021 Cr 1.4  LFTS 12/26/2021  AP. 149  AST. 39  ALT. 53  Dexa 12/26/2021 dexa 6 x 10d  a/r Monitor inflammatory markers  Monitor thrombotic markers   Monitor oxygenation  ID eval to decide need for remdesevir He may have already crossed the window       TIME SPENT   Over 55 minutes aggregate care time spent on encounter; activities included   direct patient care, counseling and/or coordinating care reviewing notes, lab data/ imaging , discussion with multidisciplinary team/ patient  /family and explaining in detail risks, benefits, alternatives  of the recommendations     NOBLE BONILLA 60 M [facility] P 12/26/2021 1960 DR GALEN ROSA

## 2021-12-28 LAB
ALBUMIN SERPL ELPH-MCNC: 2.4 G/DL — LOW (ref 3.3–5)
ALBUMIN SERPL ELPH-MCNC: 2.5 G/DL — LOW (ref 3.3–5)
ALP SERPL-CCNC: 50 U/L — SIGNIFICANT CHANGE UP (ref 40–120)
ALP SERPL-CCNC: 52 U/L — SIGNIFICANT CHANGE UP (ref 40–120)
ALT FLD-CCNC: 53 U/L — SIGNIFICANT CHANGE UP (ref 12–78)
ALT FLD-CCNC: 56 U/L — SIGNIFICANT CHANGE UP (ref 12–78)
ANION GAP SERPL CALC-SCNC: 10 MMOL/L — SIGNIFICANT CHANGE UP (ref 5–17)
AST SERPL-CCNC: 144 U/L — HIGH (ref 15–37)
AST SERPL-CCNC: 165 U/L — HIGH (ref 15–37)
BASOPHILS # BLD AUTO: 0 K/UL — SIGNIFICANT CHANGE UP (ref 0–0.2)
BASOPHILS NFR BLD AUTO: 0 % — SIGNIFICANT CHANGE UP (ref 0–2)
BILIRUB DIRECT SERPL-MCNC: 0.1 MG/DL — SIGNIFICANT CHANGE UP (ref 0–0.3)
BILIRUB INDIRECT FLD-MCNC: 0.4 MG/DL — SIGNIFICANT CHANGE UP (ref 0.2–1)
BILIRUB SERPL-MCNC: 0.4 MG/DL — SIGNIFICANT CHANGE UP (ref 0.2–1.2)
BILIRUB SERPL-MCNC: 0.5 MG/DL — SIGNIFICANT CHANGE UP (ref 0.2–1.2)
BUN SERPL-MCNC: 30 MG/DL — HIGH (ref 7–23)
CALCIUM SERPL-MCNC: 8.1 MG/DL — LOW (ref 8.5–10.1)
CHLORIDE SERPL-SCNC: 106 MMOL/L — SIGNIFICANT CHANGE UP (ref 96–108)
CK MB BLD-MCNC: 0.3 % — SIGNIFICANT CHANGE UP (ref 0–3.5)
CK MB CFR SERPL CALC: 3.4 NG/ML — SIGNIFICANT CHANGE UP (ref 0–3.6)
CK SERPL-CCNC: 1319 U/L — HIGH (ref 26–308)
CO2 SERPL-SCNC: 22 MMOL/L — SIGNIFICANT CHANGE UP (ref 22–31)
CREAT SERPL-MCNC: 1.2 MG/DL — SIGNIFICANT CHANGE UP (ref 0.5–1.3)
CREAT SERPL-MCNC: 1.3 MG/DL — SIGNIFICANT CHANGE UP (ref 0.5–1.3)
EOSINOPHIL # BLD AUTO: 0 K/UL — SIGNIFICANT CHANGE UP (ref 0–0.5)
EOSINOPHIL NFR BLD AUTO: 0 % — SIGNIFICANT CHANGE UP (ref 0–6)
FERRITIN SERPL-MCNC: 1173 NG/ML — HIGH (ref 30–400)
GLUCOSE SERPL-MCNC: 157 MG/DL — HIGH (ref 70–99)
HCT VFR BLD CALC: 44.3 % — SIGNIFICANT CHANGE UP (ref 39–50)
HGB BLD-MCNC: 15 G/DL — SIGNIFICANT CHANGE UP (ref 13–17)
IMM GRANULOCYTES NFR BLD AUTO: 0.4 % — SIGNIFICANT CHANGE UP (ref 0–1.5)
LYMPHOCYTES # BLD AUTO: 0.61 K/UL — LOW (ref 1–3.3)
LYMPHOCYTES # BLD AUTO: 8.3 % — LOW (ref 13–44)
MCHC RBC-ENTMCNC: 30.9 PG — SIGNIFICANT CHANGE UP (ref 27–34)
MCHC RBC-ENTMCNC: 33.9 GM/DL — SIGNIFICANT CHANGE UP (ref 32–36)
MCV RBC AUTO: 91.2 FL — SIGNIFICANT CHANGE UP (ref 80–100)
MONOCYTES # BLD AUTO: 0.45 K/UL — SIGNIFICANT CHANGE UP (ref 0–0.9)
MONOCYTES NFR BLD AUTO: 6.1 % — SIGNIFICANT CHANGE UP (ref 2–14)
NEUTROPHILS # BLD AUTO: 6.23 K/UL — SIGNIFICANT CHANGE UP (ref 1.8–7.4)
NEUTROPHILS NFR BLD AUTO: 85.2 % — HIGH (ref 43–77)
NRBC # BLD: 0 /100 WBCS — SIGNIFICANT CHANGE UP (ref 0–0)
PLATELET # BLD AUTO: 192 K/UL — SIGNIFICANT CHANGE UP (ref 150–400)
POTASSIUM SERPL-MCNC: 4.1 MMOL/L — SIGNIFICANT CHANGE UP (ref 3.5–5.3)
POTASSIUM SERPL-SCNC: 4.1 MMOL/L — SIGNIFICANT CHANGE UP (ref 3.5–5.3)
PROCALCITONIN SERPL-MCNC: 0.49 NG/ML — HIGH (ref 0–0.04)
PROT SERPL-MCNC: 7.7 G/DL — SIGNIFICANT CHANGE UP (ref 6–8.3)
PROT SERPL-MCNC: 7.9 G/DL — SIGNIFICANT CHANGE UP (ref 6–8.3)
RBC # BLD: 4.86 M/UL — SIGNIFICANT CHANGE UP (ref 4.2–5.8)
RBC # FLD: 13.5 % — SIGNIFICANT CHANGE UP (ref 10.3–14.5)
SODIUM SERPL-SCNC: 138 MMOL/L — SIGNIFICANT CHANGE UP (ref 135–145)
TROPONIN I, HIGH SENSITIVITY RESULT: 12 NG/L — SIGNIFICANT CHANGE UP
WBC # BLD: 7.32 K/UL — SIGNIFICANT CHANGE UP (ref 3.8–10.5)
WBC # FLD AUTO: 7.32 K/UL — SIGNIFICANT CHANGE UP (ref 3.8–10.5)

## 2021-12-28 PROCEDURE — 93010 ELECTROCARDIOGRAM REPORT: CPT | Mod: 76

## 2021-12-28 RX ORDER — SODIUM CHLORIDE 9 MG/ML
1000 INJECTION INTRAMUSCULAR; INTRAVENOUS; SUBCUTANEOUS
Refills: 0 | Status: DISCONTINUED | OUTPATIENT
Start: 2021-12-28 | End: 2021-12-30

## 2021-12-28 RX ORDER — ENOXAPARIN SODIUM 100 MG/ML
90 INJECTION SUBCUTANEOUS ONCE
Refills: 0 | Status: COMPLETED | OUTPATIENT
Start: 2021-12-28 | End: 2021-12-28

## 2021-12-28 RX ORDER — METOPROLOL TARTRATE 50 MG
2.5 TABLET ORAL ONCE
Refills: 0 | Status: COMPLETED | OUTPATIENT
Start: 2021-12-28 | End: 2021-12-28

## 2021-12-28 RX ORDER — METOPROLOL TARTRATE 50 MG
25 TABLET ORAL
Refills: 0 | Status: DISCONTINUED | OUTPATIENT
Start: 2021-12-28 | End: 2022-01-07

## 2021-12-28 RX ADMIN — HEPARIN SODIUM 5000 UNIT(S): 5000 INJECTION INTRAVENOUS; SUBCUTANEOUS at 17:30

## 2021-12-28 RX ADMIN — SODIUM CHLORIDE 50 MILLILITER(S): 9 INJECTION INTRAMUSCULAR; INTRAVENOUS; SUBCUTANEOUS at 23:31

## 2021-12-28 RX ADMIN — ENOXAPARIN SODIUM 90 MILLIGRAM(S): 100 INJECTION SUBCUTANEOUS at 23:44

## 2021-12-28 RX ADMIN — Medication 400 MILLIGRAM(S): at 17:30

## 2021-12-28 RX ADMIN — Medication 100 MILLIGRAM(S): at 05:13

## 2021-12-28 RX ADMIN — ARIPIPRAZOLE 25 MILLIGRAM(S): 15 TABLET ORAL at 11:33

## 2021-12-28 RX ADMIN — PIPERACILLIN AND TAZOBACTAM 25 GRAM(S): 4; .5 INJECTION, POWDER, LYOPHILIZED, FOR SOLUTION INTRAVENOUS at 23:31

## 2021-12-28 RX ADMIN — Medication 650 MILLIGRAM(S): at 21:27

## 2021-12-28 RX ADMIN — Medication 100 MICROGRAM(S): at 05:14

## 2021-12-28 RX ADMIN — PANTOPRAZOLE SODIUM 40 MILLIGRAM(S): 20 TABLET, DELAYED RELEASE ORAL at 06:11

## 2021-12-28 RX ADMIN — Medication 650 MILLIGRAM(S): at 05:22

## 2021-12-28 RX ADMIN — DIVALPROEX SODIUM 750 MILLIGRAM(S): 500 TABLET, DELAYED RELEASE ORAL at 11:33

## 2021-12-28 RX ADMIN — Medication 1000 MILLIGRAM(S): at 05:15

## 2021-12-28 RX ADMIN — Medication 6 MILLIGRAM(S): at 05:18

## 2021-12-28 RX ADMIN — Medication 400 MILLIGRAM(S): at 05:15

## 2021-12-28 RX ADMIN — Medication 1000 MILLIGRAM(S): at 17:30

## 2021-12-28 RX ADMIN — Medication 2000 UNIT(S): at 05:17

## 2021-12-28 RX ADMIN — PIPERACILLIN AND TAZOBACTAM 25 GRAM(S): 4; .5 INJECTION, POWDER, LYOPHILIZED, FOR SOLUTION INTRAVENOUS at 11:33

## 2021-12-28 RX ADMIN — REMDESIVIR 500 MILLIGRAM(S): 5 INJECTION INTRAVENOUS at 17:30

## 2021-12-28 RX ADMIN — Medication 2000 UNIT(S): at 17:30

## 2021-12-28 RX ADMIN — HEPARIN SODIUM 5000 UNIT(S): 5000 INJECTION INTRAVENOUS; SUBCUTANEOUS at 05:17

## 2021-12-28 RX ADMIN — Medication 88 MICROGRAM(S): at 05:14

## 2021-12-28 NOTE — SWALLOW BEDSIDE ASSESSMENT ADULT - SWALLOW EVAL: DIAGNOSIS
1. Patient demonstrates a functional oral phase for pureed, moderately thick, mildly thick, and thin liquids marked by adequate oral acceptance and timely collection, transfer, transport. 2. Patient demonstrates a mild oral dysphagia for regular solids marked by prolonged mastication resulting in delayed collection, transport, and transfer. Trace stasis observed post swallow which reduced with liquid wash. 3. Patient demonstrates a mild pharyngeal dysphagia for pureed, regular solid, moderately thick, and mildly thick liquids marked by suspected delayed pharyngeal swallow trigger and hyolaryngeal elevation noted by digital palpation without evidence of airway penetration/aspiration. 3. Patient demonstrates a moderate/severe pharyngeal dysphagia for thin liquids marked by suspected delayed pharyngeal swallow trigger and increased WOB suggestive of airway penetration/aspiration. 5. Recommend soft & bite-sized and mildly thick liquids, with aspiration precautions, as tolerated.

## 2021-12-28 NOTE — PHYSICAL THERAPY INITIAL EVALUATION ADULT - PERTINENT HX OF CURRENT PROBLEM, REHAB EVAL
Patient is a 61y old male who presents with a chief complaint of low oxygen with covid 1 week now, admitted with ARDS due to COVID19.

## 2021-12-28 NOTE — SWALLOW BEDSIDE ASSESSMENT ADULT - COMMENTS
Consult received and chart reviewed. Patient seen at bedside this afternoon for initial assessment of swallow function. Patient was alert, cooperative, and denied pain. Patient on supplemental O2 via NC. Patient demonstrates ability to follow simple commands, with prompting. Vocal quality WFL.    Per charting, Patient is a Consult received and chart reviewed. Patient seen at bedside this afternoon for initial assessment of swallow function. Patient was alert, cooperative, and denied pain. Patient on supplemental O2 via NC. Patient demonstrates ability to follow simple commands, with prompting. Vocal quality WFL.    Per charting, Patient is a "60 year old male with PMH of bipolar disorder, intellectual developmental disability, anxiety, hypothyroidism, HLD, BPH, eczema who was diagnosed with COVID 1 week ago and was brought in for fever, cough, weakness and body aches and admitted for hypoxia due to COVID pneumonia." WBC WFL. CXR 12/27/21 revealed "Bilateral, predominantly peripheral groundglass airspace opacities, compatible with infection, including atypical/viral pneumonia and COVID 19 pneumonia."    Discussed results and recommendations with Patient, RN, and call out to MD.

## 2021-12-28 NOTE — PHYSICAL THERAPY INITIAL EVALUATION ADULT - ADDITIONAL COMMENTS
Patient is a poor historian. Per Care Coordination Assessment, patient lives in a group home (hx of developmental delay). Patient reports that he ambulates with a cane/RW as needed.

## 2021-12-28 NOTE — PROGRESS NOTE ADULT - PROBLEM SELECTOR PLAN 2
continue iv abxs per id  physical therapy eval  will send another covid in few days  house will not accept is still positive

## 2021-12-28 NOTE — PROVIDER CONTACT NOTE (OTHER) - ACTION/TREATMENT ORDERED:
Unable to leave to reach House MD, unable to leave voicemail.
Per provider ok to give meds as ordered. Will review dosages with patient today

## 2021-12-28 NOTE — PROGRESS NOTE ADULT - ASSESSMENT
NOBLE BONILLA 60 M [address] P 12/26/2021 1958 DR GALEN ROSA     REVIEW OF SYMPTOMS      Able to give ROS  Yes     RELIABLE +/-   CONSTITUTIONAL Weakness Yes  Chills No   ENDOCRINE  No heat or cold intolerance    ALLERGY No hives  Sore throat No stridor  RESP Coughing blood no  Shortness of breath YES   NEURO No Headache  Confusion Pain neck No   CARDIAC No Chest pain No Palpitations   GI  Pain abdomen NO   Vomiting NO     PHYSICAL EXAM    HEENT Unremarkable  atraumatic   RESP Fair air entry EXP prolonged    Harsh breath sound Resp distres mild   CARDIAC S1 S2 No S3     NO JVD    ABDOMEN SOFT BS PRESENT NOT DISTENDED No hepatosplenomegaly PEDAL EDEMA present No calf tenderness  NO rash     ____  DOA CC.  12/26/2021 COVID diagn 12/13  Fever cough  _____                            HOSPITAL COURSE.  COVID poa 12/26/2021   DEXA 12/26  RDVZ 12/27/2021    BACT INFECTN   12/26/2021 zosyn    HYPOTHYROID pmh  NEURO PROBLEMS pmh  12/26/2021 aripiprazole 25  12/26/2021 carbamazepine 400.2   12/26/2021 depakot 1000.2     _____                            GOC. 12/26/2021 Full code   COVID STATUS. 12/26/2021 scv2 (+)   ICU STAY. none    PMH.  PMH GERD   PMH BPH   PMH BIPOLAR  PMH MILD INTELLECTUAL DISABILITY       BEST PRACTICE ISSUES.                                                  HEAD OF BED ELEVATION. Yes  DVT PROPHYLAXIS.     12/27/2021 hpsc   12/26/2021 lvnx 40   dced                                  STEIN PROPHYLAXIS.  12/26/2021 protonix 40                                                                                       DIET.     12/26/2021 puree                      INFECTION PROPHYLAXIS.    SPEECH SWALLOW RECOMMENDATIONS.  12/28/2021 soft bite    IV FLUIDS.     GENERAL ISSUES                                       ALLERGY.   nka                         WEIGHT.     12/26/2021 94                               BMI.                12/26/2021 32   ABIO.   12/26/2021 zosyn Dr Menendez   12/27/2021 Remedna Tilley     12/26/2021 Dexa      PATIENT DATA   VITALS/PO/IO/VENT/DRIPS.   12/28/2021 afeb 80 118/60   12/28/2021 4l 92%     ASSESSMENT/RECOMMENDATIONS.    RESP.   Monitor po Target po 90-95%    HEMODYNAMICS.   Monitor bp Target MAP 65 (+)    OXYGENATION STATUS.   12/28/2021 4l 92%    RO VTE.  V duplx 12/27/2021 v duplx (-)    INFECTION.   Surveillance for bacterial infection.  W 12/26-12/28/2021 w 6.5 - 7.3   pr 12/27-11/28/2021 pr .28 - 0.49  ua 12/27 w 0-2   Fluab 12/26/2021 (-)  rsv 12/26/2021 (-)   Zosyn 12/26/2021 Zosyn Dr Menendez   ID 12/27/2021 Seen by Dr Jarek Zhang is that if Ferritin/pct level over 1000 to dc zosyn     COVID.  Scv2 12/26/2021 (+)  Ferrn 12/27-11/28/2021 ferrn 1024 - 1173   Cr 12/26-12/27-12/28/2021 Cr 1.4 - 1.1-1.3  LFTS 12/26/2021  AP. 149  AST. 39  ALT. 53  ct 12/27/2021 ct ch periph bl opac   remdesevir 12/27/2021 remdesev  Dexa 12/26/2021 dexa 6 x 10d  a/r Monitor inflammatory markers  Monitor thrombotic markers   Monitor oxygenation  12/27/2021 ID started remdes    TIME SPENT   Over 25 minutes aggregate care time spent on encounter; activities included   direct patient care, counseling and/or coordinating care reviewing notes, lab data/ imaging , discussion with multidisciplinary team/ patient  /family and explaining in detail risks, benefits, alternatives  of the recommendations     NOBLE BONILLA 60 M [facility] P 12/26/2021 1958 DR GALEN ROSA

## 2021-12-28 NOTE — PHYSICAL THERAPY INITIAL EVALUATION ADULT - DISCHARGE DISPOSITION, PT EVAL
group home vs mother's house; of note, patient requires 1 person assist for all functional mobility at this time.

## 2021-12-28 NOTE — PROVIDER CONTACT NOTE (OTHER) - SITUATION
Patient /63, HR 68. IV push metoprolol ordered. Unable to administer due to vitals as well as RN unable to administer 1st dose of medication.
Medication clarification

## 2021-12-28 NOTE — PROGRESS NOTE ADULT - SUBJECTIVE AND OBJECTIVE BOX
[facility] DIVISION of INFECTIOUS DISEASE  Rickey Guzman MD PhD, Traa Mcdonald MD, Calista Espinoza MD, Lou Tilley MD, John Moss MD  and providing coverage with Joy Markham MD and Derik Hooker MD  Providing Infectious Disease Consultations at [facility], [facility]'s      Office# [phone] to schedule follow up appointments  Answering Service for urgent calls or New Consults [phone]    Infectious Diseases Progress Note:    CHAD DICKEY is a 61y year old Male patient    COVID-19 Patient  Notes reviewed  No concerning events overnight.   Allergies: No Known Allergies    ANTIBIOTICS/RELEVANT:  Antimicrobials piperacillin/tazobactam IVPB.. 3.375 Gram(s) IV Intermittent every 12 hours  remdesivir  IVPB   IV Intermittent   remdesivir  IVPB 100 milliGRAM(s) IV Intermittent every 24 hours    Immunologic:   Other Meds: acetaminophen     Tablet .. 650 milliGRAM(s) Oral every 6 hours PRN  ARIPiprazole 25 milliGRAM(s) Oral daily  carBAMazepine ER Tablet 400 milliGRAM(s) Oral two times a day  cholecalciferol 2000 Unit(s) Oral two times a day  dexAMETHasone  Injectable 6 milliGRAM(s) IV Push daily  diVALproex  milliGRAM(s) Oral daily  heparin   Injectable 5000 Unit(s) SubCutaneous every 12 hours  levothyroxine 88 MICROGram(s) Oral daily  levothyroxine 100 MICROGram(s) Oral daily  pantoprazole    Tablet 40 milliGRAM(s) Oral before breakfast  valproic acid 1000 milliGRAM(s) Oral two times a day    Objective:  Vital Signs Last 24 Hrs  T(F): 97.9 (28 Dec 2021 11:29), Max: 102.9 (27 Dec 2021 20:50)  HR: 80 (28 Dec 2021 11:29) (69 - 82)  BP: 118/67 (28 Dec 2021 11:29) (116/70 - 136/68)  RR: 20 (28 Dec 2021 11:29) (18 - 20)  SpO2: 89% (28 Dec 2021 11:29) (84% - 92%)  PHYSICAL EXAM:  HEENT: NC/AT, anicteric, supple  Respiratory: no acc muscle use, breathing comfortably  Cardiovascular: S1 S2 present, normal rate  Gastrointestinal: normal appearing, nondistended  Extremities: no edema, no cyanosis    LABS:                        15.2   7.36  )-----------( 170      ( 27 Dec 2021 09:36 )             44.9     WBC trend:  7.36  @ 09:36  6.51  @ 20:50        x   |  x   |  x   ----------------------------<  x   x    |  x   |  1.20    Ca    8.5      27 Dec 2021 09:36  Phos  2.1         TPro  7.9  /  Alb  2.5<L>  /  TBili  0.5  /  DBili  0.1  /  AST  165<H>  /  ALT  56  /  AlkPhos  52      Creatinine, Serum: 1.20 mg/dL (21 @ 07:43)  Creatinine, Serum: 1.10 mg/dL (21 @ 09:36)  Creatinine, Serum: 1.40 mg/dL (21 @ 20:50)    PT/INR - ( 27 Dec 2021 09:36 )   PT: 13.4 sec;   INR: 1.15 ratio      Urinalysis Basic - ( 27 Dec 2021 22:37 )  Color: Yellow / Appearance: Clear / S.015 / pH: x  Gluc: x / Ketone: Negative  / Bili: Negative / Urobili: Negative   Blood: x / Protein: 15 / Nitrite: Negative   Leuk Esterase: Negative / RBC: 0-2 /HPF / WBC 0-2   Sq Epi: x / Non Sq Epi: Occasional / Bacteria: Occasional    Auto Neutrophil #: 6.37 K/uL (21 @ 09:36)  Auto Neutrophil #: 4.51 K/uL (21 @ 20:50)    Auto Lymphocyte #: 0.51 K/uL (21 @ 09:36)  Auto Lymphocyte #: 0.99 K/uL (21 @ 20:50)    Procalcitonin, Serum: 0.49 ng/mL (21 @ 07:43)  Procalcitonin, Serum: 0.28 ng/mL (21 @ 09:36)    Ferritin, Serum: 1024 ng/mL (12-27-21 @ 12:15)    D-Dimer Assay, Quantitative: 217 ng/mL DDU (21 @ 09:36)    Lactate, Blood: 2.0 mmol/L (21 @ 09:37)    Prothrombin Time, Plasma: 13.4 sec (21 @ 09:36)    C-Reactive Protein, Serum: 199 mg/L (21 @ 02:42)    MICROBIOLOGY: reviewed    RADIOLOGY & ADDITIONAL STUDIES: reviewed

## 2021-12-28 NOTE — DIETITIAN INITIAL EVALUATION ADULT. - PERTINENT LABORATORY DATA
12-28 Na n/a   Glu n/a   K+ n/a   Cr  1.20 mg/dL BUN n/a   Phos n/a   Alb 2.5 g/dL<L> PAB n/a   Hgb n/a   Hct n/a

## 2021-12-28 NOTE — PROGRESS NOTE ADULT - ASSESSMENT
Patient is a 60 year old male with PMH of bipolar disorder, intellectual developmental disability, anxiety, hypothyroidism, HLD, BPH, eczema who was diagnosed with COVID 1 week ago and was brought in for fever, cough, weakness and body aches and admitted for hypoxia due to COVID pneumonia.   +COVID on admission, negative flu  CXR reviewed, b/l infiltrates  CT chest imaging reviewed, c/w COVID  Vaccinated with Pfizer x2  NLR on admission - 4.51/0.99= 4.6    Recommendations:  12/27 - afebrile, on NC 1.5L, NLR -6.37/0.51 = 12.5. Started on dexamethasone and remdesivir. Started on pip-tazo for possible bacterial pna, will check for ferritin/pct, if ratio >1000 then would d/c pip-tazo. On heparin for vte ppx. Prone as tolerated. Monitor CBC with diff, inflammatory markers.    12/28 - febrile last night likely d/t COVID, remains on NC, continue remdesivir x5d, dexamethasone x10d. FPR ~3600 - low suspicion for superimposed bacterial pneumonia. UA negative. Follow blood cultures - if remains negative x48h - discontinue pip-tazo. Supplemental O2 as needed, wean as tolerated. Continue supportive care. Monitor temps, CBC, LFTs

## 2021-12-28 NOTE — SWALLOW BEDSIDE ASSESSMENT ADULT - SWALLOW EVAL: CURRENT DIET
pureed and moderately thick liquids, as per MD order minced & moist and thin liquids, as per MD jalloh

## 2021-12-28 NOTE — SWALLOW BEDSIDE ASSESSMENT ADULT - PHARYNGEAL PHASE
Delayed pharyngeal swallow/Decreased laryngeal elevation increased WOB/Delayed pharyngeal swallow/Decreased laryngeal elevation

## 2021-12-28 NOTE — PROGRESS NOTE ADULT - SUBJECTIVE AND OBJECTIVE BOX
CHAD DICKEY    PLV 2NOR 242 W1    Allergies    No Known Allergies    Intolerances        PAST MEDICAL & SURGICAL HISTORY:  Hypothyroid        FAMILY HISTORY:      Home Medications:  ARIPiprazole 10 mg oral tablet: 0.5 tab(s) orally once a day (in the morning)      *Takes along with 20mg for a TDD: 25mg * (2021 10:12)  ARIPiprazole 20 mg oral tablet: 1 tab(s) orally once a day (in the morning)    *Takes along with 5mg for a TDD: 25mg* (2021 10:12)  Calcium 600+D oral tablet: 1 tab(s) orally 2 times a day (2021 10:12)  carBAMazepine 400 mg oral tablet, extended release: 1 tab(s) orally 2 times a day (2021 10:12)  Cetaphil topical lotion: Apply topically to affected area 2 times a day (2021 10:12)  divalproex sodium 250 mg oral delayed release tablet: 1 tab(s) orally once a day (in the morning)    *Takes along with 500mg for a TDD in the morninmg  (2021 10:12)  divalproex sodium 500 mg oral delayed release tablet: 2 tab(s) orally 2 times a day at 3pm and 8pm  (2021 10:12)  Fish Oil 1000 mg oral capsule: 1 cap(s) orally once a day (in the morning) go back to usual home dose (15 Raymond 2021 19:47)  levothyroxine 100 mcg (0.1 mg) oral tablet: 1 tab(s) orally once a day     *Takes with 88mcg (1hr apart) for a TDD; 188mcg* follow home protocol (15 Raymond 2021 19:47)  levothyroxine 88 mcg (0.088 mg) oral tablet: 1 tab(s) orally once a day    *Takes with 100mcg (1hr apart) for a TDD; 188mcg * follow home protocol (15 Raymond 2021 19:47)  OLANZapine 20 mg oral tablet: 0.5 tab(s) orally once a day (in the evening) (2021 10:12)  oxybutynin 10 mg/24 hr oral tablet, extended release: 1 tab(s) orally once a day (2021 10:12)  tamsulosin 0.4 mg oral capsule: 1 cap(s) orally once a day (at bedtime) (2021 10:12)  Vitamin D3 1000 intl units (25 mcg) oral tablet:  (2021 10:12)      MEDICATIONS  (STANDING):  ARIPiprazole 25 milliGRAM(s) Oral daily  carBAMazepine ER Tablet 400 milliGRAM(s) Oral two times a day  cholecalciferol 2000 Unit(s) Oral two times a day  dexAMETHasone  Injectable 6 milliGRAM(s) IV Push daily  diVALproex  milliGRAM(s) Oral daily  heparin   Injectable 5000 Unit(s) SubCutaneous every 12 hours  levothyroxine 88 MICROGram(s) Oral daily  levothyroxine 100 MICROGram(s) Oral daily  pantoprazole    Tablet 40 milliGRAM(s) Oral before breakfast  piperacillin/tazobactam IVPB.. 3.375 Gram(s) IV Intermittent every 12 hours  remdesivir  IVPB   IV Intermittent   remdesivir  IVPB 100 milliGRAM(s) IV Intermittent every 24 hours  sodium chloride 0.9%. 1000 milliLiter(s) (40 mL/Hr) IV Continuous <Continuous>  valproic acid 1000 milliGRAM(s) Oral two times a day    MEDICATIONS  (PRN):  acetaminophen     Tablet .. 650 milliGRAM(s) Oral every 6 hours PRN Temp greater or equal to 38C (100.4F)      Diet, Pureed:   Moderately Thick Liquids (MODTHICKLIQS) (21 @ 22:42) [Active]          Vital Signs Last 24 Hrs  T(C): 37.2 (28 Dec 2021 08:15), Max: 39.4 (27 Dec 2021 20:50)  T(F): 99 (28 Dec 2021 08:15), Max: 102.9 (27 Dec 2021 20:50)  HR: 69 (28 Dec 2021 05:03) (69 - 82)  BP: 136/68 (28 Dec 2021 05:03) (116/70 - 136/68)  BP(mean): --  RR: 20 (28 Dec 2021 05:03) (18 - 20)  SpO2: 92% (28 Dec 2021 06:02) (84% - 92%)      21 @ 07:01  -  21 @ 07:00  --------------------------------------------------------  IN: 720 mL / OUT: 700 mL / NET: 20 mL              LABS:                        15.2   7.36  )-----------( 170      ( 27 Dec 2021 09:36 )             44.9         x   |  x   |  x   ----------------------------<  x   x    |  x   |  1.20    Ca    8.5      27 Dec 2021 09:36  Phos  2.1         TPro  7.9  /  Alb  2.5<L>  /  TBili  0.5  /  DBili  0.1  /  AST  165<H>  /  ALT  56  /  AlkPhos  52      PT/INR - ( 27 Dec 2021 09:36 )   PT: 13.4 sec;   INR: 1.15 ratio           Urinalysis Basic - ( 27 Dec 2021 22:37 )    Color: Yellow / Appearance: Clear / S.015 / pH: x  Gluc: x / Ketone: Negative  / Bili: Negative / Urobili: Negative   Blood: x / Protein: 15 / Nitrite: Negative   Leuk Esterase: Negative / RBC: 0-2 /HPF / WBC 0-2   Sq Epi: x / Non Sq Epi: Occasional / Bacteria: Occasional            WBC:  WBC Count: 7.36 K/uL ( @ 09:36)  WBC Count: 6.51 K/uL ( @ 20:50)      MICROBIOLOGY:  RECENT CULTURES:              PT/INR - ( 27 Dec 2021 09:36 )   PT: 13.4 sec;   INR: 1.15 ratio             Sodium:  Sodium, Serum: 138 mmol/L ( @ 09:36)  Sodium, Serum: 137 mmol/L ( @ 20:50)      1.20 mg/dL  @ 07:43  1.10 mg/dL  @ 09:36  1.40 mg/dL  @ 20:50      Hemoglobin:  Hemoglobin: 15.2 g/dL ( @ 09:36)  Hemoglobin: 14.8 g/dL ( @ 20:50)      Platelets: Platelet Count - Automated: 170 K/uL ( @ 09:36)  Platelet Count - Automated: 172 K/uL ( @ 20:50)      LIVER FUNCTIONS - ( 28 Dec 2021 07:43 )  Alb: 2.5 g/dL / Pro: 7.9 g/dL / ALK PHOS: 52 U/L / ALT: 56 U/L / AST: 165 U/L / GGT: x             Urinalysis Basic - ( 27 Dec 2021 22:37 )    Color: Yellow / Appearance: Clear / S.015 / pH: x  Gluc: x / Ketone: Negative  / Bili: Negative / Urobili: Negative   Blood: x / Protein: 15 / Nitrite: Negative   Leuk Esterase: Negative / RBC: 0-2 /HPF / WBC 0-2   Sq Epi: x / Non Sq Epi: Occasional / Bacteria: Occasional        RADIOLOGY & ADDITIONAL STUDIES:      MICROBIOLOGY:  RECENT CULTURES:

## 2021-12-28 NOTE — CHART NOTE - NSCHARTNOTEFT_GEN_A_CORE
Called by RN for Pt with desaturation to low 80's on 4L, but improved to 93% on 5L. RN also stated appeared to be tachycardic to 150's with irregular rhythm. Patient seen at bedside. States he is comfortable at this time. Denies sob, cp, n/v, pain, dizziness, palpitations.         T(C): 37.7 (21 @ 23:09), Max: 39.1 (21 @ 21:10)  HR: 75 (21 @ 23:09) (69 - 154)  BP: 109/63 (21 @ 23:09) (109/63 - 138/71)  RR: 91 (21 @ 23:09) (20 - 91)  SpO2: 92% (21 @ 21:47) (82% - 92%)  Wt(kg): --    Physical :  Gen- NAD, ncat  Cardio - s+1,s+2, rrr, no murmur  Lung - cta b/l, no wheeze, no rhonchi, no rales   Abdomen- +BS, NT/ND, no guarding, no rebound, no masses  Ext- no edema, 2+ pulses b/l  Neuro- CN grossly intact, strength 5/5 b/l extrem    LABS:                        15.0   7.32  )-----------( 192      ( 28 Dec 2021 14:29 )             44.3         138  |  106  |  30<H>  ----------------------------<  157<H>  4.1   |  22  |  1.30    Ca    8.1<L>      28 Dec 2021 14:29  Phos  2.1         TPro  7.7  /  Alb  2.4<L>  /  TBili  0.4  /  DBili  x   /  AST  144<H>  /  ALT  53  /  AlkPhos  50      PT/INR - ( 27 Dec 2021 09:36 )   PT: 13.4 sec;   INR: 1.15 ratio           Urinalysis Basic - ( 27 Dec 2021 22:37 )    Color: Yellow / Appearance: Clear / S.015 / pH: x  Gluc: x / Ketone: Negative  / Bili: Negative / Urobili: Negative   Blood: x / Protein: 15 / Nitrite: Negative   Leuk Esterase: Negative / RBC: 0-2 /HPF / WBC 0-2   Sq Epi: x / Non Sq Epi: Occasional / Bacteria: Occasional              Assessment/Plan  61yMale admitted for   1. Called by RN for Pt with desaturation to low 80's on 4L, but improved to 93% on 5L. RN also stated appeared to be tachycardic to 150's with irregular rhythm. Patient seen at bedside. States he is comfortable at this time. Denies sob, cp, n/v, pain, dizziness, palpitations. Of note, pt was febrile to 102.3F at that time.       Physical :  Gen- NAD, ncat  Cardio - s+1,s+2, irregularly irregular  Lung - decreased bs b/l, no wheeze, no rhonchi, no rales   Abdomen- +BS, NT/ND  Ext- no edema, 2+ pulses b/l  Neuro- awake and alert; conversant. Moving all 4 extremities.           Assessment/Plan  Patient is a 60 year old male with PMH of bipolar disorder, intellectual developmental disability, anxiety, hypothyroidism, HLD, BPH, eczema who was diagnosed with COVID 1 week ago and was brought in for fever, cough, weakness and body aches and admitted for hypoxia due to COVID pneumonia. Now with new onset aflutter suspected likely 2/2 infection/febrile    1. STAT EKG prelim read consistent with aflutter with . Pt placed on remote tele and continuous pulse ox  2. STAT metoprolol 2.5mg IVP ordered, tylenol for fever  3. STAT Ddimer ordered as PE cannot be ruled at this time; if significantly elevated will consider VQ scan. However, pt will receive STAT full dose AC with lovenox for new onset aflutter. Pt saturating 93% on 5L at this time and was previously on 4L NC.  4. STAT and CKMB troponin wnl. CK elevated at 1319. Pt started on IVF. Trend CK  5. TTE, TSH, and Metoprolol tartrate 25mg BID ordered.  6. Cardiology, Dr. Trinidad, consult placed. Case discussed with Dr. Trinidad who agrees with plan above and will see pt in AM  7. Attending Dr. Yu made aware and agrees with plan above  8. RN to call with changes

## 2021-12-28 NOTE — PROVIDER CONTACT NOTE (CHANGE IN STATUS NOTIFICATION) - ASSESSMENT
Patient is alert & oriented x 4, breathing on nasal cannula and tolerating well, on continuous tele with spo2 monitoring. All other VSS with exception of temperature 102.9 F. Denies pain. Resting comfortably. Patient/Caregiver provided printed discharge information.

## 2021-12-28 NOTE — PROGRESS NOTE ADULT - SUBJECTIVE AND OBJECTIVE BOX
PAST MEDICAL & SURGICAL HISTORY:  Hypothyroid        MEDICATIONS  (STANDING):  ARIPiprazole 25 milliGRAM(s) Oral daily  carBAMazepine ER Tablet 400 milliGRAM(s) Oral two times a day  cholecalciferol 2000 Unit(s) Oral two times a day  dexAMETHasone  Injectable 6 milliGRAM(s) IV Push daily  diVALproex  milliGRAM(s) Oral daily  heparin   Injectable 5000 Unit(s) SubCutaneous every 12 hours  levothyroxine 88 MICROGram(s) Oral daily  levothyroxine 100 MICROGram(s) Oral daily  pantoprazole    Tablet 40 milliGRAM(s) Oral before breakfast  piperacillin/tazobactam IVPB.. 3.375 Gram(s) IV Intermittent every 12 hours  remdesivir  IVPB   IV Intermittent   remdesivir  IVPB 100 milliGRAM(s) IV Intermittent every 24 hours  valproic acid 1000 milliGRAM(s) Oral two times a day    MEDICATIONS  (PRN):  acetaminophen     Tablet .. 650 milliGRAM(s) Oral every 6 hours PRN Temp greater or equal to 38C (100.4F)      Patient is a 61y old  Male who presents with a chief complaint of low oxygen with covid 1 week now (28 Dec 2021 12:09)      Vital Signs Last 24 Hrs  T(C): 36.6 (28 Dec 2021 11:29), Max: 39.4 (27 Dec 2021 20:50)  T(F): 97.9 (28 Dec 2021 11:29), Max: 102.9 (27 Dec 2021 20:50)  HR: 80 (28 Dec 2021 11:29) (69 - 82)  BP: 118/67 (28 Dec 2021 11:29) (116/70 - 136/68)  BP(mean): --  RR: 20 (28 Dec 2021 11:29) (18 - 20)  SpO2: 89% (28 Dec 2021 11:29) (84% - 92%)           @ 07:01  -   @ 07:00  --------------------------------------------------------  IN: 720 mL / OUT: 700 mL / NET: 20 mL        REVIEW OF SYSTEMS:    Constitutional: No fever, weight loss or fatigue  Eyes: No eye pain, visual disturbances, or discharge  ENT:  No difficulty hearing, tinnitus, vertigo; No sinus or throat pain  Neck: No pain or stiffness  Breasts: No pain, masses or nipple discharge  Respiratory: No cough, wheezing, chills or hemoptysis  Cardiovascular: No chest pain, palpitations, shortness of breath, dizziness or leg swelling  Gastrointestinal: No abdominal or epigastric pain. No nausea, vomiting or hematemesis; No diarrhea or constipation. No melena or hematochezia.  Genitourinary: No dysuria, frequency, hematuria or incontinence  Rectal: No pain, hemorrhoids or incontinence  Neurological: No headaches, memory loss, loss of strength, numbness or tremors  Skin: No itching, burning, rashes or lesions   Lymph Nodes: No enlarged glands  Endocrine: No heat or cold intolerance; No hair loss  Musculoskeletal: No joint pain or swelling; No muscle, back or extremity pain  Psychiatric: No depression, anxiety, mood swings or difficulty sleeping  Heme/Lymph: No easy bruising or bleeding gums  Allergy and Immunologic: No hives or eczema    PHYSICAL EXAM:    Constitutional: NAD, well-groomed, well-developed  HEENT: PERRLA, EOMI, Normal Hearing, MMM  Neck: No LAD, No JVD  Back: Normal spine flexure, No CVA tenderness  Respiratory: CTAB/L   Cardiovascular: S1 and S2, RRR, no M/G/R  Gastrointestinal: BS+, soft, NT/ND  Extremities: No peripheral edema  Vascular: 2+ peripheral pulses  Neurological: A/O x 3, no focal deficits  Skin: No rashes      decubiti:                           15.2   7.36  )-----------( 170      ( 27 Dec 2021 09:36 )             44.9         x   |  x   |  x   ----------------------------<  x   x    |  x   |  1.20    Ca    8.5      27 Dec 2021 09:36  Phos  2.1         TPro  7.9  /  Alb  2.5<L>  /  TBili  0.5  /  DBili  0.1  /  AST  165<H>  /  ALT  56  /  AlkPhos  52      PT/INR - ( 27 Dec 2021 09:36 )   PT: 13.4 sec;   INR: 1.15 ratio           Urinalysis Basic - ( 27 Dec 2021 22:37 )    Color: Yellow / Appearance: Clear / S.015 / pH: x  Gluc: x / Ketone: Negative  / Bili: Negative / Urobili: Negative   Blood: x / Protein: 15 / Nitrite: Negative   Leuk Esterase: Negative / RBC: 0-2 /HPF / WBC 0-2   Sq Epi: x / Non Sq Epi: Occasional / Bacteria: Occasional                    Radiology:     PAST MEDICAL & SURGICAL HISTORY:  Hypothyroid        MEDICATIONS  (STANDING):  ARIPiprazole 25 milliGRAM(s) Oral daily  carBAMazepine ER Tablet 400 milliGRAM(s) Oral two times a day  cholecalciferol 2000 Unit(s) Oral two times a day  dexAMETHasone  Injectable 6 milliGRAM(s) IV Push daily  diVALproex  milliGRAM(s) Oral daily  heparin   Injectable 5000 Unit(s) SubCutaneous every 12 hours  levothyroxine 88 MICROGram(s) Oral daily  levothyroxine 100 MICROGram(s) Oral daily  pantoprazole    Tablet 40 milliGRAM(s) Oral before breakfast  piperacillin/tazobactam IVPB.. 3.375 Gram(s) IV Intermittent every 12 hours  remdesivir  IVPB   IV Intermittent   remdesivir  IVPB 100 milliGRAM(s) IV Intermittent every 24 hours  valproic acid 1000 milliGRAM(s) Oral two times a day    MEDICATIONS  (PRN):  acetaminophen     Tablet .. 650 milliGRAM(s) Oral every 6 hours PRN Temp greater or equal to 38C (100.4F)      Patient is a 61y old  Male who presents with a chief complaint of low oxygen with covid 1 week now (28 Dec 2021 12:09)      Vital Signs Last 24 Hrs  T(C): 36.6 (28 Dec 2021 11:29), Max: 39.4 (27 Dec 2021 20:50)  T(F): 97.9 (28 Dec 2021 11:29), Max: 102.9 (27 Dec 2021 20:50)  HR: 80 (28 Dec 2021 11:29) (69 - 82)  BP: 118/67 (28 Dec 2021 11:29) (116/70 - 136/68)  BP(mean): --  RR: 20 (28 Dec 2021 11:29) (18 - 20)  SpO2: 89% (28 Dec 2021 11:29) (84% - 92%)           @ 07:01  -   @ 07:00  --------------------------------------------------------  IN: 720 mL / OUT: 700 mL / NET: 20 mL        REVIEW OF SYSTEMS:    Constitutional: No fever, weight loss or fatigue  Eyes: No eye pain, visual disturbances, or discharge  ENT:  No difficulty hearing, tinnitus, vertigo; No sinus or throat pain  Neck: No pain or stiffness  Breasts: No pain, masses or nipple discharge  Respiratory: No cough, wheezing, chills or hemoptysis  Cardiovascular: No chest pain, palpitations, shortness of breath, dizziness or leg swelling  Gastrointestinal: No abdominal or epigastric pain. No nausea, vomiting or hematemesis; No diarrhea or constipation. No melena or hematochezia.  Genitourinary: No dysuria, frequency, hematuria or incontinence  Rectal: No pain, hemorrhoids or incontinence  Neurological: No headaches, memory loss, loss of strength, numbness or tremors  Skin: No itching, burning, rashes or lesions   Lymph Nodes: No enlarged glands  Endocrine: No heat or cold intolerance; No hair loss  Musculoskeletal: No joint pain or swelling; No muscle, back or extremity pain  Psychiatric: No depression, anxiety, mood swings or difficulty sleeping  Heme/Lymph: No easy bruising or bleeding gums  Allergy and Immunologic: No hives or eczema    PHYSICAL EXAM:  carries full conversation  Constitutional: NAD, Respiratory: CTAB/L   Cardiovascular: S1 and S2, RRR, no M/G/R  Gastrointestinal: BS+, soft, NT/ND  Extremities: No peripheral edema  Vascular: 2+ peripheral pulses  Neurological: A/O x 3, no focal deficits  Skin: No rashes      decubiti:                           15.2   7.36  )-----------( 170      ( 27 Dec 2021 09:36 )             44.9         x   |  x   |  x   ----------------------------<  x   x    |  x   |  1.20    Ca    8.5      27 Dec 2021 09:36  Phos  2.1         TPro  7.9  /  Alb  2.5<L>  /  TBili  0.5  /  DBili  0.1  /  AST  165<H>  /  ALT  56  /  AlkPhos  52      PT/INR - ( 27 Dec 2021 09:36 )   PT: 13.4 sec;   INR: 1.15 ratio           Urinalysis Basic - ( 27 Dec 2021 22:37 )    Color: Yellow / Appearance: Clear / S.015 / pH: x  Gluc: x / Ketone: Negative  / Bili: Negative / Urobili: Negative   Blood: x / Protein: 15 / Nitrite: Negative   Leuk Esterase: Negative / RBC: 0-2 /HPF / WBC 0-2   Sq Epi: x / Non Sq Epi: Occasional / Bacteria: Occasional                    Radiology:

## 2021-12-28 NOTE — DIETITIAN INITIAL EVALUATION ADULT. - OTHER INFO
60 y/o male adm with infection due to severe acute respiratory syndrome, COVID 19. PMH hypohyroidism, profound MR, chronic seizures. Pt visited at bedside this am. Pt reports that he is usually a good eater. Eats "regular consistency diet at home". On a calorie restricted diet. Sometimes gets a burger from [employer]. Call placed to [facility]. Pt is on a regular consistency diet with thin liquids/calorie restricted. Call placed to Dr. Benedict Hooker. Rx SLP eval. RN made aware message from Dr. Benedict Hooker to order MEREDITH larkin for pt.

## 2021-12-28 NOTE — SWALLOW BEDSIDE ASSESSMENT ADULT - ASR SWALLOW ASPIRATION MONITOR
Progress Note - General Surgery   Aura Chung 77 y o  female MRN: [id number]  Unit/Bed#: ICU 02 Encounter: [id number]    Assessment:  78 yo F w/ septic shock s/p ventral hernia repair, now s/p Ex lap w/ SBR for iatrogenic jejunal injury  Pt extubated yesterday and well-appearin since that time, saturating adequately on RA  Wound vac removed and skin sutured closed  CT no longer with air leak  Blood culture grew out yeast in one bottle  Pt had fever overnight  Plan:  Daily dressing changes  Continue with current IV antibiotics  No indication for anti-fungal therapy at this time as yeast only grew in one culture bottle and pt does not have any signs/symptoms of fungemia  If persistently spiking fever, however, may be prudent to re-culture, broaden abx coverage, add anti-fungal +/- CT abd/pelvis  Supplemental O2 prn  Switch CT to water seal and repeat CXR tomorrow  Replete electrolytes  Subjective/Objective   Chief Complaint:     Subjective: No acute issues  Off pressors since 11 pm last night  Failed SBT yesterday    Objective:     Blood pressure 110/58, pulse 94, temperature 100 4 °F (38 °C), resp  rate (!) 24, height 5' 4" (1 626 m), weight 78 3 kg (172 lb 9 9 oz), SpO2 96 %  ,Body mass index is 29 63 kg/m²  Intake/Output Summary (Last 24 hours) at 8/6/2019 0854  Last data filed at 8/6/2019 0818  Gross per 24 hour   Intake 4165 58 ml   Output 4150 ml   Net 15 58 ml       Invasive Devices     Central Venous Catheter Line            CVC Central Lines 08/02/19 Triple 3 days          Arterial Line            Arterial Line 08/03/19 Left Radial 2 days          Drain            Gastrostomy/Enterostomy Jejunostomy 14 Fr   days    Chest Tube 1 Left 12 Fr  3 days    Urethral Catheter Latex;Straight-tip 16 Fr  3 days                Physical Exam:   Gen: A&O, NAD  Cardio: RRR  Lungs: CTAB, Unlabored, Left sided CT in place without an air leak  Abd: Soft, non distended, non tender   Sutures c/d/i - mild serosanguineous drainage from superior-most aspect of the surgical wound  Dressing changed        Lab, Imaging and other studies:  CBC:   Lab Results   Component Value Date    WBC 17 93 (H) 08/06/2019    HGB 9 9 (L) 08/06/2019    HCT 30 5 (L) 08/06/2019    MCV 86 08/06/2019     08/06/2019    MCH 28 0 08/06/2019    MCHC 32 5 08/06/2019    RDW 15 5 (H) 08/06/2019    MPV 10 7 08/06/2019    NRBC 0 08/06/2019   , CMP:   Lab Results   Component Value Date    SODIUM 144 08/06/2019    K 2 9 (L) 08/06/2019     08/06/2019    CO2 26 08/06/2019    BUN 9 08/06/2019    CREATININE 0 41 (L) 08/06/2019    CALCIUM 7 5 (L) 08/06/2019    EGFR 105 08/06/2019   , Coagulation: No results found for: PT, INR, APTT, Urinalysis: No results found for: COLORU, CLARITYU, SPECGRAV, PHUR, LEUKOCYTESUR, NITRITE, PROTEINUA, GLUCOSEU, KETONESU, BILIRUBINUR, BLOODU  VTE Pharmacologic Prophylaxis: Heparin  VTE Mechanical Prophylaxis: sequential compression device change of breathing pattern/oral hygiene/position upright (90Y)/cough/gurgly voice/fever/pneumonia/throat clearing/upper respiratory infection

## 2021-12-28 NOTE — SWALLOW BEDSIDE ASSESSMENT ADULT - SWALLOW EVAL: RECOMMENDED FEEDING/EATING TECHNIQUES
alternate food with liquid/maintain upright posture during/after eating for 30 mins/no straws/oral hygiene/position upright (90 degrees)/small sips/bites

## 2021-12-28 NOTE — DIETITIAN INITIAL EVALUATION ADULT. - PERTINENT MEDS FT
MEDICATIONS  (STANDING):  ARIPiprazole 25 milliGRAM(s) Oral daily  carBAMazepine ER Tablet 400 milliGRAM(s) Oral two times a day  cholecalciferol 2000 Unit(s) Oral two times a day  dexAMETHasone  Injectable 6 milliGRAM(s) IV Push daily  diVALproex  milliGRAM(s) Oral daily  heparin   Injectable 5000 Unit(s) SubCutaneous every 12 hours  levothyroxine 88 MICROGram(s) Oral daily  levothyroxine 100 MICROGram(s) Oral daily  pantoprazole    Tablet 40 milliGRAM(s) Oral before breakfast  piperacillin/tazobactam IVPB.. 3.375 Gram(s) IV Intermittent every 12 hours  remdesivir  IVPB   IV Intermittent   remdesivir  IVPB 100 milliGRAM(s) IV Intermittent every 24 hours  sodium chloride 0.9%. 1000 milliLiter(s) (40 mL/Hr) IV Continuous <Continuous>  valproic acid 1000 milliGRAM(s) Oral two times a day    MEDICATIONS  (PRN):  acetaminophen     Tablet .. 650 milliGRAM(s) Oral every 6 hours PRN Temp greater or equal to 38C (100.4F)

## 2021-12-28 NOTE — PHYSICAL THERAPY INITIAL EVALUATION ADULT - GENERAL OBSERVATIONS, REHAB EVAL
Received supine in bed with + heplock IV, 4L NC 02 with SP02 85% at rest. Patient in no apparent distress.

## 2021-12-29 LAB
ALBUMIN SERPL ELPH-MCNC: 2.3 G/DL — LOW (ref 3.3–5)
ALBUMIN SERPL ELPH-MCNC: 2.5 G/DL — LOW (ref 3.3–5)
ALP SERPL-CCNC: 48 U/L — SIGNIFICANT CHANGE UP (ref 40–120)
ALP SERPL-CCNC: 48 U/L — SIGNIFICANT CHANGE UP (ref 40–120)
ALT FLD-CCNC: 44 U/L — SIGNIFICANT CHANGE UP (ref 12–78)
ALT FLD-CCNC: 46 U/L — SIGNIFICANT CHANGE UP (ref 12–78)
ANION GAP SERPL CALC-SCNC: 9 MMOL/L — SIGNIFICANT CHANGE UP (ref 5–17)
AST SERPL-CCNC: 105 U/L — HIGH (ref 15–37)
AST SERPL-CCNC: 109 U/L — HIGH (ref 15–37)
BASOPHILS # BLD AUTO: 0.01 K/UL — SIGNIFICANT CHANGE UP (ref 0–0.2)
BASOPHILS NFR BLD AUTO: 0.1 % — SIGNIFICANT CHANGE UP (ref 0–2)
BILIRUB DIRECT SERPL-MCNC: 0.1 MG/DL — SIGNIFICANT CHANGE UP (ref 0–0.3)
BILIRUB INDIRECT FLD-MCNC: 0.3 MG/DL — SIGNIFICANT CHANGE UP (ref 0.2–1)
BILIRUB SERPL-MCNC: 0.4 MG/DL — SIGNIFICANT CHANGE UP (ref 0.2–1.2)
BILIRUB SERPL-MCNC: 0.4 MG/DL — SIGNIFICANT CHANGE UP (ref 0.2–1.2)
BUN SERPL-MCNC: 30 MG/DL — HIGH (ref 7–23)
CALCIUM SERPL-MCNC: 8.3 MG/DL — LOW (ref 8.5–10.1)
CHLORIDE SERPL-SCNC: 105 MMOL/L — SIGNIFICANT CHANGE UP (ref 96–108)
CO2 SERPL-SCNC: 23 MMOL/L — SIGNIFICANT CHANGE UP (ref 22–31)
CREAT SERPL-MCNC: 1.1 MG/DL — SIGNIFICANT CHANGE UP (ref 0.5–1.3)
CREAT SERPL-MCNC: 1.1 MG/DL — SIGNIFICANT CHANGE UP (ref 0.5–1.3)
CULTURE RESULTS: NO GROWTH — SIGNIFICANT CHANGE UP
D DIMER BLD IA.RAPID-MCNC: 738 NG/ML DDU — HIGH
EOSINOPHIL # BLD AUTO: 0 K/UL — SIGNIFICANT CHANGE UP (ref 0–0.5)
EOSINOPHIL NFR BLD AUTO: 0 % — SIGNIFICANT CHANGE UP (ref 0–6)
FERRITIN SERPL-MCNC: 1410 NG/ML — HIGH (ref 30–400)
GLUCOSE SERPL-MCNC: 107 MG/DL — HIGH (ref 70–99)
HCT VFR BLD CALC: 40.2 % — SIGNIFICANT CHANGE UP (ref 39–50)
HGB BLD-MCNC: 13.6 G/DL — SIGNIFICANT CHANGE UP (ref 13–17)
IMM GRANULOCYTES NFR BLD AUTO: 0.6 % — SIGNIFICANT CHANGE UP (ref 0–1.5)
LYMPHOCYTES # BLD AUTO: 0.65 K/UL — LOW (ref 1–3.3)
LYMPHOCYTES # BLD AUTO: 8 % — LOW (ref 13–44)
MCHC RBC-ENTMCNC: 30.8 PG — SIGNIFICANT CHANGE UP (ref 27–34)
MCHC RBC-ENTMCNC: 33.8 GM/DL — SIGNIFICANT CHANGE UP (ref 32–36)
MCV RBC AUTO: 91 FL — SIGNIFICANT CHANGE UP (ref 80–100)
MONOCYTES # BLD AUTO: 0.83 K/UL — SIGNIFICANT CHANGE UP (ref 0–0.9)
MONOCYTES NFR BLD AUTO: 10.2 % — SIGNIFICANT CHANGE UP (ref 2–14)
NEUTROPHILS # BLD AUTO: 6.58 K/UL — SIGNIFICANT CHANGE UP (ref 1.8–7.4)
NEUTROPHILS NFR BLD AUTO: 81.1 % — HIGH (ref 43–77)
NRBC # BLD: 0 /100 WBCS — SIGNIFICANT CHANGE UP (ref 0–0)
PLATELET # BLD AUTO: 237 K/UL — SIGNIFICANT CHANGE UP (ref 150–400)
POTASSIUM SERPL-MCNC: 4.3 MMOL/L — SIGNIFICANT CHANGE UP (ref 3.5–5.3)
POTASSIUM SERPL-SCNC: 4.3 MMOL/L — SIGNIFICANT CHANGE UP (ref 3.5–5.3)
PROT SERPL-MCNC: 7.3 G/DL — SIGNIFICANT CHANGE UP (ref 6–8.3)
PROT SERPL-MCNC: 7.6 G/DL — SIGNIFICANT CHANGE UP (ref 6–8.3)
RBC # BLD: 4.42 M/UL — SIGNIFICANT CHANGE UP (ref 4.2–5.8)
RBC # FLD: 13.6 % — SIGNIFICANT CHANGE UP (ref 10.3–14.5)
SODIUM SERPL-SCNC: 137 MMOL/L — SIGNIFICANT CHANGE UP (ref 135–145)
SPECIMEN SOURCE: SIGNIFICANT CHANGE UP
T3 SERPL-MCNC: 55 NG/DL — LOW (ref 80–200)
TSH SERPL-MCNC: 1.08 UIU/ML — SIGNIFICANT CHANGE UP (ref 0.36–3.74)
WBC # BLD: 8.12 K/UL — SIGNIFICANT CHANGE UP (ref 3.8–10.5)
WBC # FLD AUTO: 8.12 K/UL — SIGNIFICANT CHANGE UP (ref 3.8–10.5)

## 2021-12-29 RX ORDER — APIXABAN 2.5 MG/1
5 TABLET, FILM COATED ORAL EVERY 12 HOURS
Refills: 0 | Status: DISCONTINUED | OUTPATIENT
Start: 2021-12-30 | End: 2022-01-07

## 2021-12-29 RX ORDER — HEPARIN SODIUM 5000 [USP'U]/ML
5000 INJECTION INTRAVENOUS; SUBCUTANEOUS EVERY 8 HOURS
Refills: 0 | Status: COMPLETED | OUTPATIENT
Start: 2021-12-29 | End: 2021-12-29

## 2021-12-29 RX ORDER — PIPERACILLIN AND TAZOBACTAM 4; .5 G/20ML; G/20ML
3.38 INJECTION, POWDER, LYOPHILIZED, FOR SOLUTION INTRAVENOUS EVERY 8 HOURS
Refills: 0 | Status: DISCONTINUED | OUTPATIENT
Start: 2021-12-29 | End: 2021-12-30

## 2021-12-29 RX ADMIN — Medication 88 MICROGRAM(S): at 07:10

## 2021-12-29 RX ADMIN — Medication 1000 MILLIGRAM(S): at 17:27

## 2021-12-29 RX ADMIN — ARIPIPRAZOLE 25 MILLIGRAM(S): 15 TABLET ORAL at 11:29

## 2021-12-29 RX ADMIN — REMDESIVIR 500 MILLIGRAM(S): 5 INJECTION INTRAVENOUS at 16:27

## 2021-12-29 RX ADMIN — SODIUM CHLORIDE 50 MILLILITER(S): 9 INJECTION INTRAMUSCULAR; INTRAVENOUS; SUBCUTANEOUS at 11:29

## 2021-12-29 RX ADMIN — Medication 25 MILLIGRAM(S): at 17:27

## 2021-12-29 RX ADMIN — Medication 2.5 MILLIGRAM(S): at 00:25

## 2021-12-29 RX ADMIN — Medication 1000 MILLIGRAM(S): at 05:52

## 2021-12-29 RX ADMIN — Medication 400 MILLIGRAM(S): at 17:27

## 2021-12-29 RX ADMIN — Medication 25 MILLIGRAM(S): at 05:52

## 2021-12-29 RX ADMIN — HEPARIN SODIUM 5000 UNIT(S): 5000 INJECTION INTRAVENOUS; SUBCUTANEOUS at 13:14

## 2021-12-29 RX ADMIN — Medication 2000 UNIT(S): at 05:52

## 2021-12-29 RX ADMIN — PIPERACILLIN AND TAZOBACTAM 25 GRAM(S): 4; .5 INJECTION, POWDER, LYOPHILIZED, FOR SOLUTION INTRAVENOUS at 21:40

## 2021-12-29 RX ADMIN — Medication 2000 UNIT(S): at 17:27

## 2021-12-29 RX ADMIN — DIVALPROEX SODIUM 750 MILLIGRAM(S): 500 TABLET, DELAYED RELEASE ORAL at 11:29

## 2021-12-29 RX ADMIN — Medication 100 MICROGRAM(S): at 07:10

## 2021-12-29 RX ADMIN — HEPARIN SODIUM 5000 UNIT(S): 5000 INJECTION INTRAVENOUS; SUBCUTANEOUS at 21:42

## 2021-12-29 RX ADMIN — PIPERACILLIN AND TAZOBACTAM 25 GRAM(S): 4; .5 INJECTION, POWDER, LYOPHILIZED, FOR SOLUTION INTRAVENOUS at 11:29

## 2021-12-29 RX ADMIN — Medication 400 MILLIGRAM(S): at 05:52

## 2021-12-29 RX ADMIN — PANTOPRAZOLE SODIUM 40 MILLIGRAM(S): 20 TABLET, DELAYED RELEASE ORAL at 07:55

## 2021-12-29 RX ADMIN — Medication 6 MILLIGRAM(S): at 05:51

## 2021-12-29 NOTE — PROGRESS NOTE ADULT - SUBJECTIVE AND OBJECTIVE BOX
CHAD DICKEY    PLV 2NOR 242 W1    Allergies    No Known Allergies    Intolerances        PAST MEDICAL & SURGICAL HISTORY:  Hypothyroid        FAMILY HISTORY:      Home Medications:  ARIPiprazole 10 mg oral tablet: 0.5 tab(s) orally once a day (in the morning)      *Takes along with 20mg for a TDD: 25mg * (2021 10:12)  ARIPiprazole 20 mg oral tablet: 1 tab(s) orally once a day (in the morning)    *Takes along with 5mg for a TDD: 25mg* (2021 10:12)  Calcium 600+D oral tablet: 1 tab(s) orally 2 times a day (2021 10:12)  carBAMazepine 400 mg oral tablet, extended release: 1 tab(s) orally 2 times a day (2021 10:12)  Cetaphil topical lotion: Apply topically to affected area 2 times a day (2021 10:12)  divalproex sodium 250 mg oral delayed release tablet: 1 tab(s) orally once a day (in the morning)    *Takes along with 500mg for a TDD in the morninmg  (2021 10:12)  divalproex sodium 500 mg oral delayed release tablet: 2 tab(s) orally 2 times a day at 3pm and 8pm  (2021 10:12)  Fish Oil 1000 mg oral capsule: 1 cap(s) orally once a day (in the morning) go back to usual home dose (15 Raymond 2021 19:47)  levothyroxine 100 mcg (0.1 mg) oral tablet: 1 tab(s) orally once a day     *Takes with 88mcg (1hr apart) for a TDD; 188mcg* follow home protocol (15 Raymond 2021 19:47)  levothyroxine 88 mcg (0.088 mg) oral tablet: 1 tab(s) orally once a day    *Takes with 100mcg (1hr apart) for a TDD; 188mcg * follow home protocol (15 Raymond 2021 19:47)  OLANZapine 20 mg oral tablet: 0.5 tab(s) orally once a day (in the evening) (2021 10:12)  oxybutynin 10 mg/24 hr oral tablet, extended release: 1 tab(s) orally once a day (2021 10:12)  tamsulosin 0.4 mg oral capsule: 1 cap(s) orally once a day (at bedtime) (2021 10:12)  Vitamin D3 1000 intl units (25 mcg) oral tablet:  (2021 10:12)      MEDICATIONS  (STANDING):  ARIPiprazole 25 milliGRAM(s) Oral daily  carBAMazepine ER Tablet 400 milliGRAM(s) Oral two times a day  cholecalciferol 2000 Unit(s) Oral two times a day  dexAMETHasone  Injectable 6 milliGRAM(s) IV Push daily  diVALproex  milliGRAM(s) Oral daily  levothyroxine 88 MICROGram(s) Oral daily  levothyroxine 100 MICROGram(s) Oral daily  metoprolol tartrate 25 milliGRAM(s) Oral two times a day  pantoprazole    Tablet 40 milliGRAM(s) Oral before breakfast  piperacillin/tazobactam IVPB.. 3.375 Gram(s) IV Intermittent every 12 hours  remdesivir  IVPB   IV Intermittent   remdesivir  IVPB 100 milliGRAM(s) IV Intermittent every 24 hours  sodium chloride 0.9%. 1000 milliLiter(s) (50 mL/Hr) IV Continuous <Continuous>  valproic acid 1000 milliGRAM(s) Oral two times a day    MEDICATIONS  (PRN):  acetaminophen     Tablet .. 650 milliGRAM(s) Oral every 6 hours PRN Temp greater or equal to 38C (100.4F)              Vital Signs Last 24 Hrs  T(C): 37.8 (29 Dec 2021 05:08), Max: 39.1 (28 Dec 2021 21:10)  T(F): 100 (29 Dec 2021 05:08), Max: 102.3 (28 Dec 2021 21:10)  HR: 81 (29 Dec 2021 05:08) (75 - 154)  BP: 113/79 (29 Dec 2021 05:08) (109/63 - 138/71)  BP(mean): --  RR: 91 (29 Dec 2021 05:08) (20 - 91)  SpO2: 92% (28 Dec 2021 21:47) (82% - 92%)      21 @ 07:01  -  21 @ 07:00  --------------------------------------------------------  IN: 720 mL / OUT: 700 mL / NET: 20 mL    21 @ 07:01  -  21 @ 06:06  --------------------------------------------------------  IN: 0 mL / OUT: 950 mL / NET: -950 mL              LABS:                        15.0   7.32  )-----------( 192      ( 28 Dec 2021 14:29 )             44.3         138  |  106  |  30<H>  ----------------------------<  157<H>  4.1   |  22  |  1.30    Ca    8.1<L>      28 Dec 2021 14:29  Phos  2.1         TPro  7.7  /  Alb  2.4<L>  /  TBili  0.4  /  DBili  x   /  AST  144<H>  /  ALT  53  /  AlkPhos  50      PT/INR - ( 27 Dec 2021 09:36 )   PT: 13.4 sec;   INR: 1.15 ratio           Urinalysis Basic - ( 27 Dec 2021 22:37 )    Color: Yellow / Appearance: Clear / S.015 / pH: x  Gluc: x / Ketone: Negative  / Bili: Negative / Urobili: Negative   Blood: x / Protein: 15 / Nitrite: Negative   Leuk Esterase: Negative / RBC: 0-2 /HPF / WBC 0-2   Sq Epi: x / Non Sq Epi: Occasional / Bacteria: Occasional            WBC:  WBC Count: 7.32 K/uL ( @ 14:29)  WBC Count: 7.36 K/uL ( @ 09:36)  WBC Count: 6.51 K/uL ( @ 20:50)      MICROBIOLOGY:  RECENT CULTURES:   .Blood Blood-Peripheral XXXX XXXX   No growth to date.          CARDIAC MARKERS ( 28 Dec 2021 22:45 )  x     / x     / 1319 U/L / x     / 3.4 ng/mL        PT/INR - ( 27 Dec 2021 09:36 )   PT: 13.4 sec;   INR: 1.15 ratio             Sodium:  Sodium, Serum: 138 mmol/L ( @ 14:29)  Sodium, Serum: 138 mmol/L ( @ 09:36)  Sodium, Serum: 137 mmol/L ( @ 20:50)      1.30 mg/dL  @ :29  1.20 mg/dL  @ 07:43  1.10 mg/dL  @ 09:36  1.40 mg/dL  @ 20:50      Hemoglobin:  Hemoglobin: 15.0 g/dL ( @ 14:29)  Hemoglobin: 15.2 g/dL ( @ 09:36)  Hemoglobin: 14.8 g/dL ( @ 20:50)      Platelets: Platelet Count - Automated: 192 K/uL ( @ 14:29)  Platelet Count - Automated: 170 K/uL ( @ 09:36)  Platelet Count - Automated: 172 K/uL ( @ 20:50)      LIVER FUNCTIONS - ( 28 Dec 2021 14:29 )  Alb: 2.4 g/dL / Pro: 7.7 g/dL / ALK PHOS: 50 U/L / ALT: 53 U/L / AST: 144 U/L / GGT: x             Urinalysis Basic - ( 27 Dec 2021 22:37 )    Color: Yellow / Appearance: Clear / S.015 / pH: x  Gluc: x / Ketone: Negative  / Bili: Negative / Urobili: Negative   Blood: x / Protein: 15 / Nitrite: Negative   Leuk Esterase: Negative / RBC: 0-2 /HPF / WBC 0-2   Sq Epi: x / Non Sq Epi: Occasional / Bacteria: Occasional        RADIOLOGY & ADDITIONAL STUDIES:      MICROBIOLOGY:  RECENT CULTURES:   .Blood Blood-Peripheral XXXX XXXX   No growth to date.

## 2021-12-29 NOTE — PHARMACOTHERAPY INTERVENTION NOTE - COMMENTS
Patient is a 61y M presenting with COVID-19. Patient is being treated empirically for bacterial co-infection with Zosyn 3.375g. Discussed with Dr. Yu, based on the patient's renal function (CrCl = 70 mL/min), recommend Zosyn 3.375g IV Q8. Accepted and order entered. Suggest following cultures with de-escalation as appropriate.    Becky Benedict, PharmD  Clinical Pharmacy Specialist [phone]

## 2021-12-29 NOTE — PROGRESS NOTE ADULT - SUBJECTIVE AND OBJECTIVE BOX
[facility] DIVISION of INFECTIOUS DISEASE  Rickey Guzman MD PhD, Tara Mcdonald MD, Calista Espinoza MD, Lou Tilley MD, John Moss MD  and providing coverage with Joy Markham MD and Derik Hooker MD  Providing Infectious Disease Consultations at [facility], [facility], [facility]'s      Office# [phone] to schedule follow up appointments  Answering Service for urgent calls or New Consults [phone]    Infectious Diseases Progress Note:    CHAD DICKEY is a 61y year old Male patient    COVID-19 Patient  Comfortable on NC.   Notes reviewed  Overnight events noted.   Allergies: No Known Allergies    ANTIBIOTICS/RELEVANT:  Antimicrobials piperacillin/tazobactam IVPB.. 3.375 Gram(s) IV Intermittent every 12 hours  remdesivir  IVPB   IV Intermittent   remdesivir  IVPB 100 milliGRAM(s) IV Intermittent every 24 hours    Immunologic:   Other Meds: acetaminophen     Tablet .. 650 milliGRAM(s) Oral every 6 hours PRN  ARIPiprazole 25 milliGRAM(s) Oral daily  carBAMazepine ER Tablet 400 milliGRAM(s) Oral two times a day  cholecalciferol 2000 Unit(s) Oral two times a day  dexAMETHasone  Injectable 6 milliGRAM(s) IV Push daily  diVALproex  milliGRAM(s) Oral daily  heparin   Injectable 5000 Unit(s) SubCutaneous every 8 hours  levothyroxine 88 MICROGram(s) Oral daily  levothyroxine 100 MICROGram(s) Oral daily  metoprolol tartrate 25 milliGRAM(s) Oral two times a day  pantoprazole    Tablet 40 milliGRAM(s) Oral before breakfast  sodium chloride 0.9%. 1000 milliLiter(s) IV Continuous <Continuous>  valproic acid 1000 milliGRAM(s) Oral two times a day    Objective:  Vital Signs Last 24 Hrs  T(F): 100 (29 Dec 2021 05:08), Max: 102.3 (28 Dec 2021 21:10)  HR: 80 (29 Dec 2021 07:39) (75 - 154)  BP: 113/79 (29 Dec 2021 05:08) (109/63 - 138/71)  RR: 90 (29 Dec 2021 07:39) (20 - 91)  SpO2: 92% (28 Dec 2021 21:47) (81% - 92%)  PHYSICAL EXAM:  HEENT: NC/AT, anicteric, supple  Respiratory: no acc muscle use, breathing comfortably  Cardiovascular: S1 S2 present, normal rate  Gastrointestinal: normal appearing, nondistended  Extremities: no edema, no cyanosis    LABS:                        13.6   8.12  )-----------( 237      ( 29 Dec 2021 11:25 )             40.2     WBC trend:  8.12  @ 11:25  7.32  @ 14:29  7.36  @ 09:36  6.51  @ 20:50        137  |  105  |  30<H>  ----------------------------<  107<H>  4.3   |  23  |  1.10    Ca    8.3<L>      29 Dec 2021 11:25    TPro  7.6  /  Alb  2.5<L>  /  TBili  0.4  /  DBili  x   /  AST  109<H>  /  ALT  46  /  AlkPhos  48      Creatinine, Serum: 1.10 mg/dL (21 @ 11:25)  Creatinine, Serum: 1.10 mg/dL (21 @ 08:55)  Creatinine, Serum: 1.30 mg/dL (21 @ 14:29)  Creatinine, Serum: 1.20 mg/dL (21 @ 07:43)  Creatinine, Serum: 1.10 mg/dL (21 @ 09:36)  Creatinine, Serum: 1.40 mg/dL (21 @ 20:50)    Urinalysis Basic - ( 27 Dec 2021 22:37 )  Color: Yellow / Appearance: Clear / S.015 / pH: x  Gluc: x / Ketone: Negative  / Bili: Negative / Urobili: Negative   Blood: x / Protein: 15 / Nitrite: Negative   Leuk Esterase: Negative / RBC: 0-2 /HPF / WBC 0-2   Sq Epi: x / Non Sq Epi: Occasional / Bacteria: Occasional    Auto Neutrophil #: 6.58 K/uL (21 @ 11:25)  Auto Neutrophil #: 6.23 K/uL (21 @ 14:29)  Auto Neutrophil #: 6.37 K/uL (21 @ 09:36)  Auto Neutrophil #: 4.51 K/uL (21 @ 20:50)    Auto Lymphocyte #: 0.65 K/uL (21 @ 11:25)  Auto Lymphocyte #: 0.61 K/uL (21 @ 14:29)  Auto Lymphocyte #: 0.51 K/uL (21 @ 09:36)  Auto Lymphocyte #: 0.99 K/uL (21 @ 20:50)    Procalcitonin, Serum: 0.49 ng/mL (21 @ 07:43)  Procalcitonin, Serum: 0.28 ng/mL (21 @ 09:36)    Ferritin, Serum: 1410 ng/mL (21 @ 00:19)  Ferritin, Serum: 1173 ng/mL (21 @ 12:24)  Ferritin, Serum: 1024 ng/mL (21 @ 12:15)    D-Dimer Assay, Quantitative: 738 ng/mL DDU (21 @ 22:45)  D-Dimer Assay, Quantitative: 217 ng/mL DDU (21 @ 09:36)    Creatine Kinase, Serum: 1319 U/L (21 @ 22:45)    Lactate, Blood: 2.0 mmol/L (21 @ 09:37)    Prothrombin Time, Plasma: 13.4 sec (21 @ 09:36)    C-Reactive Protein, Serum: 199 mg/L (21 @ 02:42)    MICROBIOLOGY: reviewed  Culture - Blood (collected 21 @ 00:33)  Source: .Blood Blood-Peripheral  Preliminary Report (21 @ 01:02):    No growth to date.    Culture - Blood (collected 21 @ 00:33)  Source: .Blood Blood-Peripheral  Preliminary Report (21 @ 01:02):    No growth to date.    RADIOLOGY & ADDITIONAL STUDIES: reviewed

## 2021-12-29 NOTE — PROGRESS NOTE ADULT - ASSESSMENT
Patient is a 60 year old male with PMH of bipolar disorder, intellectual developmental disability, anxiety, hypothyroidism, HLD, BPH, eczema who was diagnosed with COVID 1 week ago and was brought in for fever, cough, weakness and body aches and admitted for hypoxia due to COVID pneumonia.   +COVID on admission, negative flu  CXR reviewed, b/l infiltrates  CT chest imaging reviewed, c/w COVID  Vaccinated with Pfizer x2  NLR on admission - 4.51/0.99= 4.6    Recommendations:  12/27 - afebrile, on NC 1.5L, NLR -6.37/0.51 = 12.5. Started on dexamethasone and remdesivir. Started on pip-tazo for possible bacterial pna, will check for ferritin/pct, if ratio >1000 then would d/c pip-tazo. On heparin for vte ppx. Prone as tolerated. Monitor CBC with diff, inflammatory markers.    12/28 - febrile last night likely d/t COVID, remains on NC, continue remdesivir x5d, dexamethasone x10d. FPR ~3600 - low suspicion for superimposed bacterial pneumonia. UA negative. Follow blood cultures - if remains negative x48h - discontinue pip-tazo. Supplemental O2 as needed, wean as tolerated. Continue supportive care. Monitor temps, CBC, LFTs   12/29 - NLR 6.58/0.65= 10, on NC 6L, fevers likely due to COVID. Continue remdesivir, dexamethasone. FPR remains >1000. Bcx NGTD x2 - if remains negative x48h, can d/c pip-tazo. Continue supportive care. Monitor CBC, inflam markers, LFTs.

## 2021-12-29 NOTE — CONSULT NOTE ADULT - SUBJECTIVE AND OBJECTIVE BOX
CHAD NGUYEN APER 08    Allergies    No Known Allergies    Intolerances        PAST MEDICAL & SURGICAL HISTORY:  Hypothyroid        FAMILY HISTORY:      Home Medications:  ARIPiprazole 10 mg oral tablet: 0.5 tab(s) orally once a day (in the morning)      *Takes along with 20mg for a TDD: 25mg * (2021 10:12)  ARIPiprazole 20 mg oral tablet: 1 tab(s) orally once a day (in the morning)    *Takes along with 5mg for a TDD: 25mg* (2021 10:12)  Calcium 600+D oral tablet: 1 tab(s) orally 2 times a day (2021 10:12)  carBAMazepine 400 mg oral tablet, extended release: 1 tab(s) orally 2 times a day (2021 10:12)  Cetaphil topical lotion: Apply topically to affected area 2 times a day (2021 10:12)  divalproex sodium 250 mg oral delayed release tablet: 1 tab(s) orally once a day (in the morning)    *Takes along with 500mg for a TDD in the morninmg  (2021 10:12)  divalproex sodium 500 mg oral delayed release tablet: 2 tab(s) orally 2 times a day at 3pm and 8pm  (2021 10:12)  Fish Oil 1000 mg oral capsule: 1 cap(s) orally once a day (in the morning) go back to usual home dose (15 Raymond 2021 19:47)  levothyroxine 100 mcg (0.1 mg) oral tablet: 1 tab(s) orally once a day     *Takes with 88mcg (1hr apart) for a TDD; 188mcg* follow home protocol (15 Raymond 2021 19:47)  levothyroxine 88 mcg (0.088 mg) oral tablet: 1 tab(s) orally once a day    *Takes with 100mcg (1hr apart) for a TDD; 188mcg * follow home protocol (15 Raymond 2021 19:47)  OLANZapine 20 mg oral tablet: 0.5 tab(s) orally once a day (in the evening) (2021 10:12)  oxybutynin 10 mg/24 hr oral tablet, extended release: 1 tab(s) orally once a day (2021 10:12)  tamsulosin 0.4 mg oral capsule: 1 cap(s) orally once a day (at bedtime) (2021 10:12)  Vitamin D3 1000 intl units (25 mcg) oral tablet:  (2021 10:12)      MEDICATIONS  (STANDING):  ARIPiprazole 25 milliGRAM(s) Oral daily  carBAMazepine ER Tablet 400 milliGRAM(s) Oral two times a day  cholecalciferol 2000 Unit(s) Oral two times a day  dexAMETHasone  Injectable 6 milliGRAM(s) IV Push daily  diVALproex DR 1000 milliGRAM(s) Oral two times a day  heparin   Injectable 5000 Unit(s) SubCutaneous every 12 hours  levothyroxine 88 MICROGram(s) Oral daily  pantoprazole    Tablet 40 milliGRAM(s) Oral before breakfast  piperacillin/tazobactam IVPB. 3.375 Gram(s) IV Intermittent once  piperacillin/tazobactam IVPB.. 3.375 Gram(s) IV Intermittent every 12 hours  sodium chloride 0.9%. 1000 milliLiter(s) (40 mL/Hr) IV Continuous <Continuous>    MEDICATIONS  (PRN):      Diet, Pureed:   Moderately Thick Liquids (MODTHICKLIQS) (21 @ 22:42) [Active]          Vital Signs Last 24 Hrs  T(C): 37.9 (26 Dec 2021 18:08), Max: 37.9 (26 Dec 2021 18:08)  T(F): 100.2 (26 Dec 2021 18:08), Max: 100.2 (26 Dec 2021 18:08)  HR: 100 (26 Dec 2021 18:08) (100 - 100)  BP: 138/68 (26 Dec 2021 18:08) (138/68 - 138/68)  BP(mean): --  RR: 18 (26 Dec 2021 18:08) (18 - 18)  SpO2: 92% (26 Dec 2021 18:08) (92% - 92%)              LABS:                        14.8   6.51  )-----------( 172      ( 26 Dec 2021 20:50 )             44.1         137  |  102  |  24<H>  ----------------------------<  83  4.2   |  25  |  1.40<H>    Ca    8.8      26 Dec 2021 20:50    TPro  8.3  /  Alb  3.1<L>  /  TBili  0.4  /  DBili  x   /  AST  149<H>  /  ALT  39  /  AlkPhos  53                WBC:  WBC Count: 6.51 K/uL ( @ 20:50)      MICROBIOLOGY:  RECENT CULTURES:                  Sodium:  Sodium, Serum: 137 mmol/L ( @ 20:50)      1.40 mg/dL  @ 20:50      Hemoglobin:  Hemoglobin: 14.8 g/dL ( @ 20:50)      Platelets: Platelet Count - Automated: 172 K/uL ( @ 20:50)      LIVER FUNCTIONS - ( 26 Dec 2021 20:50 )  Alb: 3.1 g/dL / Pro: 8.3 g/dL / ALK PHOS: 53 U/L / ALT: 39 U/L / AST: 149 U/L / GGT: x                 RADIOLOGY & ADDITIONAL STUDIES:      MICROBIOLOGY:  RECENT CULTURES:          
[facility], Division of Infectious Diseases  MARIO Morales, CARSON Clifton G. Casimir  [phone]  ([phone] - weekdays after 5pm and weekends)    CHAD DICKEY  61y, Male  [id number]    HPI:  Patient is a 60 year old male with PMH of bipolar disorder, intellectual developmental disability, anxiety, hypothyroidism, HLD, BPH, eczema who was diagnosed with COVID 1 week ago and was brought in for fever, cough, weakness and body aches. Patient lives in group home but was visiting mother. Patient is a poor historian, history obtained from chart. Patient COVID vaccinated with Pfizer x2, last dose in May. Patient with low grade fever in ER, 100.2F and saturating 89% on room air.   ROS: limited due to above     Allergies: No Known Allergies    PMH -- bipolar disorder, intellectual developmental disability, anxiety, hypothyroidism, HLD, BPH, eczema   PSH -- no known surgeries  FH -- noncontributory  Social History -- denies tobacco, alcohol or illicit drug use, lives in group home    Physical Exam--  Vital Signs Last 24 Hrs  T(F): 98.4 (27 Dec 2021 04:30), Max: 100.2 (26 Dec 2021 18:08)  HR: 67 (27 Dec 2021 04:30) (67 - 100)  BP: 127/73 (27 Dec 2021 04:30) (127/73 - 148/87)  RR: 18 (27 Dec 2021 04:30) (18 - 18)  SpO2: 92% (27 Dec 2021 04:30) (91% - 96%)  PHYSICAL EXAM:  General: no acute distress, NC  HEENT: NC/AT, anicteric, supple  Respiratory: decreased breath sounds  Cardiovascular: S1 S2 present, normal rate  Gastrointestinal: soft, nontender, nondistended  Extremities: no edema, no cyanosis  Skin: no visible rash    Laboratory & Imaging Data--  CBC:                       15.2   7.36  )-----------( 170      ( 27 Dec 2021 09:36 )             44.9     WBC Count: 7.36 K/uL (12-27-21 @ 09:36)  WBC Count: 6.51 K/uL (12-26-21 @ 20:50)    CMP: 12-27    138  |  108  |  23  ----------------------------<  157<H>  4.2   |  21<L>  |  1.10    Ca    8.5      27 Dec 2021 09:36  Phos  2.1     12-27    TPro  8.4<H>  /  Alb  2.9<L>  /  TBili  0.5  /  DBili  0.1  /  AST  154<H>  /  ALT  43  /  AlkPhos  52  12-27    LIVER FUNCTIONS - ( 27 Dec 2021 09:36 )  Alb: 2.9 g/dL / Pro: 8.4 g/dL / ALK PHOS: 52 U/L / ALT: 43 U/L / AST: 154 U/L / GGT: x           Auto Neutrophil #: 6.37 K/uL (12-27-21 @ 09:36)  Auto Neutrophil #: 4.51 K/uL (12-26-21 @ 20:50)    Auto Lymphocyte #: 0.51 K/uL (12-27-21 @ 09:36)  Auto Lymphocyte #: 0.99 K/uL (12-26-21 @ 20:50)    Procalcitonin, Serum: 0.28 ng/mL (12-27-21 @ 09:36)    D-Dimer Assay, Quantitative: 217 ng/mL DDU (12-27-21 @ 09:36)    Lactate, Blood: 2.0 mmol/L (12-27-21 @ 09:37)    Prothrombin Time, Plasma: 13.4 sec (12-27-21 @ 09:36)    Microbiology:   12/26 - Flu with COVID by YURIY - +SARS-CoV-2    Radiology--  US Duplex Venous Lower Ext Complete, Bilateral (12.27.21 @ 00:31) >IMPRESSION: No deep vein thrombosis in either lower extremity.    Xray Chest 1 View-PORTABLE IMMEDIATE (12.26.21 @ 19:40) >IMPRESSION: Bilateral infiltrates left greater than right.    Active Medications--  ARIPiprazole 25 milliGRAM(s) Oral dailcarBAMazepine ER Tablet 400 milliGRAM(s) Oral two times a day  cholecalciferol 2000 Unit(s) Oral two times a day  dexAMETHasone  Injectable 6 milliGRAM(s) IV Push daily  diVALproex  milliGRAM(s) Oral daily  heparin   Injectable 5000 Unit(s) SubCutaneous every 12 hours  levothyroxine 88 MICROGram(s) Oral daily  levothyroxine 100 MICROGram(s) Oral daily  pantoprazole    Tablet 40 milliGRAM(s) Oral before breakfast  piperacillin/tazobactam IVPB.. 3.375 Gram(s) IV Intermittent every 12 hours  sodium chloride 0.9%. 1000 milliLiter(s) IV Continuous <Continuous>  valproic acid 1000 milliGRAM(s) Oral two times a day    Antimicrobials:   piperacillin/tazobactam IVPB.. 3.375 Gram(s) IV Intermittent every 12 hours    Immunologic:   
[facility]    CHIEF COMPLAINT: Patient is a 61y old  Male who presents with a chief complaint of low oxygen with covid 1 week now (29 Dec 2021 06:06)      HPI:    PAST MEDICAL & SURGICAL HISTORY:  Hypothyroid      SOCIAL HISTORY: no tobacco  FAMILY HISTORY:   HTN  MEDICATIONS  (STANDING):  ARIPiprazole 25 milliGRAM(s) Oral daily  carBAMazepine ER Tablet 400 milliGRAM(s) Oral two times a day  cholecalciferol 2000 Unit(s) Oral two times a day  dexAMETHasone  Injectable 6 milliGRAM(s) IV Push daily  diVALproex  milliGRAM(s) Oral daily  heparin   Injectable 5000 Unit(s) SubCutaneous every 8 hours  levothyroxine 88 MICROGram(s) Oral daily  levothyroxine 100 MICROGram(s) Oral daily  metoprolol tartrate 25 milliGRAM(s) Oral two times a day  pantoprazole    Tablet 40 milliGRAM(s) Oral before breakfast  piperacillin/tazobactam IVPB.. 3.375 Gram(s) IV Intermittent every 12 hours  remdesivir  IVPB   IV Intermittent   remdesivir  IVPB 100 milliGRAM(s) IV Intermittent every 24 hours  sodium chloride 0.9%. 1000 milliLiter(s) (50 mL/Hr) IV Continuous <Continuous>  valproic acid 1000 milliGRAM(s) Oral two times a day    MEDICATIONS  (PRN):  acetaminophen     Tablet .. 650 milliGRAM(s) Oral every 6 hours PRN Temp greater or equal to 38C (100.4F)    Allergies    No Known Allergies    Intolerances    REVIEW OF SYSTEMS:  CONSTITUTIONAL: weakness,    fevers   EYES/ENT: No visual changes  NECK: No pain or stiffness  RESPIRATORY: shortness of breath  CARDIOVASCULAR: No chest pain or palpitations  GASTROINTESTINAL: No abdominal pain  GENITOURINARY: No hematuria  NEUROLOGICAL: No weakness  SKIN: No rash  All other review of systems is negative unless indicated above    VITAL SIGNS:   Vital Signs Last 24 Hrs  T(C): 37.8 (29 Dec 2021 05:08), Max: 39.1 (28 Dec 2021 21:10)  T(F): 100 (29 Dec 2021 05:08), Max: 102.3 (28 Dec 2021 21:10)  HR: 80 (29 Dec 2021 07:39) (75 - 154)  BP: 113/79 (29 Dec 2021 05:08) (109/63 - 138/71)  BP(mean): --  RR: 90 (29 Dec 2021 07:39) (20 - 91)  SpO2: 92% (28 Dec 2021 21:47) (81% - 92%)  I&O's Summary    28 Dec 2021 07:01  -  29 Dec 2021 07:00  --------------------------------------------------------  IN: 0 mL / OUT: 950 mL / NET: -950 mL      PHYSICAL EXAM:  Constitutional: NAD  Neurological: Alert and oriented  HEENT: EOMI, no JVD  Cardiovascular: S1 and S2, no murmur  Pulmonary: breath sounds bilaterally  wheezing  Gastrointestinal: Bowel Sounds present, soft, nontender  Ext: no peripheral edema  Skin: No rashes, No cyanosis.  Psych:  Mood calm    LABS: All Labs Reviewed:                        15.0   7.32  )-----------( 192      ( 28 Dec 2021 14:29 )             44.3     12-28    138  |  106  |  30<H>  ----------------------------<  157<H>  4.1   |  22  |  1.30    Ca    8.1<L>      28 Dec 2021 14:29  Phos  2.1     12-27    TPro  7.7  /  Alb  2.4<L>  /  TBili  0.4  /  DBili  x   /  AST  144<H>  /  ALT  53  /  AlkPhos  50  12-28    PT/INR - ( 27 Dec 2021 09:36 )   PT: 13.4 sec;   INR: 1.15 ratio           CARDIAC MARKERS ( 28 Dec 2021 22:45 )  x     / x     / 1319 U/L / x     / 3.4 ng/mL      12 Lead ECG:   Ventricular Rate 93 BPM    Atrial Rate 93 BPM    P-R Interval 138 ms    QRS Duration 84 ms    Q-T Interval 326 ms    QTC Calculation(Bazett) 405 ms    P Axis 38 degrees    R Axis 11 degrees    T Axis 31 degrees    Diagnosis Line Normal sinus rhythm  Possible Left atrial enlargement    Confirmed by sriram Molina ([id number]) on 12/27/2021 12:52:39 PM (12-26-21 @ 20:29)    61 m hypothyroid a/w COVID PNA  s/p IV ABX, antivirals  BP/HR stable on low dose BB  Will follow  cont heparin, abx

## 2021-12-29 NOTE — PROGRESS NOTE ADULT - ASSESSMENT
NOBLE BONILLA 60 M [facility] P 12/26/2021 1958 DR GALEN ROSA     REVIEW OF SYMPTOMS      Able to give ROS  Yes     RELIABLE +/-   CONSTITUTIONAL Weakness Yes  Chills No   ENDOCRINE  No heat or cold intolerance    ALLERGY No hives  Sore throat No stridor  RESP Coughing blood no  Shortness of breath YES   NEURO No Headache  Confusion Pain neck No   CARDIAC No Chest pain No Palpitations   GI  Pain abdomen NO   Vomiting NO     PHYSICAL EXAM    HEENT Unremarkable  atraumatic   RESP Fair air entry EXP prolonged    Harsh breath sound Resp distres mild   CARDIAC S1 S2 No S3     NO JVD    ABDOMEN SOFT BS PRESENT NOT DISTENDED No hepatosplenomegaly PEDAL EDEMA present No calf tenderness  NO rash     ____  DOA CC.  12/26/2021 COVID diagn 12/13  Fever cough  _____                            HOSPITAL COURSE.  COVID poa 12/26/2021   DEXA 12/26  RDVZ 12/27/2021    BACT INFECTN   12/26/2021 zosyn    HYPOTHYROID pmh  NEURO PROBLEMS pmh  12/26/2021 aripiprazole 25  12/26/2021 carbamazepine 400.2   12/26/2021 depakot 1000.2        _____                            GOC. 12/26/2021 Full code   COVID STATUS. 12/26/2021 scv2 (+)   ICU STAY. none    PMH.  PMH GERD   PMH BPH   PMH BIPOLAR  PMH MILD INTELLECTUAL DISABILITY     GENERAL ISSUES                                       ALLERGY.   nka                         WEIGHT.     12/26/2021 94                               BMI.                12/26/2021 32   ABIO.   12/26/2021 zosyn Dr Menendez   12/27/2021 Remedna Tilley     12/26/2021 Dexa      PATIENT DATA   VITALS/PO/IO/VENT/DRIPS.   12/29/2021 afeb 80 110/70   12/29/2021 6l 91%     ASSESSMENT/RECOMMENDATIONS.    RESP.   Monitor po Target po 90-95%    HEMODYNAMICS.   Monitor bp Target MAP 65 (+)    HYTN  12/28 metoprolol 25.2     OXYGENATION STATUS.   12/29/2021 6l 91%     RO VTE.  D-dimr 12/28 d-di 738  V duplx 12/27/2021 v duplx (-)    INFECTION.   Surveillance for bacterial infection.  W 12/26-12/28/2021 w 6.5 - 7.3   pr 12/27-11/28/2021 pr .28 - 0.49  ua 12/27 w 0-2   Fluab 12/26/2021 (-)  rsv 12/26/2021 (-)   blod c 12/28 (-)  urine c 12/28 (-)  Zosyn 12/26/2021 Zosyn Dr Menendez   ID 12/27/2021 Seen by Dr Jarek Zhang is that if Ferritin/pct level over 1000 to dc zosyn     COVID.  Scv2 12/26/2021 (+)  Ferrn 12/27-11/28/2021 ferrn 1024 - 1173   Cr 12/26-12/27-12/28/2021 Cr 1.4 - 1.1-1.3  LFTS 12/26/2021  AP. 149  AST. 39  ALT. 53  ct 12/27/2021 ct ch periph bl opac   remdesevir 12/27/2021 remdesev  Dexa 12/26/2021 dexa 6 x 10d  a/r Monitor inflammatory markers  Monitor thrombotic markers   Monitor oxygenation  12/27/2021 ID started maxi    TIME SPENT   Over 25 minutes aggregate care time spent on encounter; activities included   direct patient care, counseling and/or coordinating care reviewing notes, lab data/ imaging , discussion with multidisciplinary team/ patient  /family and explaining in detail risks, benefits, alternatives  of the recommendations     NOBLE BONILLA 60 M [facility] P 12/26/2021 1958 DR GALEN ROSA

## 2021-12-29 NOTE — PROGRESS NOTE ADULT - PROBLEM SELECTOR PLAN 1
over night tachycardia  A flutter  seen by cardiologist  on bb and heparin sq  alert interactive  still 6 liters 02  cr improved

## 2021-12-29 NOTE — PROGRESS NOTE ADULT - SUBJECTIVE AND OBJECTIVE BOX
PAST MEDICAL & SURGICAL HISTORY:  Hypothyroid        MEDICATIONS  (STANDING):  ARIPiprazole 25 milliGRAM(s) Oral daily  carBAMazepine ER Tablet 400 milliGRAM(s) Oral two times a day  cholecalciferol 2000 Unit(s) Oral two times a day  dexAMETHasone  Injectable 6 milliGRAM(s) IV Push daily  diVALproex  milliGRAM(s) Oral daily  heparin   Injectable 5000 Unit(s) SubCutaneous every 8 hours  levothyroxine 88 MICROGram(s) Oral daily  levothyroxine 100 MICROGram(s) Oral daily  metoprolol tartrate 25 milliGRAM(s) Oral two times a day  pantoprazole    Tablet 40 milliGRAM(s) Oral before breakfast  piperacillin/tazobactam IVPB.. 3.375 Gram(s) IV Intermittent every 8 hours  remdesivir  IVPB   IV Intermittent   remdesivir  IVPB 100 milliGRAM(s) IV Intermittent every 24 hours  sodium chloride 0.9%. 1000 milliLiter(s) (50 mL/Hr) IV Continuous <Continuous>  valproic acid 1000 milliGRAM(s) Oral two times a day    MEDICATIONS  (PRN):  acetaminophen     Tablet .. 650 milliGRAM(s) Oral every 6 hours PRN Temp greater or equal to 38C (100.4F)      Patient is a 61y old  Male who presents with a chief complaint of low oxygen with covid 1 week now (29 Dec 2021 12:09)      Vital Signs Last 24 Hrs  T(C): 37.1 (29 Dec 2021 13:58), Max: 39.1 (28 Dec 2021 21:10)  T(F): 98.8 (29 Dec 2021 13:58), Max: 102.3 (28 Dec 2021 21:10)  HR: 82 (29 Dec 2021 13:58) (75 - 154)  BP: 109/68 (29 Dec 2021 13:58) (109/63 - 138/71)  BP(mean): --  RR: 91 (29 Dec 2021 13:58) (20 - 91)  SpO2: 92% (28 Dec 2021 21:47) (81% - 92%)           @ 07:01  -   @ 07:00  --------------------------------------------------------  IN: 0 mL / OUT: 950 mL / NET: -950 mL        REVIEW OF SYSTEMS:    Constitutional: No fever, weight loss or fatigue  Eyes: No eye pain, visual disturbances, or discharge  ENT:  No difficulty hearing, tinnitus, vertigo; No sinus or throat pain  Neck: No pain or stiffness  Breasts: No pain, masses or nipple discharge  Respiratory: No cough, wheezing, chills or hemoptysis  Cardiovascular: No chest pain, palpitations, shortness of breath, dizziness or leg swelling  Gastrointestinal: No abdominal or epigastric pain. No nausea, vomiting or hematemesis; No diarrhea or constipation. No melena or hematochezia.  Genitourinary: No dysuria, frequency, hematuria or incontinence  Rectal: No pain, hemorrhoids or incontinence  Neurological: No headaches, memory loss, loss of strength, numbness or tremors  Skin: No itching, burning, rashes or lesions   Lymph Nodes: No enlarged glands  Endocrine: No heat or cold intolerance; No hair loss  Musculoskeletal: No joint pain or swelling; No muscle, back or extremity pain  Psychiatric: No depression, anxiety, mood swings or difficulty sleeping  Heme/Lymph: No easy bruising or bleeding gums  Allergy and Immunologic: No hives or eczema    PHYSICAL EXAM:  very alert  interactive  ambulating  Constitutional: NAD,   Respiratory: Clear upper lobes   Cardiovascular: reg  Gastrointestinal: BS+, soft, NT/ND  Extremities: No peripheral edema  Vascular: 2+ peripheral pulses  able to carry full conversation  Skin: No rashes      decubiti: none                          13.6   8.12  )-----------( 237      ( 29 Dec 2021 11:25 )             40.2         137  |  105  |  30<H>  ----------------------------<  107<H>  4.3   |  23  |  1.10    Ca    8.3<L>      29 Dec 2021 11:25    TPro  7.6  /  Alb  2.5<L>  /  TBili  0.4  /  DBili  x   /  AST  109<H>  /  ALT  46  /  AlkPhos  48        Urinalysis Basic - ( 27 Dec 2021 22:37 )    Color: Yellow / Appearance: Clear / S.015 / pH: x  Gluc: x / Ketone: Negative  / Bili: Negative / Urobili: Negative   Blood: x / Protein: 15 / Nitrite: Negative   Leuk Esterase: Negative / RBC: 0-2 /HPF / WBC 0-2   Sq Epi: x / Non Sq Epi: Occasional / Bacteria: Occasional      CARDIAC MARKERS ( 28 Dec 2021 22:45 )  x     / x     / 1319 U/L / x     / 3.4 ng/mL       @ 22:45        CPK:  0.3    Clean Catch Clean Catch (Midstream)   @ 05:32   No growth  --  --      .Blood Blood-Peripheral   @ 00:33   No growth to date.  --  --        Urine Culture:   @ 05:32    --        No growth  Urine Culture:   @ 00:33    --        No growth to date.        Radiology:

## 2021-12-30 LAB
ALBUMIN SERPL ELPH-MCNC: 2.2 G/DL — LOW (ref 3.3–5)
ALBUMIN SERPL ELPH-MCNC: 2.4 G/DL — LOW (ref 3.3–5)
ALP SERPL-CCNC: 48 U/L — SIGNIFICANT CHANGE UP (ref 40–120)
ALP SERPL-CCNC: 51 U/L — SIGNIFICANT CHANGE UP (ref 40–120)
ALT FLD-CCNC: 37 U/L — SIGNIFICANT CHANGE UP (ref 12–78)
ALT FLD-CCNC: 39 U/L — SIGNIFICANT CHANGE UP (ref 12–78)
ANION GAP SERPL CALC-SCNC: 7 MMOL/L — SIGNIFICANT CHANGE UP (ref 5–17)
AST SERPL-CCNC: 84 U/L — HIGH (ref 15–37)
AST SERPL-CCNC: 85 U/L — HIGH (ref 15–37)
BASOPHILS # BLD AUTO: 0 K/UL — SIGNIFICANT CHANGE UP (ref 0–0.2)
BASOPHILS NFR BLD AUTO: 0 % — SIGNIFICANT CHANGE UP (ref 0–2)
BILIRUB DIRECT SERPL-MCNC: 0.1 MG/DL — SIGNIFICANT CHANGE UP (ref 0–0.3)
BILIRUB INDIRECT FLD-MCNC: 0.3 MG/DL — SIGNIFICANT CHANGE UP (ref 0.2–1)
BILIRUB SERPL-MCNC: 0.4 MG/DL — SIGNIFICANT CHANGE UP (ref 0.2–1.2)
BILIRUB SERPL-MCNC: 0.4 MG/DL — SIGNIFICANT CHANGE UP (ref 0.2–1.2)
BUN SERPL-MCNC: 22 MG/DL — SIGNIFICANT CHANGE UP (ref 7–23)
CALCIUM SERPL-MCNC: 8.1 MG/DL — LOW (ref 8.5–10.1)
CHLORIDE SERPL-SCNC: 109 MMOL/L — HIGH (ref 96–108)
CO2 SERPL-SCNC: 24 MMOL/L — SIGNIFICANT CHANGE UP (ref 22–31)
CREAT SERPL-MCNC: 0.99 MG/DL — SIGNIFICANT CHANGE UP (ref 0.5–1.3)
CREAT SERPL-MCNC: 1 MG/DL — SIGNIFICANT CHANGE UP (ref 0.5–1.3)
EOSINOPHIL # BLD AUTO: 0 K/UL — SIGNIFICANT CHANGE UP (ref 0–0.5)
EOSINOPHIL NFR BLD AUTO: 0 % — SIGNIFICANT CHANGE UP (ref 0–6)
GLUCOSE SERPL-MCNC: 131 MG/DL — HIGH (ref 70–99)
HCT VFR BLD CALC: 37.8 % — LOW (ref 39–50)
HGB BLD-MCNC: 12.6 G/DL — LOW (ref 13–17)
LYMPHOCYTES # BLD AUTO: 0.44 K/UL — LOW (ref 1–3.3)
LYMPHOCYTES # BLD AUTO: 7 % — LOW (ref 13–44)
MANUAL SMEAR VERIFICATION: SIGNIFICANT CHANGE UP
MCHC RBC-ENTMCNC: 30.7 PG — SIGNIFICANT CHANGE UP (ref 27–34)
MCHC RBC-ENTMCNC: 33.3 GM/DL — SIGNIFICANT CHANGE UP (ref 32–36)
MCV RBC AUTO: 92 FL — SIGNIFICANT CHANGE UP (ref 80–100)
MONOCYTES # BLD AUTO: 0.69 K/UL — SIGNIFICANT CHANGE UP (ref 0–0.9)
MONOCYTES NFR BLD AUTO: 11 % — SIGNIFICANT CHANGE UP (ref 2–14)
NEUTROPHILS # BLD AUTO: 5.16 K/UL — SIGNIFICANT CHANGE UP (ref 1.8–7.4)
NEUTROPHILS NFR BLD AUTO: 79 % — HIGH (ref 43–77)
NEUTS BAND # BLD: 3 % — SIGNIFICANT CHANGE UP (ref 0–8)
NRBC # BLD: 0 — SIGNIFICANT CHANGE UP
NRBC # BLD: SIGNIFICANT CHANGE UP /100 WBCS (ref 0–0)
PLAT MORPH BLD: NORMAL — SIGNIFICANT CHANGE UP
PLATELET # BLD AUTO: 230 K/UL — SIGNIFICANT CHANGE UP (ref 150–400)
POTASSIUM SERPL-MCNC: 4.3 MMOL/L — SIGNIFICANT CHANGE UP (ref 3.5–5.3)
POTASSIUM SERPL-SCNC: 4.3 MMOL/L — SIGNIFICANT CHANGE UP (ref 3.5–5.3)
PROT SERPL-MCNC: 6.9 G/DL — SIGNIFICANT CHANGE UP (ref 6–8.3)
PROT SERPL-MCNC: 7.2 G/DL — SIGNIFICANT CHANGE UP (ref 6–8.3)
RBC # BLD: 4.11 M/UL — LOW (ref 4.2–5.8)
RBC # FLD: 13.6 % — SIGNIFICANT CHANGE UP (ref 10.3–14.5)
RBC BLD AUTO: NORMAL — SIGNIFICANT CHANGE UP
SARS-COV-2 RNA SPEC QL NAA+PROBE: DETECTED
SODIUM SERPL-SCNC: 140 MMOL/L — SIGNIFICANT CHANGE UP (ref 135–145)
WBC # BLD: 6.29 K/UL — SIGNIFICANT CHANGE UP (ref 3.8–10.5)
WBC # FLD AUTO: 6.29 K/UL — SIGNIFICANT CHANGE UP (ref 3.8–10.5)

## 2021-12-30 RX ADMIN — REMDESIVIR 500 MILLIGRAM(S): 5 INJECTION INTRAVENOUS at 18:40

## 2021-12-30 RX ADMIN — Medication 25 MILLIGRAM(S): at 06:21

## 2021-12-30 RX ADMIN — PIPERACILLIN AND TAZOBACTAM 25 GRAM(S): 4; .5 INJECTION, POWDER, LYOPHILIZED, FOR SOLUTION INTRAVENOUS at 06:23

## 2021-12-30 RX ADMIN — SODIUM CHLORIDE 50 MILLILITER(S): 9 INJECTION INTRAMUSCULAR; INTRAVENOUS; SUBCUTANEOUS at 13:21

## 2021-12-30 RX ADMIN — APIXABAN 5 MILLIGRAM(S): 2.5 TABLET, FILM COATED ORAL at 06:38

## 2021-12-30 RX ADMIN — Medication 1000 MILLIGRAM(S): at 18:30

## 2021-12-30 RX ADMIN — DIVALPROEX SODIUM 750 MILLIGRAM(S): 500 TABLET, DELAYED RELEASE ORAL at 13:13

## 2021-12-30 RX ADMIN — Medication 400 MILLIGRAM(S): at 18:30

## 2021-12-30 RX ADMIN — Medication 2000 UNIT(S): at 18:30

## 2021-12-30 RX ADMIN — Medication 6 MILLIGRAM(S): at 06:24

## 2021-12-30 RX ADMIN — Medication 1000 MILLIGRAM(S): at 06:20

## 2021-12-30 RX ADMIN — ARIPIPRAZOLE 25 MILLIGRAM(S): 15 TABLET ORAL at 13:13

## 2021-12-30 RX ADMIN — PANTOPRAZOLE SODIUM 40 MILLIGRAM(S): 20 TABLET, DELAYED RELEASE ORAL at 06:20

## 2021-12-30 RX ADMIN — Medication 100 MICROGRAM(S): at 06:20

## 2021-12-30 RX ADMIN — Medication 88 MICROGRAM(S): at 06:20

## 2021-12-30 RX ADMIN — Medication 400 MILLIGRAM(S): at 06:21

## 2021-12-30 RX ADMIN — Medication 2000 UNIT(S): at 06:21

## 2021-12-30 RX ADMIN — APIXABAN 5 MILLIGRAM(S): 2.5 TABLET, FILM COATED ORAL at 18:30

## 2021-12-30 RX ADMIN — Medication 25 MILLIGRAM(S): at 18:30

## 2021-12-30 NOTE — PROGRESS NOTE ADULT - ASSESSMENT
NOBLE BONILLA 60 M [facility] P 12/26/2021 1958 DR GALEN ROSA     REVIEW OF SYMPTOMS      Able to give ROS  Yes     RELIABLE +/-   CONSTITUTIONAL Weakness Yes  Chills No   ENDOCRINE  No heat or cold intolerance    ALLERGY No hives  Sore throat No stridor  RESP Coughing blood no  Shortness of breath YES   NEURO No Headache  Confusion Pain neck No   CARDIAC No Chest pain No Palpitations   GI  Pain abdomen NO   Vomiting NO     PHYSICAL EXAM    HEENT Unremarkable  atraumatic   RESP Fair air entry EXP prolonged    Harsh breath sound Resp distres mild   CARDIAC S1 S2 No S3     NO JVD    ABDOMEN SOFT BS PRESENT NOT DISTENDED No hepatosplenomegaly PEDAL EDEMA present No calf tenderness  NO rash     ____  DOA CC.  12/26/2021 COVID diagn 12/13  Fever cough  _____                            HOSPITAL COURSE.  COVID poa 12/26/2021   DEXA 12/26  RDVZ 12/27/2021    BACT INFECTN   12/26/2021 zosyn    HYPOTHYROID pmh  NEURO PROBLEMS pmh  12/26/2021 aripiprazole 25  12/26/2021 carbamazepine 400.2   12/26/2021 depakot 1000.2     PATIENT DATA   VITALS/PO/IO/VENT/DRIPS.   12/30/2021 afeb 80 117/60   12/30/2021 6l 90%      ASSESSMENT/RECOMMENDATIONS.    RESP.   Monitor po Target po 90-95%    HEMODYNAMICS.   Monitor bp Target MAP 65 (+)    HYTN  12/28 metoprolol 25.2     OXYGENATION STATUS.   12/30/2021 6l 90%    RO VTE.  D-dimr 12/28 d-di 738  V duplx 12/27/2021 v duplx (-)    INFECTION.   Surveillance for bacterial infection.  W 12/26-12/28/2021 w 6.5 - 7.3   pr 12/27-11/28/2021 pr .28 - 0.49  ua 12/27 w 0-2   Fluab 12/26/2021 (-)  rsv 12/26/2021 (-)   blod c 12/28 (-)  urine c 12/28 (-)  12/30/2021 zosyn dced    COVID.  Scv2 12/26/2021 (+)  Ferrn 12/27-11/28/2021 ferrn 1024 - 1173   Cr 12/26-12/27-12/28/2021 Cr 1.4 - 1.1-1.3  LFTS 12/26/2021  AP. 149  AST. 39  ALT. 53  ct 12/27/2021 ct ch periph bl opac   remdesevir 12/27/2021 remdesev  Dexa 12/26/2021 dexa 6 x 10d  a/r Monitor inflammatory markers  Monitor thrombotic markers   Monitor oxygenation  12/27/2021 ID started remdes    TIME SPENT   Over 25 minutes aggregate care time spent on encounter; activities included   direct patient care, counseling and/or coordinating care reviewing notes, lab data/ imaging , discussion with multidisciplinary team/ patient  /family and explaining in detail risks, benefits, alternatives  of the recommendations     NOBLE BONILLA 60 M [facility] P 12/26/2021 1958 DR GALEN ROSA

## 2021-12-30 NOTE — PROGRESS NOTE ADULT - SUBJECTIVE AND OBJECTIVE BOX
PAST MEDICAL & SURGICAL HISTORY:  Hypothyroid        MEDICATIONS  (STANDING):  apixaban 5 milliGRAM(s) Oral every 12 hours  ARIPiprazole 25 milliGRAM(s) Oral daily  carBAMazepine ER Tablet 400 milliGRAM(s) Oral two times a day  cholecalciferol 2000 Unit(s) Oral two times a day  dexAMETHasone  Injectable 6 milliGRAM(s) IV Push daily  diVALproex  milliGRAM(s) Oral daily  levothyroxine 88 MICROGram(s) Oral daily  levothyroxine 100 MICROGram(s) Oral daily  metoprolol tartrate 25 milliGRAM(s) Oral two times a day  pantoprazole    Tablet 40 milliGRAM(s) Oral before breakfast  remdesivir  IVPB   IV Intermittent   remdesivir  IVPB 100 milliGRAM(s) IV Intermittent every 24 hours  valproic acid 1000 milliGRAM(s) Oral two times a day    MEDICATIONS  (PRN):  acetaminophen     Tablet .. 650 milliGRAM(s) Oral every 6 hours PRN Temp greater or equal to 38C (100.4F)      Patient is a 61y old  Male who presents with a chief complaint of low oxygen with covid 1 week now (30 Dec 2021 11:29)      Vital Signs Last 24 Hrs  T(C): 36.9 (30 Dec 2021 12:32), Max: 37.7 (29 Dec 2021 20:30)  T(F): 98.5 (30 Dec 2021 12:32), Max: 99.8 (29 Dec 2021 20:30)  HR: 77 (30 Dec 2021 12:32) (77 - 98)  BP: 117/66 (30 Dec 2021 12:32) (112/66 - 152/60)  BP(mean): --  RR: 17 (30 Dec 2021 12:32) (17 - 19)  SpO2: 90% (30 Dec 2021 12:32) (90% - 91%)          12-29 @ 07:01  -  12-30 @ 07:00  --------------------------------------------------------  IN: 750 mL / OUT: 0 mL / NET: 750 mL    12-30 @ 07:01  -  12-30 @ 15:34  --------------------------------------------------------  IN: 1000 mL / OUT: 700 mL / NET: 300 mL        REVIEW OF SYSTEMS:    Constitutional: No fever, weight loss or fatigue  Eyes: No eye pain, visual disturbances, or discharge  ENT:  No difficulty hearing, tinnitus, vertigo; No sinus or throat pain  Neck: No pain or stiffness  Breasts: No pain, masses or nipple discharge  Respiratory: No cough, wheezing, chills or hemoptysis  Cardiovascular: No chest pain, palpitations, shortness of breath, dizziness or leg swelling  Gastrointestinal: No abdominal or epigastric pain. No nausea, vomiting or hematemesis; No diarrhea or constipation. No melena or hematochezia.  Genitourinary: No dysuria, frequency, hematuria or incontinence  Rectal: No pain, hemorrhoids or incontinence  Neurological: No headaches, memory loss, loss of strength, numbness or tremors  Skin: No itching, burning, rashes or lesions   Lymph Nodes: No enlarged glands  Endocrine: No heat or cold intolerance; No hair loss  Musculoskeletal: No joint pain or swelling; No muscle, back or extremity pain  Psychiatric: No depression, anxiety, mood swings or difficulty sleeping  Heme/Lymph: No easy bruising or bleeding gums  Allergy and Immunologic: No hives or eczema    PHYSICAL EXAM:    Constitutional: NAD, well-groomed, well-developed  HEENT: PERRLA, EOMI, Normal Hearing, MMM  Neck: No LAD, No JVD  Back: Normal spine flexure, No CVA tenderness  Respiratory: CTAB/L   Cardiovascular: S1 and S2, RRR, no M/G/R  Gastrointestinal: BS+, soft, NT/ND  Extremities: No peripheral edema  Vascular: 2+ peripheral pulses  Neurological: A/O x 3, no focal deficits  Skin: No rashes      decubiti:                           12.6   6.29  )-----------( 230      ( 30 Dec 2021 09:56 )             37.8     12-30    140  |  109<H>  |  22  ----------------------------<  131<H>  4.3   |  24  |  1.00    Ca    8.1<L>      30 Dec 2021 09:56    TPro  7.2  /  Alb  2.4<L>  /  TBili  0.4  /  DBili  x   /  AST  85<H>  /  ALT  39  /  AlkPhos  51  12-30        CARDIAC MARKERS ( 28 Dec 2021 22:45 )  x     / x     / 1319 U/L / x     / 3.4 ng/mL        Clean Catch Clean Catch (Midstream)  12-28 @ 05:32   No growth  --  --      .Blood Blood-Peripheral  12-28 @ 00:33   No growth to date.  --  --        Urine Culture:  12-28 @ 05:32    --        No growth  Urine Culture:  12-28 @ 00:33    --        No growth to date.        Radiology:

## 2021-12-30 NOTE — PROGRESS NOTE ADULT - SUBJECTIVE AND OBJECTIVE BOX
[facility] DIVISION of INFECTIOUS DISEASE  Rickey Guzman MD PhD, Tara Mcdonald MD, Calista Espinoza MD, Lou Tilley MD, John Moss MD  and providing coverage with Joy Markham MD and Derik Hooker MD  Providing Infectious Disease Consultations at [facility], [facility], [facility]'s      Office# [phone] to schedule follow up appointments  Answering Service for urgent calls or New Consults [phone]    Infectious Diseases Progress Note:    CHAD DICKEY is a 61y year old Male patient    COVID-19 Patient  Patient on NC 6L, afebrile.   Notes reviewed  No concerning events overnight.   Allergies: No Known Allergies    ANTIBIOTICS/RELEVANT:  Antimicrobials piperacillin/tazobactam IVPB.. 3.375 Gram(s) IV Intermittent every 8 hours  remdesivir  IVPB   IV Intermittent   remdesivir  IVPB 100 milliGRAM(s) IV Intermittent every 24 hours    Immunologic:   Other Meds: acetaminophen     Tablet .. 650 milliGRAM(s) Oral every 6 hours PRN  apixaban 5 milliGRAM(s) Oral every 12 hours  ARIPiprazole 25 milliGRAM(s) Oral daily  carBAMazepine ER Tablet 400 milliGRAM(s) Oral two times a day  cholecalciferol 2000 Unit(s) Oral two times a day  dexAMETHasone  Injectable 6 milliGRAM(s) IV Push daily  diVALproex  milliGRAM(s) Oral daily  levothyroxine 88 MICROGram(s) Oral daily  levothyroxine 100 MICROGram(s) Oral daily  metoprolol tartrate 25 milliGRAM(s) Oral two times a day  pantoprazole    Tablet 40 milliGRAM(s) Oral before breakfast  sodium chloride 0.9%. 1000 milliLiter(s) IV Continuous <Continuous>  valproic acid 1000 milliGRAM(s) Oral two times a day    Objective:  Vital Signs Last 24 Hrs  T(F): 99.3 (30 Dec 2021 05:35), Max: 99.8 (29 Dec 2021 20:30)  HR: 83 (30 Dec 2021 05:35) (77 - 98)  BP: 152/60 (30 Dec 2021 05:35) (109/68 - 152/60)  RR: 19 (30 Dec 2021 05:35) (19 - 91)  SpO2: 91% (30 Dec 2021 05:35) (90% - 91%)  PHYSICAL EXAM:  HEENT: NC/AT, anicteric, supple  Respiratory: no acc muscle use, breathing comfortably  Cardiovascular: S1 S2 present, normal rate  Gastrointestinal: normal appearing, nondistended  Extremities: no edema, no cyanosis    LABS:                        12.6   6.29  )-----------( 230      ( 30 Dec 2021 09:56 )             37.8     WBC trend:  6.29 12-30 @ 09:56  8.12 12-29 @ 11:25  7.32 12-28 @ 14:29  7.36 12-27 @ 09:36  6.51 12-26 @ 20:50    12-30    x   |  x   |  x   ----------------------------<  x   x    |  x   |  0.99    Ca    8.3<L>      29 Dec 2021 11:25    TPro  6.9  /  Alb  2.2<L>  /  TBili  0.4  /  DBili  0.1  /  AST  84<H>  /  ALT  37  /  AlkPhos  48  12-30    Creatinine, Serum: 0.99 mg/dL (12-30-21 @ 06:59)  Creatinine, Serum: 1.10 mg/dL (12-29-21 @ 11:25)  Creatinine, Serum: 1.10 mg/dL (12-29-21 @ 08:55)  Creatinine, Serum: 1.30 mg/dL (12-28-21 @ 14:29)  Creatinine, Serum: 1.20 mg/dL (12-28-21 @ 07:43)  Creatinine, Serum: 1.10 mg/dL (12-27-21 @ 09:36)  Creatinine, Serum: 1.40 mg/dL (12-26-21 @ 20:50)    Auto Neutrophil #: 6.58 K/uL (12-29-21 @ 11:25)  Auto Neutrophil #: 6.23 K/uL (12-28-21 @ 14:29)  Auto Neutrophil #: 6.37 K/uL (12-27-21 @ 09:36)  Auto Neutrophil #: 4.51 K/uL (12-26-21 @ 20:50)    Auto Lymphocyte #: 0.65 K/uL (12-29-21 @ 11:25)  Auto Lymphocyte #: 0.61 K/uL (12-28-21 @ 14:29)  Auto Lymphocyte #: 0.51 K/uL (12-27-21 @ 09:36)  Auto Lymphocyte #: 0.99 K/uL (12-26-21 @ 20:50)    Procalcitonin, Serum: 0.49 ng/mL (12-28-21 @ 07:43)  Procalcitonin, Serum: 0.28 ng/mL (12-27-21 @ 09:36)    Ferritin, Serum: 1410 ng/mL (12-29-21 @ 00:19)  Ferritin, Serum: 1173 ng/mL (12-28-21 @ 12:24)  Ferritin, Serum: 1024 ng/mL (12-27-21 @ 12:15)    D-Dimer Assay, Quantitative: 738 ng/mL DDU (12-28-21 @ 22:45)  D-Dimer Assay, Quantitative: 217 ng/mL DDU (12-27-21 @ 09:36)    Creatine Kinase, Serum: 1319 U/L (12-28-21 @ 22:45)    Lactate, Blood: 2.0 mmol/L (12-27-21 @ 09:37)    Prothrombin Time, Plasma: 13.4 sec (12-27-21 @ 09:36)    C-Reactive Protein, Serum: 199 mg/L (12-27-21 @ 02:42)    MICROBIOLOGY: reviewed  Culture - Urine (collected 12-28-21 @ 05:32)  Source: Clean Catch Clean Catch (Midstream)  Final Report (12-29-21 @ 13:41):    No growth    Culture - Blood (collected 12-28-21 @ 00:33)  Source: .Blood Blood-Peripheral  Preliminary Report (12-29-21 @ 01:02):    No growth to date.    Culture - Blood (collected 12-28-21 @ 00:33)  Source: .Blood Blood-Peripheral  Preliminary Report (12-29-21 @ 01:02):    No growth to date.    RADIOLOGY & ADDITIONAL STUDIES: reviewed

## 2021-12-30 NOTE — PROGRESS NOTE ADULT - ASSESSMENT
Patient is a 60 year old male with PMH of bipolar disorder, intellectual developmental disability, anxiety, hypothyroidism, HLD, BPH, eczema who was diagnosed with COVID 1 week ago and was brought in for fever, cough, weakness and body aches and admitted for hypoxia due to COVID pneumonia.   +COVID on admission, negative flu  CXR reviewed, b/l infiltrates  CT chest imaging reviewed, c/w COVID  Vaccinated with Pfizer x2  NLR on admission - 4.51/0.99= 4.6    Recommendations:  12/27 - afebrile, on NC 1.5L, NLR -6.37/0.51 = 12.5. Started on dexamethasone and remdesivir. Started on pip-tazo for possible bacterial pna, will check for ferritin/pct, if ratio >1000 then would d/c pip-tazo. On heparin for vte ppx. Prone as tolerated. Monitor CBC with diff, inflammatory markers.    12/28 - febrile last night likely d/t COVID, remains on NC, continue remdesivir x5d, dexamethasone x10d. FPR ~3600 - low suspicion for superimposed bacterial pneumonia. UA negative. Follow blood cultures - if remains negative x48h - discontinue pip-tazo. Supplemental O2 as needed, wean as tolerated.    12/29 - NLR 6.58/0.65= 10, on NC 6L, fevers likely due to COVID. Continue remdesivir, dexamethasone. FPR remains >1000. Bcx NGTD x2 - if remains negative x48h, can d/c pip-tazo.   12/30 - on NC 6L, fever curve better Tm 100F in last 24h. Continue remdesivir until 12/31. Continue dexamethasone x10d. Blood and urine cultures negative - will discontinue pip-tazo. Continue supportive care. Monitor CBC, inflammatory markers, LFTs.     Dr. Espinoza will be covering 12/31-1/2, please feel free to call [phone] with any questions.

## 2021-12-30 NOTE — PROGRESS NOTE ADULT - SUBJECTIVE AND OBJECTIVE BOX
[facility]    CHIEF COMPLAINT: Patient is a 61y old  Male who presents with a chief complaint of low oxygen with covid 1 week now (30 Dec 2021 10:10)      Follow Up: [ ] Chest Pain      [ ] Dyspnea     [ ] Palpitations    [ ] Atrial Fibrillation     [ ] Ventricular Dysrhythmia    [ ] Abnormal EKG                      [ ] Abnormal Cardiac Enzymes     [ ] Valvular Disease    HPI:    PAST MEDICAL & SURGICAL HISTORY:  Hypothyroid      MEDICATIONS  (STANDING):  apixaban 5 milliGRAM(s) Oral every 12 hours  ARIPiprazole 25 milliGRAM(s) Oral daily  carBAMazepine ER Tablet 400 milliGRAM(s) Oral two times a day  cholecalciferol 2000 Unit(s) Oral two times a day  dexAMETHasone  Injectable 6 milliGRAM(s) IV Push daily  diVALproex  milliGRAM(s) Oral daily  levothyroxine 88 MICROGram(s) Oral daily  levothyroxine 100 MICROGram(s) Oral daily  metoprolol tartrate 25 milliGRAM(s) Oral two times a day  pantoprazole    Tablet 40 milliGRAM(s) Oral before breakfast  remdesivir  IVPB   IV Intermittent   remdesivir  IVPB 100 milliGRAM(s) IV Intermittent every 24 hours  sodium chloride 0.9%. 1000 milliLiter(s) (50 mL/Hr) IV Continuous <Continuous>  valproic acid 1000 milliGRAM(s) Oral two times a day    MEDICATIONS  (PRN):  acetaminophen     Tablet .. 650 milliGRAM(s) Oral every 6 hours PRN Temp greater or equal to 38C (100.4F)    Allergies    No Known Allergies    Intolerances        REVIEW OF SYSTEMS:    CONSTITUTIONAL: No weakness, fevers or chills.   EYES/ENT: No visual changes;    NECK: No pain or stiffness  RESPIRATORY: No cough, wheezing, No shortness of breath  CARDIOVASCULAR: No chest pain or palpitations  GASTROINTESTINAL: No abdominal pain, or hematochezia.  GENITOURINARY: No dysuria orhematuria  NEUROLOGICAL: No numbness or weakness  SKIN: No itching, burning, rashes  All other review of systems is negative unless indicated above    Vital Signs Last 24 Hrs  T(C): 37.4 (30 Dec 2021 05:35), Max: 37.7 (29 Dec 2021 20:30)  T(F): 99.3 (30 Dec 2021 05:35), Max: 99.8 (29 Dec 2021 20:30)  HR: 83 (30 Dec 2021 05:35) (77 - 98)  BP: 152/60 (30 Dec 2021 05:35) (109/68 - 152/60)  BP(mean): --  RR: 19 (30 Dec 2021 05:35) (19 - 91)  SpO2: 91% (30 Dec 2021 05:35) (90% - 91%)  I&O's Summary    29 Dec 2021 07:01  -  30 Dec 2021 07:00  --------------------------------------------------------  IN: 750 mL / OUT: 0 mL / NET: 750 mL        PHYSICAL EXAM:  Constitutional: NAD  Neurological: Alert, no focal deficits  HEENT: no JVD, EOMI  Cardiovascular: Regular, S1 and S2, no murmur  Pulmonary: breath sounds bilaterally  Gastrointestinal: Bowel Sounds present, soft, nontender  EXT:  no peripheral edema  Skin: No rashes.  Psych:  Mood calm  LABS: All Labs Reviewed:                          12.6   6.29  )-----------( 230      ( 30 Dec 2021 09:56 )             37.8     12-30    140  |  109<H>  |  22  ----------------------------<  131<H>  4.3   |  24  |  1.00    Ca    8.1<L>      30 Dec 2021 09:56    TPro  7.2  /  Alb  2.4<L>  /  TBili  0.4  /  DBili  x   /  AST  85<H>  /  ALT  39  /  AlkPhos  51  12-30      CARDIAC MARKERS ( 28 Dec 2021 22:45 )  x     / x     / 1319 U/L / x     / 3.4 ng/mL      12 Lead ECG:   Ventricular Rate 146 BPM    Atrial Rate 326 BPM    QRS Duration 82 ms    Q-T Interval 236 ms    QTC Calculation(Bazett) 367 ms    P Axis 132 degrees    R Axis 9 degrees    T Axis 30 degrees    Diagnosis Line Atrial flutter with variable AV block  Nonspecific ST abnormality  Confirmed by CHARY MCGINNIS ([id number]) on 12/29/2021 10:41:10 AM (12-28-21 @ 22:11)  12 Lead ECG:   Ventricular Rate 151 BPM    Atrial Rate 315 BPM    QRS Duration 80 ms    Q-T Interval 276 ms    QTC Calculation(Bazett) 437 ms    P Axis 29 degrees    R Axis 178 degrees    T Axis 174 degrees    Diagnosis Line Atrial flutter with variable AV block  Lateral infarct (cited on or before 28-DEC-2021)  Inferior infarct (cited on or before 28-DEC-2021)  Confirmed by CHARY MCGINNIS ([id number]) on 12/29/2021 10:40:59 AM (12-28-21 @ 22:08)  12 Lead ECG:   Ventricular Rate 147 BPM    Atrial Rate 312 BPM    QRS Duration 78 ms    Q-T Interval 270 ms    QTC Calculation(Bazett) 422 ms    P Axis 54 degrees    R Axis 175 degrees    T Axis 168 degrees    Diagnosis Line Atrial flutter with variable AV block  Lateral infarct , age undetermined  Inferior infarct , age undetermined  Confirmed by CHARY MCGINNIS ([id number]) on 12/29/2021 10:40:49 AM (12-28-21 @ 22:06)    61 m hypothyroid a/w COVID PNA  s/p IV ABX, antivirals  s/p heparin, abx    	pt had Atrial flutter on admission, converted to NSR tues last night. s/p heparin. now on Eliquis 5 BID    BP/HR stable on low dose BB  Will follow prn

## 2021-12-30 NOTE — PROGRESS NOTE ADULT - SUBJECTIVE AND OBJECTIVE BOX
CHAD DICKEY    PLV 2NOR 242 W1    Allergies    No Known Allergies    Intolerances        PAST MEDICAL & SURGICAL HISTORY:  Hypothyroid        FAMILY HISTORY:      Home Medications:  ARIPiprazole 10 mg oral tablet: 0.5 tab(s) orally once a day (in the morning)      *Takes along with 20mg for a TDD: 25mg * (2021 10:12)  ARIPiprazole 20 mg oral tablet: 1 tab(s) orally once a day (in the morning)    *Takes along with 5mg for a TDD: 25mg* (2021 10:12)  Calcium 600+D oral tablet: 1 tab(s) orally 2 times a day (2021 10:12)  carBAMazepine 400 mg oral tablet, extended release: 1 tab(s) orally 2 times a day (2021 10:12)  Cetaphil topical lotion: Apply topically to affected area 2 times a day (2021 10:12)  divalproex sodium 250 mg oral delayed release tablet: 1 tab(s) orally once a day (in the morning)    *Takes along with 500mg for a TDD in the morninmg  (2021 10:12)  divalproex sodium 500 mg oral delayed release tablet: 2 tab(s) orally 2 times a day at 3pm and 8pm  (2021 10:12)  Fish Oil 1000 mg oral capsule: 1 cap(s) orally once a day (in the morning) go back to usual home dose (15 Raymond 2021 19:47)  levothyroxine 100 mcg (0.1 mg) oral tablet: 1 tab(s) orally once a day     *Takes with 88mcg (1hr apart) for a TDD; 188mcg* follow home protocol (15 Raymond 2021 19:47)  levothyroxine 88 mcg (0.088 mg) oral tablet: 1 tab(s) orally once a day    *Takes with 100mcg (1hr apart) for a TDD; 188mcg * follow home protocol (15 Raymond 2021 19:47)  OLANZapine 20 mg oral tablet: 0.5 tab(s) orally once a day (in the evening) (2021 10:12)  oxybutynin 10 mg/24 hr oral tablet, extended release: 1 tab(s) orally once a day (2021 10:12)  tamsulosin 0.4 mg oral capsule: 1 cap(s) orally once a day (at bedtime) (2021 10:12)  Vitamin D3 1000 intl units (25 mcg) oral tablet:  (2021 10:12)      MEDICATIONS  (STANDING):  apixaban 5 milliGRAM(s) Oral every 12 hours  ARIPiprazole 25 milliGRAM(s) Oral daily  carBAMazepine ER Tablet 400 milliGRAM(s) Oral two times a day  cholecalciferol 2000 Unit(s) Oral two times a day  dexAMETHasone  Injectable 6 milliGRAM(s) IV Push daily  diVALproex  milliGRAM(s) Oral daily  levothyroxine 88 MICROGram(s) Oral daily  levothyroxine 100 MICROGram(s) Oral daily  metoprolol tartrate 25 milliGRAM(s) Oral two times a day  pantoprazole    Tablet 40 milliGRAM(s) Oral before breakfast  piperacillin/tazobactam IVPB.. 3.375 Gram(s) IV Intermittent every 8 hours  remdesivir  IVPB   IV Intermittent   remdesivir  IVPB 100 milliGRAM(s) IV Intermittent every 24 hours  sodium chloride 0.9%. 1000 milliLiter(s) (50 mL/Hr) IV Continuous <Continuous>  valproic acid 1000 milliGRAM(s) Oral two times a day    MEDICATIONS  (PRN):  acetaminophen     Tablet .. 650 milliGRAM(s) Oral every 6 hours PRN Temp greater or equal to 38C (100.4F)              Vital Signs Last 24 Hrs  T(C): 37.4 (30 Dec 2021 05:35), Max: 37.7 (29 Dec 2021 20:30)  T(F): 99.3 (30 Dec 2021 05:35), Max: 99.8 (29 Dec 2021 20:30)  HR: 83 (30 Dec 2021 05:35) (77 - 98)  BP: 152/60 (30 Dec 2021 05:35) (109/68 - 152/60)  BP(mean): --  RR: 19 (30 Dec 2021 05:35) (19 - 91)  SpO2: 91% (30 Dec 2021 05:35) (90% - 91%)      21 @ 07:01  -  21 @ 07:00  --------------------------------------------------------  IN: 750 mL / OUT: 0 mL / NET: 750 mL              LABS:                        13.6   8.12  )-----------( 237      ( 29 Dec 2021 11:25 )             40.2         x   |  x   |  x   ----------------------------<  x   x    |  x   |  0.99    Ca    8.3<L>      29 Dec 2021 11:25    TPro  6.9  /  Alb  2.2<L>  /  TBili  0.4  /  DBili  0.1  /  AST  84<H>  /  ALT  37  /  AlkPhos  48                WBC:  WBC Count: 8.12 K/uL ( @ 11:25)  WBC Count: 7.32 K/uL ( @ 14:29)  WBC Count: 7.36 K/uL ( @ 09:36)  WBC Count: 6.51 K/uL ( @ 20:50)      MICROBIOLOGY:  RECENT CULTURES:   Clean Catch Clean Catch (Midstream) XXXX XXXX   No growth     .Blood Blood-Peripheral XXXX XXXX   No growth to date.          CARDIAC MARKERS ( 28 Dec 2021 22:45 )  x     / x     / 1319 U/L / x     / 3.4 ng/mL            Sodium:  Sodium, Serum: 137 mmol/L ( @ 11:25)  Sodium, Serum: 138 mmol/L ( @ 14:29)  Sodium, Serum: 138 mmol/L ( @ 09:36)  Sodium, Serum: 137 mmol/L ( @ 20:50)      0.99 mg/dL  @ 06:59  1.10 mg/dL  @ 11:25  1.10 mg/dL  @ 08:55  1.30 mg/dL  @ 14:29  1.20 mg/dL  @ 07:43  1.10 mg/dL  @ 09:36  1.40 mg/dL  @ 20:50      Hemoglobin:  Hemoglobin: 13.6 g/dL ( @ 11:25)  Hemoglobin: 15.0 g/dL ( @ 14:29)  Hemoglobin: 15.2 g/dL ( @ 09:36)  Hemoglobin: 14.8 g/dL ( @ 20:50)      Platelets: Platelet Count - Automated: 237 K/uL ( @ 11:25)  Platelet Count - Automated: 192 K/uL ( @ 14:29)  Platelet Count - Automated: 170 K/uL ( @ 09:36)  Platelet Count - Automated: 172 K/uL ( @ 20:50)      LIVER FUNCTIONS - ( 30 Dec 2021 06:59 )  Alb: 2.2 g/dL / Pro: 6.9 g/dL / ALK PHOS: 48 U/L / ALT: 37 U/L / AST: 84 U/L / GGT: x                 RADIOLOGY & ADDITIONAL STUDIES:      MICROBIOLOGY:  RECENT CULTURES:   Clean Catch Clean Catch (Midstream) XXXX XXXX   No growth     .Blood Blood-Peripheral XXXX XXXX   No growth to date.

## 2021-12-31 RX ADMIN — Medication 25 MILLIGRAM(S): at 05:58

## 2021-12-31 RX ADMIN — DIVALPROEX SODIUM 750 MILLIGRAM(S): 500 TABLET, DELAYED RELEASE ORAL at 12:22

## 2021-12-31 RX ADMIN — Medication 6 MILLIGRAM(S): at 05:57

## 2021-12-31 RX ADMIN — Medication 100 MICROGRAM(S): at 05:58

## 2021-12-31 RX ADMIN — Medication 25 MILLIGRAM(S): at 19:47

## 2021-12-31 RX ADMIN — PANTOPRAZOLE SODIUM 40 MILLIGRAM(S): 20 TABLET, DELAYED RELEASE ORAL at 05:58

## 2021-12-31 RX ADMIN — APIXABAN 5 MILLIGRAM(S): 2.5 TABLET, FILM COATED ORAL at 05:57

## 2021-12-31 RX ADMIN — REMDESIVIR 500 MILLIGRAM(S): 5 INJECTION INTRAVENOUS at 17:00

## 2021-12-31 RX ADMIN — APIXABAN 5 MILLIGRAM(S): 2.5 TABLET, FILM COATED ORAL at 19:47

## 2021-12-31 RX ADMIN — ARIPIPRAZOLE 25 MILLIGRAM(S): 15 TABLET ORAL at 12:22

## 2021-12-31 RX ADMIN — Medication 2000 UNIT(S): at 05:58

## 2021-12-31 RX ADMIN — Medication 1000 MILLIGRAM(S): at 19:47

## 2021-12-31 RX ADMIN — Medication 88 MICROGRAM(S): at 05:58

## 2021-12-31 RX ADMIN — Medication 400 MILLIGRAM(S): at 05:58

## 2021-12-31 RX ADMIN — Medication 2000 UNIT(S): at 19:47

## 2021-12-31 RX ADMIN — Medication 400 MILLIGRAM(S): at 19:47

## 2021-12-31 RX ADMIN — Medication 1000 MILLIGRAM(S): at 05:57

## 2021-12-31 NOTE — PROGRESS NOTE ADULT - SUBJECTIVE AND OBJECTIVE BOX
[facility], Division of Infectious Diseases  MARIO Morales Y. Patel, S. Shah, G. [facility]  [phone]    Name: CHAD DICKEY  Age: 61y  Gender: Male  MRN: [id number]    Interval History:  Patient seen and examined at bedside this morning  No acute overnight events.   Notes reviewed    Antibiotics:  remdesivir  IVPB   IV Intermittent   remdesivir  IVPB 100 milliGRAM(s) IV Intermittent every 24 hours      Medications:  acetaminophen     Tablet .. 650 milliGRAM(s) Oral every 6 hours PRN  apixaban 5 milliGRAM(s) Oral every 12 hours  ARIPiprazole 25 milliGRAM(s) Oral daily  carBAMazepine ER Tablet 400 milliGRAM(s) Oral two times a day  cholecalciferol 2000 Unit(s) Oral two times a day  dexAMETHasone  Injectable 6 milliGRAM(s) IV Push daily  diVALproex  milliGRAM(s) Oral daily  levothyroxine 88 MICROGram(s) Oral daily  levothyroxine 100 MICROGram(s) Oral daily  metoprolol tartrate 25 milliGRAM(s) Oral two times a day  pantoprazole    Tablet 40 milliGRAM(s) Oral before breakfast  remdesivir  IVPB   IV Intermittent   remdesivir  IVPB 100 milliGRAM(s) IV Intermittent every 24 hours  valproic acid 1000 milliGRAM(s) Oral two times a day      Review of Systems:  A 10-point review of systems was obtained.     Pertinent positives and negatives--  Constitutional: No fevers. No Chills. No Rigors.   Cardiovascular: No chest pain. No palpitations.  Respiratory: No shortness of breath. No cough.  Gastrointestinal: No nausea or vomiting. No diarrhea or constipation.   Psychiatric: Pleasant. Appropriate affect.    Review of systems otherwise negative except as previously noted.    Allergies: No Known Allergies    For details regarding the patient's past medical history, social history, family history, and other miscellaneous elements, please refer the initial infectious diseases consultation and/or the admitting history and physical examination for this admission.    Objective:  Vitals:   T(C): 37.5 (12-31-21 @ 05:23), Max: 37.8 (12-30-21 @ 20:06)  HR: 82 (12-31-21 @ 05:23) (77 - 83)  BP: 135/81 (12-31-21 @ 05:23) (117/66 - 135/81)  RR: 19 (12-31-21 @ 05:23) (17 - 19)  SpO2: 99% (12-31-21 @ 05:23) (90% - 99%)    Physical Examination:  General: no acute distress  HEENT: NC/AT, EOMI, anicteric, no oral lesions  Neck: supple, no palpable LAD  Cardio: S1, S2 heard, RRR, no murmurs  Resp: breath sounds heard bilaterally, no rales, wheezes or rhonchi  Abd: soft, NT, ND, + bowel sounds  Neuro: no obvious focal deficits  Ext: no edema or cyanosis  Skin: warm, dry, no visible rash      Laboratory Studies:  CBC:                       12.6   6.29  )-----------( 230      ( 30 Dec 2021 09:56 )             37.8     CMP: 12-30    140  |  109<H>  |  22  ----------------------------<  131<H>  4.3   |  24  |  1.00    Ca    8.1<L>      30 Dec 2021 09:56    TPro  7.2  /  Alb  2.4<L>  /  TBili  0.4  /  DBili  x   /  AST  85<H>  /  ALT  39  /  AlkPhos  51  12-30    LIVER FUNCTIONS - ( 30 Dec 2021 09:56 )  Alb: 2.4 g/dL / Pro: 7.2 g/dL / ALK PHOS: 51 U/L / ALT: 39 U/L / AST: 85 U/L / GGT: x               Microbiology: reviewed    Culture - Urine (collected 12-28-21 @ 05:32)  Source: Clean Catch Clean Catch (Midstream)  Final Report (12-29-21 @ 13:41):    No growth    Culture - Blood (collected 12-28-21 @ 00:33)  Source: .Blood Blood-Peripheral  Preliminary Report (12-29-21 @ 01:02):    No growth to date.    Culture - Blood (collected 12-28-21 @ 00:33)  Source: .Blood Blood-Peripheral  Preliminary Report (12-29-21 @ 01:02):    No growth to date.        Radiology: reviewed       [facility], Division of Infectious Diseases  MARIO Morales Y. Patel, S. Shah, G. [facility]  [phone]    Name: CHAD DICKEY  Age: 61y  Gender: Male  MRN: [id number]    Interval History:  Patient seen and examined at bedside this morning  No acute overnight events. Tm 100F  No complaints continues to remain on NC  Notes reviewed    Antibiotics:  remdesivir  IVPB   IV Intermittent   remdesivir  IVPB 100 milliGRAM(s) IV Intermittent every 24 hours      Medications:  acetaminophen     Tablet .. 650 milliGRAM(s) Oral every 6 hours PRN  apixaban 5 milliGRAM(s) Oral every 12 hours  ARIPiprazole 25 milliGRAM(s) Oral daily  carBAMazepine ER Tablet 400 milliGRAM(s) Oral two times a day  cholecalciferol 2000 Unit(s) Oral two times a day  dexAMETHasone  Injectable 6 milliGRAM(s) IV Push daily  diVALproex  milliGRAM(s) Oral daily  levothyroxine 88 MICROGram(s) Oral daily  levothyroxine 100 MICROGram(s) Oral daily  metoprolol tartrate 25 milliGRAM(s) Oral two times a day  pantoprazole    Tablet 40 milliGRAM(s) Oral before breakfast  remdesivir  IVPB   IV Intermittent   remdesivir  IVPB 100 milliGRAM(s) IV Intermittent every 24 hours  valproic acid 1000 milliGRAM(s) Oral two times a day      Review of Systems:  A 10-point review of systems was obtained.     Review of systems otherwise negative except as previously noted.    Allergies: No Known Allergies    For details regarding the patient's past medical history, social history, family history, and other miscellaneous elements, please refer the initial infectious diseases consultation and/or the admitting history and physical examination for this admission.    Objective:  Vitals:   T(C): 37.5 (12-31-21 @ 05:23), Max: 37.8 (12-30-21 @ 20:06)  HR: 82 (12-31-21 @ 05:23) (77 - 83)  BP: 135/81 (12-31-21 @ 05:23) (117/66 - 135/81)  RR: 19 (12-31-21 @ 05:23) (17 - 19)  SpO2: 99% (12-31-21 @ 05:23) (90% - 99%)    Physical Examination:  General: no acute distress  HEENT: NC/AT, EOMI, anicteric, no oral lesions  Neck: supple, no palpable LAD  Cardio: S1, S2 heard, RRR, no murmurs  Resp: breath sounds heard bilaterally, no rales, wheezes or rhonchi  Abd: soft, NT, ND, + bowel sounds  Neuro: no obvious focal deficits  Ext: no edema or cyanosis  Skin: warm, dry, no visible rash      Laboratory Studies:  CBC:                       12.6   6.29  )-----------( 230      ( 30 Dec 2021 09:56 )             37.8     CMP: 12-30    140  |  109<H>  |  22  ----------------------------<  131<H>  4.3   |  24  |  1.00    Ca    8.1<L>      30 Dec 2021 09:56    TPro  7.2  /  Alb  2.4<L>  /  TBili  0.4  /  DBili  x   /  AST  85<H>  /  ALT  39  /  AlkPhos  51  12-30    LIVER FUNCTIONS - ( 30 Dec 2021 09:56 )  Alb: 2.4 g/dL / Pro: 7.2 g/dL / ALK PHOS: 51 U/L / ALT: 39 U/L / AST: 85 U/L / GGT: x               Microbiology: reviewed    Culture - Urine (collected 12-28-21 @ 05:32)  Source: Clean Catch Clean Catch (Midstream)  Final Report (12-29-21 @ 13:41):    No growth    Culture - Blood (collected 12-28-21 @ 00:33)  Source: .Blood Blood-Peripheral  Preliminary Report (12-29-21 @ 01:02):    No growth to date.    Culture - Blood (collected 12-28-21 @ 00:33)  Source: .Blood Blood-Peripheral  Preliminary Report (12-29-21 @ 01:02):    No growth to date.        Radiology: reviewed

## 2021-12-31 NOTE — PROGRESS NOTE ADULT - ASSESSMENT
Patient is a 60 year old male with PMH of bipolar disorder, intellectual developmental disability, anxiety, hypothyroidism, HLD, BPH, eczema who was diagnosed with COVID 1 week ago and was brought in for fever, cough, weakness and body aches and admitted for hypoxia due to COVID pneumonia.   +COVID on admission, negative flu  CXR reviewed, b/l infiltrates  CT chest imaging reviewed, c/w COVID  Vaccinated with Pfizer x2  NLR on admission - 4.51/0.99= 4.6    Recommendations:  12/27 - afebrile, on NC 1.5L, NLR -6.37/0.51 = 12.5. Started on dexamethasone and remdesivir. Started on pip-tazo for possible bacterial pna, will check for ferritin/pct, if ratio >1000 then would d/c pip-tazo. On heparin for vte ppx. Prone as tolerated. Monitor CBC with diff, inflammatory markers.    12/28 - febrile last night likely d/t COVID, remains on NC, continue remdesivir x5d, dexamethasone x10d. FPR ~3600 - low suspicion for superimposed bacterial pneumonia. UA negative. Follow blood cultures - if remains negative x48h - discontinue pip-tazo. Supplemental O2 as needed, wean as tolerated.    12/29 - NLR 6.58/0.65= 10, on NC 6L, fevers likely due to COVID. Continue remdesivir, dexamethasone. FPR remains >1000. Bcx NGTD x2 - if remains negative x48h, can d/c pip-tazo.   12/30 - on NC 6L, fever curve better Tm 100F in last 24h. Continue remdesivir until 12/31. Continue dexamethasone x10d. Blood and urine cultures negative - will discontinue pip-tazo. Continue supportive care. Monitor CBC, inflammatory markers, LFTs.     Infectious Diseases will continue to follow. Please call with any questions.   Calista Espinoza M.D.  [facility], Division of Infectious Diseases [phone]     Patient is a 60 year old male with PMH of bipolar disorder, intellectual developmental disability, anxiety, hypothyroidism, HLD, BPH, eczema who was diagnosed with COVID 1 week ago and was brought in for fever, cough, weakness and body aches and admitted for hypoxia due to COVID pneumonia.   +COVID on admission, negative flu  CXR reviewed, b/l infiltrates  CT chest imaging reviewed, c/w COVID  Vaccinated with Pfizer x2  NLR on admission - 4.51/0.99= 4.6    Recommendations:  12/27 - afebrile, on NC 1.5L, NLR -6.37/0.51 = 12.5. Started on dexamethasone and remdesivir. Started on pip-tazo for possible bacterial pna, will check for ferritin/pct, if ratio >1000 then would d/c pip-tazo. On heparin for vte ppx. Prone as tolerated. Monitor CBC with diff, inflammatory markers.    12/28 - febrile last night likely d/t COVID, remains on NC, continue remdesivir x5d, dexamethasone x10d. FPR ~3600 - low suspicion for superimposed bacterial pneumonia. UA negative. Follow blood cultures - if remains negative x48h - discontinue pip-tazo. Supplemental O2 as needed, wean as tolerated.    12/29 - NLR 6.58/0.65= 10, on NC 6L, fevers likely due to COVID. Continue remdesivir, dexamethasone. FPR remains >1000. Bcx NGTD x2 - if remains negative x48h, can d/c pip-tazo.   12/30 - on NC 6L, fever curve better Tm 100F in last 24h. Continue remdesivir until 12/31. Continue dexamethasone x10d. Blood and urine cultures negative - will discontinue pip-tazo. Continue supportive care. Monitor CBC, inflammatory markers, LFTs.   12/31 - c/w on supplemental O2. Continue remdesivir until 12/31. Continue dexamethasone x10d. Blood and urine cultures negative - will discontinue pip-tazo. Continue supportive care. Monitor CBC, inflammatory markers, LFTs.    Infectious Diseases will continue to follow. Please call with any questions.   Calista Espinoza M.D.  [facility], Division of Infectious Diseases [phone]

## 2021-12-31 NOTE — PROGRESS NOTE ADULT - ASSESSMENT
NOBLE BONILLA 60 M [address] P 12/26/2021 1958 DR GALEN ROSA     REVIEW OF SYMPTOMS      Able to give ROS  Yes     RELIABLE +/-   CONSTITUTIONAL Weakness Yes  Chills No   ENDOCRINE  No heat or cold intolerance    ALLERGY No hives  Sore throat No stridor  RESP Coughing blood no  Shortness of breath YES   NEURO No Headache  Confusion Pain neck No   CARDIAC No Chest pain No Palpitations   GI  Pain abdomen NO   Vomiting NO     PHYSICAL EXAM    HEENT Unremarkable  atraumatic   RESP Fair air entry EXP prolonged    Harsh breath sound Resp distres mild   CARDIAC S1 S2 No S3     NO JVD    ABDOMEN SOFT BS PRESENT NOT DISTENDED No hepatosplenomegaly PEDAL EDEMA present No calf tenderness  NO rash     ____  DOA CC.  12/26/2021 COVID diagn 12/13  Fever cough  _____                            HOSPITAL COURSE.  COVID poa 12/26/2021   DEXA 12/26  RDVZ 12/27/2021    BACT INFECTN   12/26/2021 zosyn    HYPOTHYROID pmh  NEURO PROBLEMS pmh  12/26/2021 aripiprazole 25  12/26/2021 carbamazepine 400.2   12/26/2021 depakot 1000.2        _____                            GOC. 12/26/2021 Full code   COVID STATUS. 12/26/2021 scv2 (+)   ICU STAY. none    PMH.  PMH GERD   PMH BPH   PMH BIPOLAR  PMH MILD INTELLECTUAL DISABILITY       BEST PRACTICE ISSUES.                                                  HEAD OF BED ELEVATION. Yes  DVT PROPHYLAXIS.     12/29 apixa 5/.2 (a f)   12/27/2021 hpsc   12/26/2021 lvnx 40   dced                                  STEIN PROPHYLAXIS.  12/26/2021 protonix 40                                                                                       DIET.     12/26/2021 puree                      INFECTION PROPHYLAXIS.    SPEECH SWALLOW RECOMMENDATIONS.  12/28/2021 soft bite    IV FLUIDS.   12/28 ns 50     PATIENT DATA   VITALS/PO/IO/VENT/DRIPS.  12/31/2021 afeb 71 120/60   12/31/2021 6l 91%    12/30/2021 afeb 80 117/60      ASSESSMENT/RECOMMENDATIONS.    RESP.   Monitor po Target po 90-95%    HEMODYNAMICS.   Monitor bp Target MAP 65 (+)    HYTN  12/28 metoprolol 25.2     OXYGENATION STATUS.   12/30/2021 6l 90%    RO VTE.  D-dimr 12/28 d-di 738  V duplx 12/27/2021 v duplx (-)    INFECTION.   Surveillance for bacterial infection.  No ongoing bacterial infection suspected    COVID.  Scv2 12/26/2021 (+)  Ferrn 12/27-11/28/2021 ferrn 1024 - 1173   Cr 12/26-12/27-12/28/2021 Cr 1.4 - 1.1-1.3  LFTS 12/26/2021  AP. 149  AST. 39  ALT. 53  ct 12/27/2021 ct ch periph bl opac   remdesevir 12/27/2021 remdesev  Dexa 12/26/2021 dexa 6 x 10d  a/r Monitor inflammatory markers  Monitor thrombotic markers   Monitor oxygenation  12/27/2021 ID started remdes    TIME SPENT   Over 25 minutes aggregate care time spent on encounter; activities included   direct patient care, counseling and/or coordinating care reviewing notes, lab data/ imaging , discussion with multidisciplinary team/ patient  /family and explaining in detail risks, benefits, alternatives  of the recommendations     NOBLE BONILLA 60 M [address] P 12/26/2021 1958 DR GALEN ROSA

## 2021-12-31 NOTE — PROGRESS NOTE ADULT - ASSESSMENT
61 m hypothyroid a/w COVID PNA  s/p IV ABX, antivirals  s/p heparin, abx    	pt had Atrial flutter on admission, converted to NSR tues last night. s/p heparin. now on Eliquis 5 BID    BP/HR stable on metoprolol 25 mg BID.   Will follow prn

## 2021-12-31 NOTE — PROGRESS NOTE ADULT - SUBJECTIVE AND OBJECTIVE BOX
Patient is a 61y Male with a known history of :  Acute respiratory distress syndrome (ARDS) due to COVID-19 virus [U07.1]      HPI:      REVIEW OF SYSTEMS:    CONSTITUTIONAL: No weakness, fevers or chills.   EYES/ENT: No visual changes;    NECK: No pain or stiffness  RESPIRATORY: No cough, wheezing, No shortness of breath  CARDIOVASCULAR: No chest pain or palpitations  GASTROINTESTINAL: No abdominal pain, or hematochezia.  GENITOURINARY: No dysuria orhematuria  NEUROLOGICAL: No numbness or weakness  SKIN: No itching, burning, rashes  All other review of systems is negative unless indicated above    MEDICATIONS  (STANDING):  apixaban 5 milliGRAM(s) Oral every 12 hours  ARIPiprazole 25 milliGRAM(s) Oral daily  carBAMazepine ER Tablet 400 milliGRAM(s) Oral two times a day  cholecalciferol 2000 Unit(s) Oral two times a day  dexAMETHasone  Injectable 6 milliGRAM(s) IV Push daily  diVALproex  milliGRAM(s) Oral daily  levothyroxine 88 MICROGram(s) Oral daily  levothyroxine 100 MICROGram(s) Oral daily  metoprolol tartrate 25 milliGRAM(s) Oral two times a day  pantoprazole    Tablet 40 milliGRAM(s) Oral before breakfast  remdesivir  IVPB   IV Intermittent   remdesivir  IVPB 100 milliGRAM(s) IV Intermittent every 24 hours  valproic acid 1000 milliGRAM(s) Oral two times a day    MEDICATIONS  (PRN):  acetaminophen     Tablet .. 650 milliGRAM(s) Oral every 6 hours PRN Temp greater or equal to 38C (100.4F)      ALLERGIES: No Known Allergies      FAMILY HISTORY:      PHYSICAL EXAMINATION:  -----------------------------  T(C): 37.5 (12-31-21 @ 05:23), Max: 37.8 (12-30-21 @ 20:06)  HR: 82 (12-31-21 @ 05:23) (77 - 83)  BP: 135/81 (12-31-21 @ 05:23) (117/66 - 135/81)  RR: 19 (12-31-21 @ 05:23) (17 - 19)  SpO2: 99% (12-31-21 @ 05:23) (90% - 99%)  Wt(kg): --    12-30 @ 07:01  -  12-31 @ 07:00  --------------------------------------------------------  IN:    sodium chloride 0.9%: 1000 mL  Total IN: 1000 mL    OUT:    Voided (mL): 700 mL  Total OUT: 700 mL    Total NET: 300 mL          LABS:   --------  12-30    140  |  109<H>  |  22  ----------------------------<  131<H>  4.3   |  24  |  1.00    Ca    8.1<L>      30 Dec 2021 09:56    TPro  7.2  /  Alb  2.4<L>  /  TBili  0.4  /  DBili  x   /  AST  85<H>  /  ALT  39  /  AlkPhos  51  12-30                         12.6   6.29  )-----------( 230      ( 30 Dec 2021 09:56 )             37.8

## 2021-12-31 NOTE — PROGRESS NOTE ADULT - SUBJECTIVE AND OBJECTIVE BOX
CHAD DICKEY    PLV 2NOR 242 W1    Allergies    No Known Allergies    Intolerances        PAST MEDICAL & SURGICAL HISTORY:  Hypothyroid        FAMILY HISTORY:      Home Medications:  ARIPiprazole 10 mg oral tablet: 0.5 tab(s) orally once a day (in the morning)      *Takes along with 20mg for a TDD: 25mg * (2021 10:12)  ARIPiprazole 20 mg oral tablet: 1 tab(s) orally once a day (in the morning)    *Takes along with 5mg for a TDD: 25mg* (2021 10:12)  Calcium 600+D oral tablet: 1 tab(s) orally 2 times a day (2021 10:12)  carBAMazepine 400 mg oral tablet, extended release: 1 tab(s) orally 2 times a day (2021 10:12)  Cetaphil topical lotion: Apply topically to affected area 2 times a day (2021 10:12)  divalproex sodium 250 mg oral delayed release tablet: 1 tab(s) orally once a day (in the morning)    *Takes along with 500mg for a TDD in the morninmg  (2021 10:12)  divalproex sodium 500 mg oral delayed release tablet: 2 tab(s) orally 2 times a day at 3pm and 8pm  (2021 10:12)  Fish Oil 1000 mg oral capsule: 1 cap(s) orally once a day (in the morning) go back to usual home dose (15 Raymond 2021 19:47)  levothyroxine 100 mcg (0.1 mg) oral tablet: 1 tab(s) orally once a day     *Takes with 88mcg (1hr apart) for a TDD; 188mcg* follow home protocol (15 Raymond 2021 19:47)  levothyroxine 88 mcg (0.088 mg) oral tablet: 1 tab(s) orally once a day    *Takes with 100mcg (1hr apart) for a TDD; 188mcg * follow home protocol (15 Raymond 2021 19:47)  OLANZapine 20 mg oral tablet: 0.5 tab(s) orally once a day (in the evening) (2021 10:12)  oxybutynin 10 mg/24 hr oral tablet, extended release: 1 tab(s) orally once a day (2021 10:12)  tamsulosin 0.4 mg oral capsule: 1 cap(s) orally once a day (at bedtime) (2021 10:12)  Vitamin D3 1000 intl units (25 mcg) oral tablet:  (2021 10:12)      MEDICATIONS  (STANDING):  apixaban 5 milliGRAM(s) Oral every 12 hours  ARIPiprazole 25 milliGRAM(s) Oral daily  carBAMazepine ER Tablet 400 milliGRAM(s) Oral two times a day  cholecalciferol 2000 Unit(s) Oral two times a day  dexAMETHasone  Injectable 6 milliGRAM(s) IV Push daily  diVALproex  milliGRAM(s) Oral daily  levothyroxine 88 MICROGram(s) Oral daily  levothyroxine 100 MICROGram(s) Oral daily  metoprolol tartrate 25 milliGRAM(s) Oral two times a day  pantoprazole    Tablet 40 milliGRAM(s) Oral before breakfast  remdesivir  IVPB   IV Intermittent   remdesivir  IVPB 100 milliGRAM(s) IV Intermittent every 24 hours  valproic acid 1000 milliGRAM(s) Oral two times a day    MEDICATIONS  (PRN):  acetaminophen     Tablet .. 650 milliGRAM(s) Oral every 6 hours PRN Temp greater or equal to 38C (100.4F)              Vital Signs Last 24 Hrs  T(C): 37.5 (31 Dec 2021 05:23), Max: 37.8 (30 Dec 2021 20:06)  T(F): 99.5 (31 Dec 2021 05:23), Max: 100 (30 Dec 2021 20:06)  HR: 82 (31 Dec 2021 05:23) (77 - 83)  BP: 135/81 (31 Dec 2021 05:23) (117/66 - 135/81)  BP(mean): --  RR: 19 (31 Dec 2021 05:23) (17 - 19)  SpO2: 99% (31 Dec 2021 05:23) (90% - 99%)      21 @ 07:01  -  21 @ 07:00  --------------------------------------------------------  IN: 1000 mL / OUT: 700 mL / NET: 300 mL              LABS:                        12.6   6.29  )-----------( 230      ( 30 Dec 2021 09:56 )             37.8         140  |  109<H>  |  22  ----------------------------<  131<H>  4.3   |  24  |  1.00    Ca    8.1<L>      30 Dec 2021 09:56    TPro  7.2  /  Alb  2.4<L>  /  TBili  0.4  /  DBili  x   /  AST  85<H>  /  ALT  39  /  AlkPhos  51                WBC:  WBC Count: 6.29 K/uL ( @ 09:56)  WBC Count: 8.12 K/uL ( @ 11:25)  WBC Count: 7.32 K/uL ( @ 14:29)  WBC Count: 7.36 K/uL ( @ 09:36)      MICROBIOLOGY:  RECENT CULTURES:   Clean Catch Clean Catch (Midstream) XXXX XXXX   No growth     .Blood Blood-Peripheral XXXX XXXX   No growth to date.                    Sodium:  Sodium, Serum: 140 mmol/L ( @ 09:56)  Sodium, Serum: 137 mmol/L ( @ 11:25)  Sodium, Serum: 138 mmol/L ( @ 14:29)  Sodium, Serum: 138 mmol/L ( @ 09:36)      1.00 mg/dL  @ 09:56  0.99 mg/dL  @ 06:59  1.10 mg/dL  @ 11:25  1.10 mg/dL  @ 08:55  1.30 mg/dL  @ 14:29  1.20 mg/dL  @ 07:43  1.10 mg/dL  @ 09:36      Hemoglobin:  Hemoglobin: 12.6 g/dL ( @ 09:56)  Hemoglobin: 13.6 g/dL ( @ 11:25)  Hemoglobin: 15.0 g/dL ( @ 14:29)  Hemoglobin: 15.2 g/dL ( @ 09:36)      Platelets: Platelet Count - Automated: 230 K/uL ( @ 09:56)  Platelet Count - Automated: 237 K/uL ( @ 11:25)  Platelet Count - Automated: 192 K/uL ( @ 14:29)  Platelet Count - Automated: 170 K/uL ( @ 09:36)      LIVER FUNCTIONS - ( 30 Dec 2021 09:56 )  Alb: 2.4 g/dL / Pro: 7.2 g/dL / ALK PHOS: 51 U/L / ALT: 39 U/L / AST: 85 U/L / GGT: x                 RADIOLOGY & ADDITIONAL STUDIES:      MICROBIOLOGY:  RECENT CULTURES:   Clean Catch Clean Catch (Midstream) XXXX XXXX   No growth     .Blood Blood-Peripheral XXXX XXXX   No growth to date.

## 2022-01-01 LAB
ALBUMIN SERPL ELPH-MCNC: 2.1 G/DL — LOW (ref 3.3–5)
ALBUMIN SERPL ELPH-MCNC: 2.1 G/DL — LOW (ref 3.3–5)
ALP SERPL-CCNC: 67 U/L — SIGNIFICANT CHANGE UP (ref 40–120)
ALP SERPL-CCNC: 72 U/L — SIGNIFICANT CHANGE UP (ref 40–120)
ALT FLD-CCNC: 33 U/L — SIGNIFICANT CHANGE UP (ref 12–78)
ALT FLD-CCNC: 34 U/L — SIGNIFICANT CHANGE UP (ref 12–78)
ANION GAP SERPL CALC-SCNC: 5 MMOL/L — SIGNIFICANT CHANGE UP (ref 5–17)
AST SERPL-CCNC: 65 U/L — HIGH (ref 15–37)
AST SERPL-CCNC: 66 U/L — HIGH (ref 15–37)
BILIRUB DIRECT SERPL-MCNC: 0.2 MG/DL — SIGNIFICANT CHANGE UP (ref 0–0.3)
BILIRUB INDIRECT FLD-MCNC: 0.2 MG/DL — SIGNIFICANT CHANGE UP (ref 0.2–1)
BILIRUB SERPL-MCNC: 0.4 MG/DL — SIGNIFICANT CHANGE UP (ref 0.2–1.2)
BILIRUB SERPL-MCNC: 0.4 MG/DL — SIGNIFICANT CHANGE UP (ref 0.2–1.2)
BUN SERPL-MCNC: 17 MG/DL — SIGNIFICANT CHANGE UP (ref 7–23)
CALCIUM SERPL-MCNC: 8.6 MG/DL — SIGNIFICANT CHANGE UP (ref 8.5–10.1)
CHLORIDE SERPL-SCNC: 112 MMOL/L — HIGH (ref 96–108)
CO2 SERPL-SCNC: 25 MMOL/L — SIGNIFICANT CHANGE UP (ref 22–31)
CREAT SERPL-MCNC: 0.81 MG/DL — SIGNIFICANT CHANGE UP (ref 0.5–1.3)
FERRITIN SERPL-MCNC: 734 NG/ML — HIGH (ref 30–400)
GLUCOSE SERPL-MCNC: 122 MG/DL — HIGH (ref 70–99)
HCT VFR BLD CALC: 38.8 % — LOW (ref 39–50)
HGB BLD-MCNC: 12.9 G/DL — LOW (ref 13–17)
MCHC RBC-ENTMCNC: 30.4 PG — SIGNIFICANT CHANGE UP (ref 27–34)
MCHC RBC-ENTMCNC: 33.2 GM/DL — SIGNIFICANT CHANGE UP (ref 32–36)
MCV RBC AUTO: 91.3 FL — SIGNIFICANT CHANGE UP (ref 80–100)
NRBC # BLD: 0 /100 WBCS — SIGNIFICANT CHANGE UP (ref 0–0)
PHOSPHATE SERPL-MCNC: 2.4 MG/DL — LOW (ref 2.5–4.5)
PLATELET # BLD AUTO: 365 K/UL — SIGNIFICANT CHANGE UP (ref 150–400)
POTASSIUM SERPL-MCNC: 4.6 MMOL/L — SIGNIFICANT CHANGE UP (ref 3.5–5.3)
POTASSIUM SERPL-SCNC: 4.6 MMOL/L — SIGNIFICANT CHANGE UP (ref 3.5–5.3)
PROCALCITONIN SERPL-MCNC: 0.18 NG/ML — HIGH (ref 0–0.04)
PROT SERPL-MCNC: 7.3 G/DL — SIGNIFICANT CHANGE UP (ref 6–8.3)
PROT SERPL-MCNC: 7.4 G/DL — SIGNIFICANT CHANGE UP (ref 6–8.3)
RBC # BLD: 4.25 M/UL — SIGNIFICANT CHANGE UP (ref 4.2–5.8)
RBC # FLD: 13.6 % — SIGNIFICANT CHANGE UP (ref 10.3–14.5)
SODIUM SERPL-SCNC: 142 MMOL/L — SIGNIFICANT CHANGE UP (ref 135–145)
WBC # BLD: 7.26 K/UL — SIGNIFICANT CHANGE UP (ref 3.8–10.5)
WBC # FLD AUTO: 7.26 K/UL — SIGNIFICANT CHANGE UP (ref 3.8–10.5)

## 2022-01-01 RX ADMIN — Medication 88 MICROGRAM(S): at 05:48

## 2022-01-01 RX ADMIN — APIXABAN 5 MILLIGRAM(S): 2.5 TABLET, FILM COATED ORAL at 17:55

## 2022-01-01 RX ADMIN — Medication 25 MILLIGRAM(S): at 05:48

## 2022-01-01 RX ADMIN — APIXABAN 5 MILLIGRAM(S): 2.5 TABLET, FILM COATED ORAL at 05:48

## 2022-01-01 RX ADMIN — Medication 25 MILLIGRAM(S): at 17:55

## 2022-01-01 RX ADMIN — Medication 2000 UNIT(S): at 05:50

## 2022-01-01 RX ADMIN — ARIPIPRAZOLE 25 MILLIGRAM(S): 15 TABLET ORAL at 11:35

## 2022-01-01 RX ADMIN — Medication 1000 MILLIGRAM(S): at 05:48

## 2022-01-01 RX ADMIN — Medication 100 MICROGRAM(S): at 05:48

## 2022-01-01 RX ADMIN — Medication 2000 UNIT(S): at 17:55

## 2022-01-01 RX ADMIN — Medication 400 MILLIGRAM(S): at 17:55

## 2022-01-01 RX ADMIN — PANTOPRAZOLE SODIUM 40 MILLIGRAM(S): 20 TABLET, DELAYED RELEASE ORAL at 05:50

## 2022-01-01 RX ADMIN — DIVALPROEX SODIUM 750 MILLIGRAM(S): 500 TABLET, DELAYED RELEASE ORAL at 11:40

## 2022-01-01 RX ADMIN — Medication 1000 MILLIGRAM(S): at 17:54

## 2022-01-01 RX ADMIN — Medication 400 MILLIGRAM(S): at 05:48

## 2022-01-01 RX ADMIN — Medication 6 MILLIGRAM(S): at 05:49

## 2022-01-01 NOTE — PROGRESS NOTE ADULT - ASSESSMENT
Patient is a 60 year old male with PMH of bipolar disorder, intellectual developmental disability, anxiety, hypothyroidism, HLD, BPH, eczema who was diagnosed with COVID 1 week ago and was brought in for fever, cough, weakness and body aches and admitted for hypoxia due to COVID pneumonia.   +COVID on admission, negative flu  CXR reviewed, b/l infiltrates  CT chest imaging reviewed, c/w COVID  Vaccinated with Pfizer x2  NLR on admission - 4.51/0.99= 4.6    Recommendations:  12/27 - afebrile, on NC 1.5L, NLR -6.37/0.51 = 12.5. Started on dexamethasone and remdesivir. Started on pip-tazo for possible bacterial pna, will check for ferritin/pct, if ratio >1000 then would d/c pip-tazo. On heparin for vte ppx. Prone as tolerated. Monitor CBC with diff, inflammatory markers.    12/28 - febrile last night likely d/t COVID, remains on NC, continue remdesivir x5d, dexamethasone x10d. FPR ~3600 - low suspicion for superimposed bacterial pneumonia. UA negative. Follow blood cultures - if remains negative x48h - discontinue pip-tazo. Supplemental O2 as needed, wean as tolerated.    12/29 - NLR 6.58/0.65= 10, on NC 6L, fevers likely due to COVID. Continue remdesivir, dexamethasone. FPR remains >1000. Bcx NGTD x2 - if remains negative x48h, can d/c pip-tazo.   12/30 - on NC 6L, fever curve better Tm 100F in last 24h. Continue remdesivir until 12/31. Continue dexamethasone x10d. Blood and urine cultures negative - will discontinue pip-tazo. Continue supportive care. Monitor CBC, inflammatory markers, LFTs.   12/31 - c/w on supplemental O2. Continue remdesivir until 12/31. Continue dexamethasone x10d. Blood and urine cultures negative - will discontinue pip-tazo. Continue supportive care. Monitor CBC, inflammatory markers, LFTs.  1/1 - c/w supplemental O2. S/p remdesivir. C/w steroids.     Infectious Diseases will continue to follow. Please call with any questions.   Calista Espinoza M.D.  [facility], Division of Infectious Diseases [phone]

## 2022-01-01 NOTE — PROGRESS NOTE ADULT - ASSESSMENT
NOBLE BONILLA 60 M [facility] P 12/26/2021 1958 DR GALEN ROSA     REVIEW OF SYMPTOMS      Able to give ROS  Yes     RELIABLE +/-   CONSTITUTIONAL Weakness Yes  Chills No   ENDOCRINE  No heat or cold intolerance    ALLERGY No hives  Sore throat No stridor  RESP Coughing blood no  Shortness of breath YES   NEURO No Headache  Confusion Pain neck No   CARDIAC No Chest pain No Palpitations   GI  Pain abdomen NO   Vomiting NO     PHYSICAL EXAM    HEENT Unremarkable  atraumatic   RESP Fair air entry EXP prolonged    Harsh breath sound Resp distres mild   CARDIAC S1 S2 No S3     NO JVD    ABDOMEN SOFT BS PRESENT NOT DISTENDED No hepatosplenomegaly PEDAL EDEMA present No calf tenderness  NO rash     ____  DOA CC.  12/26/2021 COVID diagn 12/13  Fever cough  _____                            HOSPITAL COURSE.  COVID poa 12/26/2021   DEXA 12/26  RDVZ 12/27/2021    BACT INFECTN   12/26/2021 zosyn    HYPOTHYROID pmh  NEURO PROBLEMS pmh  12/26/2021 aripiprazole 25  12/26/2021 carbamazepine 400.2   12/26/2021 depakot 1000.2       PATIENT DATA   VITALS/PO/IO/VENT/DRIPS.  1/1/2022 afeb 69 130/70   1/1/2022 6l 92%     ASSESSMENT/RECOMMENDATIONS.    RESP.   Monitor po Target po 90-95%    HEMODYNAMICS.   Monitor bp Target MAP 65 (+)    HYTN  12/28 metoprolol 25.2     OXYGENATION STATUS.  1/1/2022 6l 92%     RO VTE.  D-dimr 12/28 d-di 738  V duplx 12/27/2021 v duplx (-)    INFECTION.   Surveillance for bacterial infection.  No ongoing bacterial infection suspected    COVID.  Scv2 12/26/2021 (+)  Ferrn 12/27-11/28/2021 ferrn 1024 - 1173   Cr 12/26-12/27-12/28/2021 Cr 1.4 - 1.1-1.3  LFTS 12/26/2021  AP. 149  AST. 39  ALT. 53  ct 12/27/2021 ct ch periph bl opac   remdesevir 12/27/2021 remdesev  Dexa 12/26/2021 dexa 6 x 10d  a/r Monitor inflammatory markers  Monitor thrombotic markers   Monitor oxygenation  12/27/2021 ID started remdes    TIME SPENT   Over 25 minutes aggregate care time spent on encounter; activities included   direct patient care, counseling and/or coordinating care reviewing notes, lab data/ imaging , discussion with multidisciplinary team/ patient  /family and explaining in detail risks, benefits, alternatives  of the recommendations     NOBLE BONILLA 60 M [facility] P 12/26/2021 1958 DR GALEN ROSA

## 2022-01-01 NOTE — PROGRESS NOTE ADULT - SUBJECTIVE AND OBJECTIVE BOX
[facility], Division of Infectious Diseases  MARIO Morales Y. Patel, S. Shah, G. [facility]  [phone]    Name: CHAD DICKEY  Age: 61y  Gender: Male  MRN: [id number]    Interval History:  Patient seen and examined at bedside this morning  No acute overnight events. Afebrile  Sleeping comfortably on 6L  Notes reviewed    Antibiotics:      Medications:  acetaminophen     Tablet .. 650 milliGRAM(s) Oral every 6 hours PRN  apixaban 5 milliGRAM(s) Oral every 12 hours  ARIPiprazole 25 milliGRAM(s) Oral daily  carBAMazepine ER Tablet 400 milliGRAM(s) Oral two times a day  cholecalciferol 2000 Unit(s) Oral two times a day  dexAMETHasone  Injectable 6 milliGRAM(s) IV Push daily  diVALproex  milliGRAM(s) Oral daily  levothyroxine 88 MICROGram(s) Oral daily  levothyroxine 100 MICROGram(s) Oral daily  metoprolol tartrate 25 milliGRAM(s) Oral two times a day  pantoprazole    Tablet 40 milliGRAM(s) Oral before breakfast  valproic acid 1000 milliGRAM(s) Oral two times a day      Review of Systems:  unable to obtain    Allergies: No Known Allergies    For details regarding the patient's past medical history, social history, family history, and other miscellaneous elements, please refer the initial infectious diseases consultation and/or the admitting history and physical examination for this admission.    Objective:  Vitals:   T(C): 37.5 (01-01-22 @ 05:20), Max: 37.5 (01-01-22 @ 05:20)  HR: 91 (01-01-22 @ 05:20) (61 - 91)  BP: 132/75 (01-01-22 @ 05:20) (126/68 - 132/75)  RR: 18 (01-01-22 @ 05:20) (18 - 18)  SpO2: 91% (01-01-22 @ 05:20) (91% - 96%)    Physical Examination:  General: no acute distress  HEENT: NC/AT, EOMI  Neck: supple, no palpable LAD  Cardio: S1, S2 heard, RRR, no murmurs  Resp: decreased b/l breath sounds on NC  Abd: soft, NT, ND, + bowel sounds  Ext: no edema or cyanosis  Skin: warm, dry, no visible rash      Laboratory Studies:  CBC:                       12.9   7.26  )-----------( 365      ( 01 Jan 2022 09:20 )             38.8     CMP: 01-01    142  |  112<H>  |  17  ----------------------------<  122<H>  4.6   |  25  |  0.81    Ca    8.6      01 Jan 2022 09:20  Phos  2.4     01-01    TPro  7.3  /  Alb  2.1<L>  /  TBili  0.4  /  DBili  0.2  /  AST  65<H>  /  ALT  34  /  AlkPhos  67  01-01    LIVER FUNCTIONS - ( 01 Jan 2022 09:20 )  Alb: 2.1 g/dL / Pro: 7.3 g/dL / ALK PHOS: 67 U/L / ALT: 34 U/L / AST: 65 U/L / GGT: x               Microbiology: reviewed    Culture - Urine (collected 12-28-21 @ 05:32)  Source: Clean Catch Clean Catch (Midstream)  Final Report (12-29-21 @ 13:41):    No growth    Culture - Blood (collected 12-28-21 @ 00:33)  Source: .Blood Blood-Peripheral  Preliminary Report (12-29-21 @ 01:02):    No growth to date.    Culture - Blood (collected 12-28-21 @ 00:33)  Source: .Blood Blood-Peripheral  Preliminary Report (12-29-21 @ 01:02):    No growth to date.        Radiology: reviewed       [facility], Division of Infectious Diseases  MARIO Morales Y. Patel, S. Shah, G. [facility]  [phone]    Name: CHAD DICKEY  Age: 61y  Gender: Male  MRN: [id number]    Interval History:  Patient seen at bedside this morning  No acute overnight events. Afebrile  Sleeping comfortably on 6L  Notes reviewed    Antibiotics:      Medications:  acetaminophen     Tablet .. 650 milliGRAM(s) Oral every 6 hours PRN  apixaban 5 milliGRAM(s) Oral every 12 hours  ARIPiprazole 25 milliGRAM(s) Oral daily  carBAMazepine ER Tablet 400 milliGRAM(s) Oral two times a day  cholecalciferol 2000 Unit(s) Oral two times a day  dexAMETHasone  Injectable 6 milliGRAM(s) IV Push daily  diVALproex  milliGRAM(s) Oral daily  levothyroxine 88 MICROGram(s) Oral daily  levothyroxine 100 MICROGram(s) Oral daily  metoprolol tartrate 25 milliGRAM(s) Oral two times a day  pantoprazole    Tablet 40 milliGRAM(s) Oral before breakfast  valproic acid 1000 milliGRAM(s) Oral two times a day      Review of Systems:  unable to obtain    Allergies: No Known Allergies    For details regarding the patient's past medical history, social history, family history, and other miscellaneous elements, please refer the initial infectious diseases consultation and/or the admitting history and physical examination for this admission.    Objective:  Vitals:   T(C): 37.5 (01-01-22 @ 05:20), Max: 37.5 (01-01-22 @ 05:20)  HR: 91 (01-01-22 @ 05:20) (61 - 91)  BP: 132/75 (01-01-22 @ 05:20) (126/68 - 132/75)  RR: 18 (01-01-22 @ 05:20) (18 - 18)  SpO2: 91% (01-01-22 @ 05:20) (91% - 96%)    Physical Examination:  General: no acute distress  HEENT: NC/AT, EOMI  Neck: supple, no palpable LAD  Cardio: S1, S2 heard, RRR, no murmurs  Resp: decreased b/l breath sounds on NC  Abd: soft, NT, ND, + bowel sounds  Ext: no edema or cyanosis  Skin: warm, dry, no visible rash      Laboratory Studies:  CBC:                       12.9   7.26  )-----------( 365      ( 01 Jan 2022 09:20 )             38.8     CMP: 01-01    142  |  112<H>  |  17  ----------------------------<  122<H>  4.6   |  25  |  0.81    Ca    8.6      01 Jan 2022 09:20  Phos  2.4     01-01    TPro  7.3  /  Alb  2.1<L>  /  TBili  0.4  /  DBili  0.2  /  AST  65<H>  /  ALT  34  /  AlkPhos  67  01-01    LIVER FUNCTIONS - ( 01 Jan 2022 09:20 )  Alb: 2.1 g/dL / Pro: 7.3 g/dL / ALK PHOS: 67 U/L / ALT: 34 U/L / AST: 65 U/L / GGT: x               Microbiology: reviewed    Culture - Urine (collected 12-28-21 @ 05:32)  Source: Clean Catch Clean Catch (Midstream)  Final Report (12-29-21 @ 13:41):    No growth    Culture - Blood (collected 12-28-21 @ 00:33)  Source: .Blood Blood-Peripheral  Preliminary Report (12-29-21 @ 01:02):    No growth to date.    Culture - Blood (collected 12-28-21 @ 00:33)  Source: .Blood Blood-Peripheral  Preliminary Report (12-29-21 @ 01:02):    No growth to date.        Radiology: reviewed

## 2022-01-01 NOTE — PROGRESS NOTE ADULT - SUBJECTIVE AND OBJECTIVE BOX
CHAD DICKEY    PLV 2NOR 242 W1    Allergies    No Known Allergies    Intolerances        PAST MEDICAL & SURGICAL HISTORY:  Hypothyroid        FAMILY HISTORY:      Home Medications:  ARIPiprazole 10 mg oral tablet: 0.5 tab(s) orally once a day (in the morning)      *Takes along with 20mg for a TDD: 25mg * (2021 10:12)  ARIPiprazole 20 mg oral tablet: 1 tab(s) orally once a day (in the morning)    *Takes along with 5mg for a TDD: 25mg* (2021 10:12)  Calcium 600+D oral tablet: 1 tab(s) orally 2 times a day (2021 10:12)  carBAMazepine 400 mg oral tablet, extended release: 1 tab(s) orally 2 times a day (2021 10:12)  Cetaphil topical lotion: Apply topically to affected area 2 times a day (2021 10:12)  divalproex sodium 250 mg oral delayed release tablet: 1 tab(s) orally once a day (in the morning)    *Takes along with 500mg for a TDD in the morninmg  (2021 10:12)  divalproex sodium 500 mg oral delayed release tablet: 2 tab(s) orally 2 times a day at 3pm and 8pm  (2021 10:12)  Fish Oil 1000 mg oral capsule: 1 cap(s) orally once a day (in the morning) go back to usual home dose (15 Raymond 2021 19:47)  levothyroxine 100 mcg (0.1 mg) oral tablet: 1 tab(s) orally once a day     *Takes with 88mcg (1hr apart) for a TDD; 188mcg* follow home protocol (15 Raymond 2021 19:47)  levothyroxine 88 mcg (0.088 mg) oral tablet: 1 tab(s) orally once a day    *Takes with 100mcg (1hr apart) for a TDD; 188mcg * follow home protocol (15 Raymond 2021 19:47)  OLANZapine 20 mg oral tablet: 0.5 tab(s) orally once a day (in the evening) (2021 10:12)  oxybutynin 10 mg/24 hr oral tablet, extended release: 1 tab(s) orally once a day (2021 10:12)  tamsulosin 0.4 mg oral capsule: 1 cap(s) orally once a day (at bedtime) (2021 10:12)  Vitamin D3 1000 intl units (25 mcg) oral tablet:  (2021 10:12)      MEDICATIONS  (STANDING):  apixaban 5 milliGRAM(s) Oral every 12 hours  ARIPiprazole 25 milliGRAM(s) Oral daily  carBAMazepine ER Tablet 400 milliGRAM(s) Oral two times a day  cholecalciferol 2000 Unit(s) Oral two times a day  dexAMETHasone  Injectable 6 milliGRAM(s) IV Push daily  diVALproex  milliGRAM(s) Oral daily  levothyroxine 88 MICROGram(s) Oral daily  levothyroxine 100 MICROGram(s) Oral daily  metoprolol tartrate 25 milliGRAM(s) Oral two times a day  pantoprazole    Tablet 40 milliGRAM(s) Oral before breakfast  valproic acid 1000 milliGRAM(s) Oral two times a day    MEDICATIONS  (PRN):  acetaminophen     Tablet .. 650 milliGRAM(s) Oral every 6 hours PRN Temp greater or equal to 38C (100.4F)              Vital Signs Last 24 Hrs  T(C): 37.5 (2022 05:20), Max: 37.5 (2022 05:20)  T(F): 99.5 (2022 05:20), Max: 99.5 (2022 05:20)  HR: 91 (2022 05:20) (61 - 91)  BP: 132/75 (2022 05:20) (126/68 - 132/75)  BP(mean): --  RR: 18 (2022 05:20) (18 - 18)  SpO2: 91% (2022 05:20) (91% - 96%)      21 @ 07:01  -  22 @ 07:00  --------------------------------------------------------  IN: 0 mL / OUT: 600 mL / NET: -600 mL              LABS:                        12.9   7.26  )-----------( x        ( 2022 09:20 )             38.8         140  |  109<H>  |  22  ----------------------------<  131<H>  4.3   |  24  |  1.00    Ca    8.1<L>      30 Dec 2021 09:56    TPro  7.2  /  Alb  2.4<L>  /  TBili  0.4  /  DBili  x   /  AST  85<H>  /  ALT  39  /  AlkPhos  51                WBC:  WBC Count: 7.26 K/uL ( @ 09:20)  WBC Count: 6.29 K/uL ( @ 09:56)  WBC Count: 8.12 K/uL ( @ 11:25)  WBC Count: 7.32 K/uL ( @ 14:29)      MICROBIOLOGY:  RECENT CULTURES:   Clean Catch Clean Catch (Midstream) XXXX XXXX   No growth     .Blood Blood-Peripheral XXXX XXXX   No growth to date.                    Sodium:  Sodium, Serum: 140 mmol/L ( @ 09:56)  Sodium, Serum: 137 mmol/L ( @ 11:25)  Sodium, Serum: 138 mmol/L ( @ 14:29)      1.00 mg/dL  @ 09:56  0.99 mg/dL  @ 06:59  1.10 mg/dL  @ 11:25  1.10 mg/dL  @ 08:55  1.30 mg/dL  @ 14:29      Hemoglobin:  Hemoglobin: 12.9 g/dL ( @ 09:20)  Hemoglobin: 12.6 g/dL ( @ 09:56)  Hemoglobin: 13.6 g/dL ( @ 11:25)  Hemoglobin: 15.0 g/dL ( @ 14:29)      Platelets: Platelet Count - Automated: 230 K/uL ( @ 09:56)  Platelet Count - Automated: 237 K/uL ( @ 11:25)  Platelet Count - Automated: 192 K/uL ( @ 14:29)      LIVER FUNCTIONS - ( 30 Dec 2021 09:56 )  Alb: 2.4 g/dL / Pro: 7.2 g/dL / ALK PHOS: 51 U/L / ALT: 39 U/L / AST: 85 U/L / GGT: x                 RADIOLOGY & ADDITIONAL STUDIES:      MICROBIOLOGY:  RECENT CULTURES:   Clean Catch Clean Catch (Midstream) XXXX XXXX   No growth     .Blood Blood-Peripheral XXXX XXXX   No growth to date.

## 2022-01-02 LAB
ALBUMIN SERPL ELPH-MCNC: 2.2 G/DL — LOW (ref 3.3–5)
ALP SERPL-CCNC: 81 U/L — SIGNIFICANT CHANGE UP (ref 40–120)
ALT FLD-CCNC: 34 U/L — SIGNIFICANT CHANGE UP (ref 12–78)
AST SERPL-CCNC: 64 U/L — HIGH (ref 15–37)
BILIRUB DIRECT SERPL-MCNC: 0.2 MG/DL — SIGNIFICANT CHANGE UP (ref 0–0.3)
BILIRUB INDIRECT FLD-MCNC: 0.2 MG/DL — SIGNIFICANT CHANGE UP (ref 0.2–1)
BILIRUB SERPL-MCNC: 0.4 MG/DL — SIGNIFICANT CHANGE UP (ref 0.2–1.2)
CREAT SERPL-MCNC: 0.87 MG/DL — SIGNIFICANT CHANGE UP (ref 0.5–1.3)
CULTURE RESULTS: SIGNIFICANT CHANGE UP
CULTURE RESULTS: SIGNIFICANT CHANGE UP
PROT SERPL-MCNC: 7.5 G/DL — SIGNIFICANT CHANGE UP (ref 6–8.3)
SPECIMEN SOURCE: SIGNIFICANT CHANGE UP
SPECIMEN SOURCE: SIGNIFICANT CHANGE UP

## 2022-01-02 RX ADMIN — DIVALPROEX SODIUM 750 MILLIGRAM(S): 500 TABLET, DELAYED RELEASE ORAL at 11:41

## 2022-01-02 RX ADMIN — Medication 2000 UNIT(S): at 05:26

## 2022-01-02 RX ADMIN — Medication 6 MILLIGRAM(S): at 05:27

## 2022-01-02 RX ADMIN — APIXABAN 5 MILLIGRAM(S): 2.5 TABLET, FILM COATED ORAL at 17:07

## 2022-01-02 RX ADMIN — Medication 400 MILLIGRAM(S): at 05:26

## 2022-01-02 RX ADMIN — Medication 400 MILLIGRAM(S): at 17:07

## 2022-01-02 RX ADMIN — APIXABAN 5 MILLIGRAM(S): 2.5 TABLET, FILM COATED ORAL at 05:26

## 2022-01-02 RX ADMIN — Medication 1000 MILLIGRAM(S): at 05:27

## 2022-01-02 RX ADMIN — Medication 100 MICROGRAM(S): at 05:26

## 2022-01-02 RX ADMIN — Medication 2000 UNIT(S): at 17:07

## 2022-01-02 RX ADMIN — Medication 88 MICROGRAM(S): at 05:25

## 2022-01-02 RX ADMIN — Medication 25 MILLIGRAM(S): at 05:25

## 2022-01-02 RX ADMIN — Medication 1000 MILLIGRAM(S): at 17:07

## 2022-01-02 RX ADMIN — Medication 25 MILLIGRAM(S): at 17:07

## 2022-01-02 RX ADMIN — PANTOPRAZOLE SODIUM 40 MILLIGRAM(S): 20 TABLET, DELAYED RELEASE ORAL at 05:28

## 2022-01-02 RX ADMIN — ARIPIPRAZOLE 25 MILLIGRAM(S): 15 TABLET ORAL at 11:41

## 2022-01-02 NOTE — PROGRESS NOTE ADULT - SUBJECTIVE AND OBJECTIVE BOX
Patient is a 61y Male with a known history of :  Acute respiratory distress syndrome (ARDS) due to COVID-19 virus [U07.1]      HPI:      REVIEW OF SYSTEMS:    CONSTITUTIONAL: No weakness, fevers or chills.   EYES/ENT: No visual changes;    NECK: No pain or stiffness  RESPIRATORY: No cough, wheezing, No shortness of breath  CARDIOVASCULAR: No chest pain or palpitations  GASTROINTESTINAL: No abdominal pain, or hematochezia.  GENITOURINARY: No dysuria orhematuria  NEUROLOGICAL: No numbness or weakness  SKIN: No itching, burning, rashes  All other review of systems is negative unless indicated above      MEDICATIONS  (STANDING):  apixaban 5 milliGRAM(s) Oral every 12 hours  ARIPiprazole 25 milliGRAM(s) Oral daily  carBAMazepine ER Tablet 400 milliGRAM(s) Oral two times a day  cholecalciferol 2000 Unit(s) Oral two times a day  dexAMETHasone  Injectable 6 milliGRAM(s) IV Push daily  diVALproex  milliGRAM(s) Oral daily  levothyroxine 88 MICROGram(s) Oral daily  levothyroxine 100 MICROGram(s) Oral daily  metoprolol tartrate 25 milliGRAM(s) Oral two times a day  pantoprazole    Tablet 40 milliGRAM(s) Oral before breakfast  valproic acid 1000 milliGRAM(s) Oral two times a day    MEDICATIONS  (PRN):  acetaminophen     Tablet .. 650 milliGRAM(s) Oral every 6 hours PRN Temp greater or equal to 38C (100.4F)      ALLERGIES: No Known Allergies      FAMILY HISTORY:      PHYSICAL EXAMINATION:  -----------------------------  T(C): 36.9 (01-02-22 @ 11:37), Max: 37.2 (01-01-22 @ 17:53)  HR: 73 (01-02-22 @ 11:37) (69 - 73)  BP: 116/72 (01-02-22 @ 11:37) (116/72 - 146/85)  RR: 18 (01-02-22 @ 11:37) (18 - 20)  SpO2: 94% (01-02-22 @ 11:37) (92% - 94%)  Wt(kg): --    01-01 @ 07:01 - 01-02 @ 07:00  --------------------------------------------------------  IN:    Oral Fluid: 700 mL  Total IN: 700 mL    OUT:    Blood Loss (mL): 1 mL    Voided (mL): 2150 mL  Total OUT: 2151 mL    Total NET: -1451 mL      01-02 @ 07:01 - 01-02 @ 14:07  --------------------------------------------------------  IN:    Oral Fluid: 300 mL  Total IN: 300 mL    OUT:  Total OUT: 0 mL    Total NET: 300 mL      PHYSICAL EXAM:  Constitutional: NAD  Neurological: Alert, no focal deficits  HEENT: no JVD, EOMI  Cardiovascular: Regular, S1 and S2, no murmur  Pulmonary: breath sounds bilaterally  Gastrointestinal: Bowel Sounds present, soft, nontender  EXT:  no peripheral edema  Skin: No rashes.  Psych:  Mood calm  LABS: All Labs Reviewed:            LABS:   --------  01-02    x   |  x   |  x   ----------------------------<  x   x    |  x   |  0.87    Ca    8.6      01 Jan 2022 09:20  Phos  2.4     01-01    TPro  7.5  /  Alb  2.2<L>  /  TBili  0.4  /  DBili  0.2  /  AST  64<H>  /  ALT  34  /  AlkPhos  81  01-02                         12.9   7.26  )-----------( 365      ( 01 Jan 2022 09:20 )             38.8

## 2022-01-02 NOTE — PROGRESS NOTE ADULT - ASSESSMENT
61 m hypothyroid a/w COVID PNA  s/p IV ABX, antivirals  s/p heparin, abx    -S/p Atrial flutter on admission  spontaneously converted to NSR.  s/p heparin. now on Eliquis 5 BID  BP/HR stable on metoprolol 25 mg BID.   Hgb stable on AC.     Will follow prn

## 2022-01-02 NOTE — PROGRESS NOTE ADULT - ASSESSMENT
Patient is a 60 year old male with PMH of bipolar disorder, intellectual developmental disability, anxiety, hypothyroidism, HLD, BPH, eczema who was diagnosed with COVID 1 week ago and was brought in for fever, cough, weakness and body aches and admitted for hypoxia due to COVID pneumonia.   +COVID on admission, negative flu  CXR reviewed, b/l infiltrates  CT chest imaging reviewed, c/w COVID  Vaccinated with Pfizer x2  NLR on admission - 4.51/0.99= 4.6    Recommendations:  12/27 - afebrile, on NC 1.5L, NLR -6.37/0.51 = 12.5. Started on dexamethasone and remdesivir. Started on pip-tazo for possible bacterial pna, will check for ferritin/pct, if ratio >1000 then would d/c pip-tazo. On heparin for vte ppx. Prone as tolerated. Monitor CBC with diff, inflammatory markers.    12/28 - febrile last night likely d/t COVID, remains on NC, continue remdesivir x5d, dexamethasone x10d. FPR ~3600 - low suspicion for superimposed bacterial pneumonia. UA negative. Follow blood cultures - if remains negative x48h - discontinue pip-tazo. Supplemental O2 as needed, wean as tolerated.    12/29 - NLR 6.58/0.65= 10, on NC 6L, fevers likely due to COVID. Continue remdesivir, dexamethasone. FPR remains >1000. Bcx NGTD x2 - if remains negative x48h, can d/c pip-tazo.   12/30 - on NC 6L, fever curve better Tm 100F in last 24h. Continue remdesivir until 12/31. Continue dexamethasone x10d. Blood and urine cultures negative - will discontinue pip-tazo. Continue supportive care. Monitor CBC, inflammatory markers, LFTs.   12/31 - c/w on supplemental O2. Continue remdesivir until 12/31. Continue dexamethasone x10d. Blood and urine cultures negative - will discontinue pip-tazo. Continue supportive care. Monitor CBC, inflammatory markers, LFTs.  1/1 - c/w supplemental O2. S/p remdesivir. C/w steroids.   1/2 - c/w supplemental O2. S/p remdesivir. C/w steroids x10 day course.     Infectious Diseases will continue to follow. Please call with any questions.   Calista Espinoza M.D.  [facility], Division of Infectious Diseases [phone]

## 2022-01-02 NOTE — PROGRESS NOTE ADULT - ASSESSMENT
NOBLE BONILLA 60 M [facility] P 12/26/2021 1958 DR GALEN ROSA     REVIEW OF SYMPTOMS      Able to give ROS  Yes     RELIABLE +/-   CONSTITUTIONAL Weakness Yes  Chills No   ENDOCRINE  No heat or cold intolerance    ALLERGY No hives  Sore throat No stridor  RESP Coughing blood no  Shortness of breath YES   NEURO No Headache  Confusion Pain neck No   CARDIAC No Chest pain No Palpitations   GI  Pain abdomen NO   Vomiting NO     PHYSICAL EXAM    HEENT Unremarkable  atraumatic   RESP Fair air entry EXP prolonged    Harsh breath sound Resp distres mild   CARDIAC S1 S2 No S3     NO JVD    ABDOMEN SOFT BS PRESENT NOT DISTENDED No hepatosplenomegaly PEDAL EDEMA present No calf tenderness  NO rash     ____  DOA CC.  12/26/2021 COVID diagn 12/13  Fever cough  _____                            HOSPITAL COURSE.  COVID poa 12/26/2021   DEXA 12/26  RDVZ 12/27/2021    BACT INFECTN   12/26/2021 zosyn    HYPOTHYROID pmh  NEURO PROBLEMS pmh  12/26/2021 aripiprazole 25  12/26/2021 carbamazepine 400.2   12/26/2021 depakot 1000.2        _____                            GOC. 12/26/2021 Full code   COVID STATUS. 12/26/2021 scv2 (+)   ICU STAY. none    PMH.  PMH GERD   PMH BPH   PMH BIPOLAR  PMH MILD INTELLECTUAL DISABILITY       BEST PRACTICE ISSUES.                                                  HEAD OF BED ELEVATION. Yes  DVT PROPHYLAXIS.     12/29 apixa 5/.2 (a f)   12/27/2021 hpsc   12/26/2021 lvnx 40   dced                                  STEIN PROPHYLAXIS.  12/26/2021 protonix 40                                                                                       DIET.     12/26/2021 puree                      INFECTION PROPHYLAXIS.    SPEECH SWALLOW RECOMMENDATIONS.  12/28/2021 soft bite    IV FLUIDS.   12/28 ns 50     PATIENT DATA   VITALS/PO/IO/VENT/DRIPS.  1/2/2022 afeb 74 110/70      ASSESSMENT/RECOMMENDATIONS.    RESP.   Monitor po Target po 90-95%    HEMODYNAMICS.   Monitor bp Target MAP 65 (+)  echo 12/30 n lvsf    HYTN  12/28 metoprolol 25.2     OXYGENATION STATUS.  1/2/2022 6l 92%    RO VTE.  D-dimr 12/28 d-di 738  V duplx 12/27/2021 v duplx (-)    INFECTION.   Surveillance for bacterial infection.  No ongoing bacterial infection suspected    COVID.  Scv2 12/26/2021 (+)  Ferrn 12/27-11/28/2021 ferrn 1024 - 1173   Cr 12/26-12/27-12/28/2021 Cr 1.4 - 1.1-1.3  LFTS 12/26/2021  AP. 149  AST. 39  ALT. 53  ct 12/27/2021 ct ch periph bl opac   remdesevir 12/27/2021 remdesev  Dexa 12/26/2021 dexa 6 x 10d  a/r Monitor inflammatory markers  Monitor thrombotic markers   Monitor oxygenation  12/27/2021 ID started remdes    TIME SPENT   Over 25 minutes aggregate care time spent on encounter; activities included   direct patient care, counseling and/or coordinating care reviewing notes, lab data/ imaging , discussion with multidisciplinary team/ patient  /family and explaining in detail risks, benefits, alternatives  of the recommendations     NOBLE BONILLA 60 M [facility] P 12/26/2021 1958 DR GALEN ROSA

## 2022-01-02 NOTE — PROGRESS NOTE ADULT - SUBJECTIVE AND OBJECTIVE BOX
[facility], Division of Infectious Diseases  MARIO Morales Y. Patel, S. Shah, G. [facility]  [phone]    Name: CHAD DICKEY  Age: 61y  Gender: Male  MRN: [id number]    Interval History:  Patient seen and examined at bedside this morning  No acute overnight events. Afebrile, low grade temps to 99F  on NC 6L  Notes reviewed    Antibiotics:      Medications:  acetaminophen     Tablet .. 650 milliGRAM(s) Oral every 6 hours PRN  apixaban 5 milliGRAM(s) Oral every 12 hours  ARIPiprazole 25 milliGRAM(s) Oral daily  carBAMazepine ER Tablet 400 milliGRAM(s) Oral two times a day  cholecalciferol 2000 Unit(s) Oral two times a day  dexAMETHasone  Injectable 6 milliGRAM(s) IV Push daily  diVALproex  milliGRAM(s) Oral daily  levothyroxine 88 MICROGram(s) Oral daily  levothyroxine 100 MICROGram(s) Oral daily  metoprolol tartrate 25 milliGRAM(s) Oral two times a day  pantoprazole    Tablet 40 milliGRAM(s) Oral before breakfast  valproic acid 1000 milliGRAM(s) Oral two times a day      Review of Systems:  A 10-point review of systems was obtained.   Review of systems otherwise negative except as previously noted.    Allergies: No Known Allergies    For details regarding the patient's past medical history, social history, family history, and other miscellaneous elements, please refer the initial infectious diseases consultation and/or the admitting history and physical examination for this admission.    Objective:  Vitals:   T(C): 37.2 (01-02-22 @ 05:15), Max: 37.3 (01-01-22 @ 13:19)  HR: 69 (01-02-22 @ 05:15) (69 - 71)  BP: 136/75 (01-02-22 @ 05:15) (127/78 - 146/85)  RR: 19 (01-02-22 @ 05:15) (18 - 20)  SpO2: 92% (01-02-22 @ 05:15) (92% - 93%)    Physical Examination:  General: no acute distress  HEENT: NC/AT, EOMI, anicteric, no oral lesions  Neck: supple, no palpable LAD  Cardio: S1, S2 heard, RRR, no murmurs  Resp: decreased b/l breath sounds  Abd: soft, NT, ND, + bowel sounds  Ext: no edema or cyanosis  Skin: warm, dry, no visible rash      Laboratory Studies:  CBC:                       12.9   7.26  )-----------( 365      ( 01 Jan 2022 09:20 )             38.8     CMP: 01-02    x   |  x   |  x   ----------------------------<  x   x    |  x   |  0.87    Ca    8.6      01 Jan 2022 09:20  Phos  2.4     01-01    TPro  7.5  /  Alb  2.2<L>  /  TBili  0.4  /  DBili  0.2  /  AST  64<H>  /  ALT  34  /  AlkPhos  81  01-02    LIVER FUNCTIONS - ( 02 Jan 2022 09:59 )  Alb: 2.2 g/dL / Pro: 7.5 g/dL / ALK PHOS: 81 U/L / ALT: 34 U/L / AST: 64 U/L / GGT: x               Microbiology: reviewed    Culture - Urine (collected 12-28-21 @ 05:32)  Source: Clean Catch Clean Catch (Midstream)  Final Report (12-29-21 @ 13:41):    No growth    Culture - Blood (collected 12-28-21 @ 00:33)  Source: .Blood Blood-Peripheral  Final Report (01-02-22 @ 01:00):    No Growth Final    Culture - Blood (collected 12-28-21 @ 00:33)  Source: .Blood Blood-Peripheral  Final Report (01-02-22 @ 01:00):    No Growth Final        Radiology: reviewed

## 2022-01-02 NOTE — PROGRESS NOTE ADULT - SUBJECTIVE AND OBJECTIVE BOX
CHAD DICKEY    PLV 2NOR 242 W1    Allergies    No Known Allergies    Intolerances        PAST MEDICAL & SURGICAL HISTORY:  Hypothyroid        FAMILY HISTORY:      Home Medications:  ARIPiprazole 10 mg oral tablet: 0.5 tab(s) orally once a day (in the morning)      *Takes along with 20mg for a TDD: 25mg * (2021 10:12)  ARIPiprazole 20 mg oral tablet: 1 tab(s) orally once a day (in the morning)    *Takes along with 5mg for a TDD: 25mg* (2021 10:12)  Calcium 600+D oral tablet: 1 tab(s) orally 2 times a day (2021 10:12)  carBAMazepine 400 mg oral tablet, extended release: 1 tab(s) orally 2 times a day (2021 10:12)  Cetaphil topical lotion: Apply topically to affected area 2 times a day (2021 10:12)  divalproex sodium 250 mg oral delayed release tablet: 1 tab(s) orally once a day (in the morning)    *Takes along with 500mg for a TDD in the morninmg  (2021 10:12)  divalproex sodium 500 mg oral delayed release tablet: 2 tab(s) orally 2 times a day at 3pm and 8pm  (2021 10:12)  Fish Oil 1000 mg oral capsule: 1 cap(s) orally once a day (in the morning) go back to usual home dose (15 Raymond 2021 19:47)  levothyroxine 100 mcg (0.1 mg) oral tablet: 1 tab(s) orally once a day     *Takes with 88mcg (1hr apart) for a TDD; 188mcg* follow home protocol (15 Raymond 2021 19:47)  levothyroxine 88 mcg (0.088 mg) oral tablet: 1 tab(s) orally once a day    *Takes with 100mcg (1hr apart) for a TDD; 188mcg * follow home protocol (15 Raymond 2021 19:47)  OLANZapine 20 mg oral tablet: 0.5 tab(s) orally once a day (in the evening) (2021 10:12)  oxybutynin 10 mg/24 hr oral tablet, extended release: 1 tab(s) orally once a day (2021 10:12)  tamsulosin 0.4 mg oral capsule: 1 cap(s) orally once a day (at bedtime) (2021 10:12)  Vitamin D3 1000 intl units (25 mcg) oral tablet:  (2021 10:12)      MEDICATIONS  (STANDING):  apixaban 5 milliGRAM(s) Oral every 12 hours  ARIPiprazole 25 milliGRAM(s) Oral daily  carBAMazepine ER Tablet 400 milliGRAM(s) Oral two times a day  cholecalciferol 2000 Unit(s) Oral two times a day  dexAMETHasone  Injectable 6 milliGRAM(s) IV Push daily  diVALproex  milliGRAM(s) Oral daily  levothyroxine 88 MICROGram(s) Oral daily  levothyroxine 100 MICROGram(s) Oral daily  metoprolol tartrate 25 milliGRAM(s) Oral two times a day  pantoprazole    Tablet 40 milliGRAM(s) Oral before breakfast  valproic acid 1000 milliGRAM(s) Oral two times a day    MEDICATIONS  (PRN):  acetaminophen     Tablet .. 650 milliGRAM(s) Oral every 6 hours PRN Temp greater or equal to 38C (100.4F)              Vital Signs Last 24 Hrs  T(C): 37.2 (2022 05:15), Max: 37.3 (2022 13:19)  T(F): 99 (2022 05:15), Max: 99.2 (2022 13:19)  HR: 69 (2022 05:15) (69 - 71)  BP: 136/75 (2022 05:15) (127/78 - 146/85)  BP(mean): --  RR: 19 (2022 05:15) (18 - 20)  SpO2: 92% (2022 05:15) (92% - 93%)      22 @ 07:01  -  22 @ 07:00  --------------------------------------------------------  IN: 700 mL / OUT: 2151 mL / NET: -1451 mL              LABS:                        12.9   7.26  )-----------( 365      ( 2022 09:20 )             38.8         x   |  x   |  x   ----------------------------<  x   x    |  x   |  0.87    Ca    8.6      2022 09:20  Phos  2.4         TPro  7.3  /  Alb  2.1<L>  /  TBili  0.4  /  DBili  0.2  /  AST  65<H>  /  ALT  34  /  AlkPhos  67                WBC:  WBC Count: 7.26 K/uL ( @ 09:20)  WBC Count: 6.29 K/uL ( @ 09:56)  WBC Count: 8.12 K/uL ( @ 11:25)      MICROBIOLOGY:  RECENT CULTURES:   Clean Catch Clean Catch (Midstream) XXXX XXXX   No growth     .Blood Blood-Peripheral XXXX XXXX   No Growth Final                    Sodium:  Sodium, Serum: 142 mmol/L ( @ 09:20)  Sodium, Serum: 140 mmol/L ( @ 09:56)  Sodium, Serum: 137 mmol/L ( @ 11:25)      0.87 mg/dL  @ 09:59  0.81 mg/dL  @ 09:20  1.00 mg/dL  @ 09:56  0.99 mg/dL  06:59  1.10 mg/dL  @ 11:25      Hemoglobin:  Hemoglobin: 12.9 g/dL ( @ 09:20)  Hemoglobin: 12.6 g/dL ( @ 09:56)  Hemoglobin: 13.6 g/dL ( @ 11:25)      Platelets: Platelet Count - Automated: 365 K/uL ( @ 09:20)  Platelet Count - Automated: 230 K/uL ( @ 09:56)  Platelet Count - Automated: 237 K/uL ( @ 11:25)      LIVER FUNCTIONS - ( 2022 09:20 )  Alb: 2.1 g/dL / Pro: 7.3 g/dL / ALK PHOS: 67 U/L / ALT: 34 U/L / AST: 65 U/L / GGT: x                 RADIOLOGY & ADDITIONAL STUDIES:      MICROBIOLOGY:  RECENT CULTURES:   Clean Catch Clean Catch (Midstream) XXXX XXXX   No growth    - .Blood Blood-Peripheral XXXX XXXX   No Growth Final

## 2022-01-03 LAB
ALBUMIN SERPL ELPH-MCNC: 2.1 G/DL — LOW (ref 3.3–5)
ALBUMIN SERPL ELPH-MCNC: 2.5 G/DL — LOW (ref 3.3–5)
ALP SERPL-CCNC: 84 U/L — SIGNIFICANT CHANGE UP (ref 40–120)
ALP SERPL-CCNC: 88 U/L — SIGNIFICANT CHANGE UP (ref 40–120)
ALT FLD-CCNC: 32 U/L — SIGNIFICANT CHANGE UP (ref 12–78)
ALT FLD-CCNC: 38 U/L — SIGNIFICANT CHANGE UP (ref 12–78)
ANION GAP SERPL CALC-SCNC: 9 MMOL/L — SIGNIFICANT CHANGE UP (ref 5–17)
AST SERPL-CCNC: 59 U/L — HIGH (ref 15–37)
AST SERPL-CCNC: 64 U/L — HIGH (ref 15–37)
BILIRUB DIRECT SERPL-MCNC: <0.1 MG/DL — SIGNIFICANT CHANGE UP (ref 0–0.3)
BILIRUB INDIRECT FLD-MCNC: >0.2 MG/DL — SIGNIFICANT CHANGE UP (ref 0.2–1)
BILIRUB SERPL-MCNC: 0.3 MG/DL — SIGNIFICANT CHANGE UP (ref 0.2–1.2)
BILIRUB SERPL-MCNC: 0.3 MG/DL — SIGNIFICANT CHANGE UP (ref 0.2–1.2)
BUN SERPL-MCNC: 19 MG/DL — SIGNIFICANT CHANGE UP (ref 7–23)
CALCIUM SERPL-MCNC: 9.1 MG/DL — SIGNIFICANT CHANGE UP (ref 8.5–10.1)
CHLORIDE SERPL-SCNC: 104 MMOL/L — SIGNIFICANT CHANGE UP (ref 96–108)
CO2 SERPL-SCNC: 29 MMOL/L — SIGNIFICANT CHANGE UP (ref 22–31)
CREAT SERPL-MCNC: 0.91 MG/DL — SIGNIFICANT CHANGE UP (ref 0.5–1.3)
CREAT SERPL-MCNC: 0.98 MG/DL — SIGNIFICANT CHANGE UP (ref 0.5–1.3)
GLUCOSE SERPL-MCNC: 96 MG/DL — SIGNIFICANT CHANGE UP (ref 70–99)
HCT VFR BLD CALC: 45.9 % — SIGNIFICANT CHANGE UP (ref 39–50)
HGB BLD-MCNC: 14.9 G/DL — SIGNIFICANT CHANGE UP (ref 13–17)
MCHC RBC-ENTMCNC: 30.3 PG — SIGNIFICANT CHANGE UP (ref 27–34)
MCHC RBC-ENTMCNC: 32.5 GM/DL — SIGNIFICANT CHANGE UP (ref 32–36)
MCV RBC AUTO: 93.3 FL — SIGNIFICANT CHANGE UP (ref 80–100)
POTASSIUM SERPL-MCNC: 4.8 MMOL/L — SIGNIFICANT CHANGE UP (ref 3.5–5.3)
POTASSIUM SERPL-SCNC: 4.8 MMOL/L — SIGNIFICANT CHANGE UP (ref 3.5–5.3)
PROT SERPL-MCNC: 7.6 G/DL — SIGNIFICANT CHANGE UP (ref 6–8.3)
PROT SERPL-MCNC: 8.6 G/DL — HIGH (ref 6–8.3)
RBC # BLD: 4.92 M/UL — SIGNIFICANT CHANGE UP (ref 4.2–5.8)
RBC # FLD: 13.8 % — SIGNIFICANT CHANGE UP (ref 10.3–14.5)
SODIUM SERPL-SCNC: 142 MMOL/L — SIGNIFICANT CHANGE UP (ref 135–145)
WBC # BLD: 9.38 K/UL — SIGNIFICANT CHANGE UP (ref 3.8–10.5)
WBC # FLD AUTO: 9.38 K/UL — SIGNIFICANT CHANGE UP (ref 3.8–10.5)

## 2022-01-03 RX ORDER — ZINC SULFATE TAB 220 MG (50 MG ZINC EQUIVALENT) 220 (50 ZN) MG
220 TAB ORAL DAILY
Refills: 0 | Status: DISCONTINUED | OUTPATIENT
Start: 2022-01-03 | End: 2022-01-07

## 2022-01-03 RX ORDER — CHOLECALCIFEROL (VITAMIN D3) 125 MCG
2000 CAPSULE ORAL
Refills: 0 | Status: DISCONTINUED | OUTPATIENT
Start: 2022-01-03 | End: 2022-01-07

## 2022-01-03 RX ORDER — ASCORBIC ACID 60 MG
500 TABLET,CHEWABLE ORAL DAILY
Refills: 0 | Status: DISCONTINUED | OUTPATIENT
Start: 2022-01-03 | End: 2022-01-07

## 2022-01-03 RX ADMIN — DIVALPROEX SODIUM 750 MILLIGRAM(S): 500 TABLET, DELAYED RELEASE ORAL at 11:58

## 2022-01-03 RX ADMIN — Medication 88 MICROGRAM(S): at 06:10

## 2022-01-03 RX ADMIN — Medication 100 MICROGRAM(S): at 06:10

## 2022-01-03 RX ADMIN — Medication 25 MILLIGRAM(S): at 06:10

## 2022-01-03 RX ADMIN — Medication 1000 MILLIGRAM(S): at 06:33

## 2022-01-03 RX ADMIN — Medication 2000 UNIT(S): at 17:43

## 2022-01-03 RX ADMIN — Medication 1000 MILLIGRAM(S): at 17:45

## 2022-01-03 RX ADMIN — Medication 2000 UNIT(S): at 06:10

## 2022-01-03 RX ADMIN — Medication 400 MILLIGRAM(S): at 06:33

## 2022-01-03 RX ADMIN — APIXABAN 5 MILLIGRAM(S): 2.5 TABLET, FILM COATED ORAL at 06:10

## 2022-01-03 RX ADMIN — ARIPIPRAZOLE 25 MILLIGRAM(S): 15 TABLET ORAL at 11:58

## 2022-01-03 RX ADMIN — Medication 25 MILLIGRAM(S): at 17:45

## 2022-01-03 RX ADMIN — PANTOPRAZOLE SODIUM 40 MILLIGRAM(S): 20 TABLET, DELAYED RELEASE ORAL at 06:09

## 2022-01-03 RX ADMIN — Medication 400 MILLIGRAM(S): at 17:43

## 2022-01-03 RX ADMIN — Medication 6 MILLIGRAM(S): at 06:09

## 2022-01-03 RX ADMIN — APIXABAN 5 MILLIGRAM(S): 2.5 TABLET, FILM COATED ORAL at 17:43

## 2022-01-03 NOTE — CHART NOTE - NSCHARTNOTEFT_GEN_A_CORE
Assessment: patient seen for follow up   61y old  Male who presents with a chief complaint of low oxygen with covid   patient with 26-50% of meals taken per flow sheet   bite size per speech rx mild thick.   1/2 BM   1/1 Phos low       Factors impacting intake: [ ] none [ ] nausea  [ ] vomiting [ ] diarrhea [ ] constipation  [ ]chewing problems [x ] swallowing issues  [ ] other: diet per speech    Diet Prescription soft and bite sized mild thick ensure enlive and ensure pudding   Intake: 26-50%    Current Weight: 12/28 217.1#   % Weight Change    Pertinent Medications: MEDICATIONS  (STANDING):  apixaban 5 milliGRAM(s) Oral every 12 hours  ARIPiprazole 25 milliGRAM(s) Oral daily  carBAMazepine ER Tablet 400 milliGRAM(s) Oral two times a day  cholecalciferol 2000 Unit(s) Oral two times a day  dexAMETHasone  Injectable 6 milliGRAM(s) IV Push daily  diVALproex  milliGRAM(s) Oral daily  levothyroxine 88 MICROGram(s) Oral daily  levothyroxine 100 MICROGram(s) Oral daily  metoprolol tartrate 25 milliGRAM(s) Oral two times a day  pantoprazole    Tablet 40 milliGRAM(s) Oral before breakfast  valproic acid 1000 milliGRAM(s) Oral two times a day    MEDICATIONS  (PRN):  acetaminophen     Tablet .. 650 milliGRAM(s) Oral every 6 hours PRN Temp greater or equal to 38C (100.4F)    Pertinent Labs: Phos 2.4  Skin: no pressure injury + 1 right and left ankle     Estimated Needs:   [x ] no change since previous assessment  [ ] recalculated:     Previous Nutrition Diagnosis:   [ ] Inadequate Energy Intake [x ]Inadequate Oral Intake [ ] Excessive Energy Intake   [ ] Underweight [ ] Increased Nutrient Needs [ ] Overweight/Obesity   [ ] Altered GI Function [ ] Unintended Weight Loss [ ] Food & Nutrition Related Knowledge Deficit [ ] Malnutrition     Nutrition Diagnosis is [x ] ongoing being addressed with supplement  [ ] resolved [ ] not applicable     New Nutrition Diagnosis: [x ] not applicable       Interventions:   Recommend  [ ] Change Diet To:  [ ] Nutrition Supplement  [ ] Nutrition Support  [x ] Other: continue diet as ordered with supplements . recommend MVI, follow weights, trend Phos level supplement as appropriate     Monitoring and Evaluation:   [x ] PO intake [ x ] Tolerance to diet prescription [ x ] weights [ x ] labs[ x ] follow up per protocol  [ ] other:

## 2022-01-03 NOTE — PROGRESS NOTE ADULT - ASSESSMENT
NOBLE BONILLA 60 M [facility] P 12/26/2021 1958 DR GALEN ROSA     REVIEW OF SYMPTOMS      Able to give ROS  Yes     RELIABLE +/-   CONSTITUTIONAL Weakness Yes  Chills No   ENDOCRINE  No heat or cold intolerance    ALLERGY No hives  Sore throat No stridor  RESP Coughing blood no  Shortness of breath YES   NEURO No Headache  Confusion Pain neck No   CARDIAC No Chest pain No Palpitations   GI  Pain abdomen NO   Vomiting NO     PHYSICAL EXAM    HEENT Unremarkable  atraumatic   RESP Fair air entry EXP prolonged    Harsh breath sound Resp distres mild   CARDIAC S1 S2 No S3     NO JVD    ABDOMEN SOFT BS PRESENT NOT DISTENDED No hepatosplenomegaly PEDAL EDEMA present No calf tenderness  NO rash     ____  DOA CC.  12/26/2021 COVID diagn 12/13  Fever cough  _____                            HOSPITAL COURSE.  COVID poa 12/26/2021   DEXA 12/26  RDVZ 12/27/2021    BACT INFECTN   12/26/2021 zosyn    HYPOTHYROID pmh  NEURO PROBLEMS pmh  12/26/2021 aripiprazole 25  12/26/2021 carbamazepine 400.2   12/26/2021 depakot 1000.2     _____                            GOC. 12/26/2021 Full code   COVID STATUS. 12/26/2021 scv2 (+)   ICU STAY. none    PMH.  PMH GERD   PMH BPH   PMH BIPOLAR  PMH MILD INTELLECTUAL DISABILITY      ASSESSMENT/RECOMMENDATIONS.    RESP.   Monitor po Target po 90-95%    HEMODYNAMICS.   Monitor bp Target MAP 65 (+)  echo 12/30 n lvsf    HYTN  12/28 metoprolol 25.2     OXYGENATION STATUS.  1/3/2022 6l 93%    RO VTE.  D-dimr 12/28 d-di 738  V duplx 12/27/2021 v duplx (-)    INFECTION.   Surveillance for bacterial infection.  No ongoing bacterial infection suspected    COVID.  Scv2 12/26/2021 (+)  Ferrn 12/27-11/28/2021 ferrn 1024 - 1173   Cr 12/26-12/27-12/28/2021 Cr 1.4 - 1.1-1.3  LFTS 12/26/2021  AP. 149  AST. 39  ALT. 53  ct 12/27/2021 ct ch periph bl opac   remdesevir 12/27/2021 remdesev  Dexa 12/26/2021 dexa 6 x 10d  a/r Monitor inflammatory markers  Monitor thrombotic markers   Monitor oxygenation  12/27/2021 ID started remdes    TIME SPENT   Over 25 minutes aggregate care time spent on encounter; activities included   direct patient care, counseling and/or coordinating care reviewing notes, lab data/ imaging , discussion with multidisciplinary team/ patient  /family and explaining in detail risks, benefits, alternatives  of the recommendations     NOBLE BONILLA 60 M [facility] P 12/26/2021 1958 DR GALEN ROSA

## 2022-01-03 NOTE — PROGRESS NOTE ADULT - SUBJECTIVE AND OBJECTIVE BOX
PAST MEDICAL & SURGICAL HISTORY:  Hypothyroid        MEDICATIONS  (STANDING):  apixaban 5 milliGRAM(s) Oral every 12 hours  ARIPiprazole 25 milliGRAM(s) Oral daily  carBAMazepine ER Tablet 400 milliGRAM(s) Oral two times a day  cholecalciferol 2000 Unit(s) Oral two times a day  dexAMETHasone  Injectable 6 milliGRAM(s) IV Push daily  diVALproex  milliGRAM(s) Oral daily  levothyroxine 88 MICROGram(s) Oral daily  levothyroxine 100 MICROGram(s) Oral daily  metoprolol tartrate 25 milliGRAM(s) Oral two times a day  pantoprazole    Tablet 40 milliGRAM(s) Oral before breakfast  valproic acid 1000 milliGRAM(s) Oral two times a day    MEDICATIONS  (PRN):  acetaminophen     Tablet .. 650 milliGRAM(s) Oral every 6 hours PRN Temp greater or equal to 38C (100.4F)      Patient is a 61y old  Male who presents with a chief complaint of low oxygen with covid 1 week now (03 Jan 2022 12:39)      Vital Signs Last 24 Hrs  T(C): 36.5 (03 Jan 2022 13:10), Max: 37 (02 Jan 2022 21:10)  T(F): 97.7 (03 Jan 2022 13:10), Max: 98.6 (02 Jan 2022 21:10)  HR: 70 (03 Jan 2022 17:50) (68 - 75)  BP: 108/68 (03 Jan 2022 17:50) (104/71 - 124/72)  BP(mean): --  RR: 18 (03 Jan 2022 13:10) (18 - 19)  SpO2: 90% (03 Jan 2022 13:10) (90% - 91%)          01-02 @ 07:01  -  01-03 @ 07:00  --------------------------------------------------------  IN: 300 mL / OUT: 0 mL / NET: 300 mL        REVIEW OF SYSTEMS:    Constitutional: No fever, weight loss or fatigue  Eyes: No eye pain, visual disturbances, or discharge  ENT:  No difficulty hearing, tinnitus, vertigo; No sinus or throat pain  Neck: No pain or stiffness  Breasts: No pain, masses or nipple discharge  Respiratory: No cough, wheezing, chills or hemoptysis  Cardiovascular: No chest pain, palpitations, shortness of breath, dizziness or leg swelling  Gastrointestinal: No abdominal or epigastric pain. No nausea, vomiting or hematemesis; No diarrhea or constipation. No melena or hematochezia.  Genitourinary: No dysuria, frequency, hematuria or incontinence  Rectal: No pain, hemorrhoids or incontinence  Neurological: No headaches, memory loss, loss of strength, numbness or tremors  Skin: No itching, burning, rashes or lesions   Lymph Nodes: No enlarged glands  Endocrine: No heat or cold intolerance; No hair loss  Musculoskeletal: No joint pain or swelling; No muscle, back or extremity pain  Psychiatric: No depression, anxiety, mood swings or difficulty sleeping  Heme/Lymph: No easy bruising or bleeding gums  Allergy and Immunologic: No hives or eczema    PHYSICAL EXAM:    Constitutional: NAD, well-groomed, well-developed  HEENT: PERRLA, EOMI, Normal Hearing, MMM  Neck: No LAD, No JVD  Back: Normal spine flexure, No CVA tenderness  Respiratory: CTAB/L   Cardiovascular: S1 and S2, RRR, no M/G/R  Gastrointestinal: BS+, soft, NT/ND  Extremities: No peripheral edema  Vascular: 2+ peripheral pulses  Neurological: A/O x 3, no focal deficits  Skin: No rashes      decubiti:       01-03    x   |  x   |  x   ----------------------------<  x   x    |  x   |  0.91      TPro  7.6  /  Alb  2.1<L>  /  TBili  0.3  /  DBili  <0.1  /  AST  64<H>  /  ALT  32  /  AlkPhos  84  01-03              Clean Catch Clean Catch (Midstream)  12-28 @ 05:32   No growth  --  --      .Blood Blood-Peripheral  12-28 @ 00:33   No Growth Final  --  --        Urine Culture:  12-28 @ 05:32    --        No growth  Urine Culture:  12-28 @ 00:33    --        No Growth Final        Radiology:

## 2022-01-03 NOTE — PROGRESS NOTE ADULT - ASSESSMENT
Patient is a 60 year old male with PMH of bipolar disorder, intellectual developmental disability, anxiety, hypothyroidism, HLD, BPH, eczema who was diagnosed with COVID 1 week ago and was brought in for fever, cough, weakness and body aches and admitted for hypoxia due to COVID pneumonia.   +COVID on admission, negative flu  CXR reviewed, b/l infiltrates  CT chest imaging reviewed, c/w COVID  Vaccinated with Pfizer x2  NLR on admission - 4.51/0.99= 4.6    Recommendations:  12/27 - afebrile, on NC 1.5L, NLR -6.37/0.51 = 12.5. Started on dexamethasone and remdesivir. Started on pip-tazo for possible bacterial pna, will check for ferritin/pct, if ratio >1000 then would d/c pip-tazo. On heparin for vte ppx. Prone as tolerated. Monitor CBC with diff, inflammatory markers.    12/28 - febrile last night likely d/t COVID, remains on NC, continue remdesivir x5d, dexamethasone x10d. FPR ~3600 - low suspicion for superimposed bacterial pneumonia. UA negative. Follow blood cultures - if remains negative x48h - discontinue pip-tazo. Supplemental O2 as needed, wean as tolerated.    12/29 - NLR 6.58/0.65= 10, on NC 6L, fevers likely due to COVID. Continue remdesivir, dexamethasone. FPR remains >1000. Bcx NGTD x2 - if remains negative x48h, can d/c pip-tazo.   12/30 - on NC 6L, fever curve better Tm 100F in last 24h. Continue remdesivir until 12/31. Continue dexamethasone x10d. Blood and urine cultures negative - will discontinue pip-tazo. Continue supportive care. Monitor CBC, inflammatory markers, LFTs.   12/31 - c/w on supplemental O2. Continue remdesivir until 12/31. Continue dexamethasone x10d. Blood and urine cultures negative - will discontinue pip-tazo. Continue supportive care. Monitor CBC, inflammatory markers, LFTs.  1/1 - c/w supplemental O2. S/p remdesivir. C/w steroids.   1/2 - c/w supplemental O2. S/p remdesivir. C/w steroids x10 day course.   1/3 - remains on NC 6L, wean as tolerated, cont dexamethasone x10d. Continue supportive care       Lou Tilley M.D.  [facility], Division of Infectious Diseases  [phone]  After 5pm on weekdays and all day on weekends - please call [phone]

## 2022-01-03 NOTE — PROGRESS NOTE ADULT - SUBJECTIVE AND OBJECTIVE BOX
CHAD DICKEY    PLV 2NOR 242 W1    Allergies    No Known Allergies    Intolerances        PAST MEDICAL & SURGICAL HISTORY:  Hypothyroid        FAMILY HISTORY:      Home Medications:  ARIPiprazole 10 mg oral tablet: 0.5 tab(s) orally once a day (in the morning)      *Takes along with 20mg for a TDD: 25mg * (2021 10:12)  ARIPiprazole 20 mg oral tablet: 1 tab(s) orally once a day (in the morning)    *Takes along with 5mg for a TDD: 25mg* (2021 10:12)  Calcium 600+D oral tablet: 1 tab(s) orally 2 times a day (2021 10:12)  carBAMazepine 400 mg oral tablet, extended release: 1 tab(s) orally 2 times a day (2021 10:12)  Cetaphil topical lotion: Apply topically to affected area 2 times a day (2021 10:12)  divalproex sodium 250 mg oral delayed release tablet: 1 tab(s) orally once a day (in the morning)    *Takes along with 500mg for a TDD in the morninmg  (2021 10:12)  divalproex sodium 500 mg oral delayed release tablet: 2 tab(s) orally 2 times a day at 3pm and 8pm  (2021 10:12)  Fish Oil 1000 mg oral capsule: 1 cap(s) orally once a day (in the morning) go back to usual home dose (15 Raymond 2021 19:47)  levothyroxine 100 mcg (0.1 mg) oral tablet: 1 tab(s) orally once a day     *Takes with 88mcg (1hr apart) for a TDD; 188mcg* follow home protocol (15 Raymond 2021 19:47)  levothyroxine 88 mcg (0.088 mg) oral tablet: 1 tab(s) orally once a day    *Takes with 100mcg (1hr apart) for a TDD; 188mcg * follow home protocol (15 Raymond 2021 19:47)  OLANZapine 20 mg oral tablet: 0.5 tab(s) orally once a day (in the evening) (2021 10:12)  oxybutynin 10 mg/24 hr oral tablet, extended release: 1 tab(s) orally once a day (2021 10:12)  tamsulosin 0.4 mg oral capsule: 1 cap(s) orally once a day (at bedtime) (2021 10:12)  Vitamin D3 1000 intl units (25 mcg) oral tablet:  (2021 10:12)      MEDICATIONS  (STANDING):  apixaban 5 milliGRAM(s) Oral every 12 hours  ARIPiprazole 25 milliGRAM(s) Oral daily  carBAMazepine ER Tablet 400 milliGRAM(s) Oral two times a day  cholecalciferol 2000 Unit(s) Oral two times a day  dexAMETHasone  Injectable 6 milliGRAM(s) IV Push daily  diVALproex  milliGRAM(s) Oral daily  levothyroxine 88 MICROGram(s) Oral daily  levothyroxine 100 MICROGram(s) Oral daily  metoprolol tartrate 25 milliGRAM(s) Oral two times a day  pantoprazole    Tablet 40 milliGRAM(s) Oral before breakfast  valproic acid 1000 milliGRAM(s) Oral two times a day    MEDICATIONS  (PRN):  acetaminophen     Tablet .. 650 milliGRAM(s) Oral every 6 hours PRN Temp greater or equal to 38C (100.4F)              Vital Signs Last 24 Hrs  T(C): 36.8 (2022 05:12), Max: 37 (2022 17:05)  T(F): 98.3 (2022 05:12), Max: 98.6 (2022 17:05)  HR: 68 (2022 05:12) (68 - 74)  BP: 104/71 (2022 05:12) (104/71 - 124/72)  BP(mean): --  RR: 19 (2022 05:12) (18 - 19)  SpO2: 91% (2022 05:12) (90% - 94%)      22 @ 07:01  -  22 @ 07:00  --------------------------------------------------------  IN: 300 mL / OUT: 0 mL / NET: 300 mL              LABS:        x   |  x   |  x   ----------------------------<  x   x    |  x   |  0.87      TPro  7.5  /  Alb  2.2<L>  /  TBili  0.4  /  DBili  0.2  /  AST  64<H>  /  ALT  34  /  AlkPhos  81                WBC:  WBC Count: 7.26 K/uL ( @ 09:20)      MICROBIOLOGY:  RECENT CULTURES:   Clean Catch Clean Catch (Midstream) XXXX XXXX   No growth     .Blood Blood-Peripheral XXXX XXXX   No Growth Final                    Sodium:  Sodium, Serum: 142 mmol/L ( @ 09:20)      0.87 mg/dL  @ 09:59  0.81 mg/dL  @ 09:20      Hemoglobin:  Hemoglobin: 12.9 g/dL ( @ 09:20)      Platelets: Platelet Count - Automated: 365 K/uL ( @ 09:20)      LIVER FUNCTIONS - ( 2022 09:59 )  Alb: 2.2 g/dL / Pro: 7.5 g/dL / ALK PHOS: 81 U/L / ALT: 34 U/L / AST: 64 U/L / GGT: x                 RADIOLOGY & ADDITIONAL STUDIES:      MICROBIOLOGY:  RECENT CULTURES:   Clean Catch Clean Catch (Midstream) XXXX XXXX   No growth     .Blood Blood-Peripheral XXXX XXXX   No Growth Final

## 2022-01-03 NOTE — PROGRESS NOTE ADULT - SUBJECTIVE AND OBJECTIVE BOX
[facility] DIVISION of INFECTIOUS DISEASE  Rickey Guzman MD PhD, Tara Mcdonald MD, Calista Espinoza MD, Lou Tilley MD, John Moss MD  and providing coverage with Joy Markham MD and Derik Hooker MD  Providing Infectious Disease Consultations at [facility], [facility]'s      Office# [phone] to schedule follow up appointments  Answering Service for urgent calls or New Consults [phone]    Infectious Diseases Progress Note:    CHAD DICKEY is a 61y year old Male patient    COVID-19 Patient  Notes reviewed  No concerning events overnight.   Allergies: No Known Allergies    ANTIBIOTICS/RELEVANT:  Antimicrobials   Immunologic:   Other Meds: acetaminophen     Tablet .. 650 milliGRAM(s) Oral every 6 hours PRN  apixaban 5 milliGRAM(s) Oral every 12 hours  ARIPiprazole 25 milliGRAM(s) Oral daily  carBAMazepine ER Tablet 400 milliGRAM(s) Oral two times a day  cholecalciferol 2000 Unit(s) Oral two times a day  dexAMETHasone  Injectable 6 milliGRAM(s) IV Push daily  diVALproex  milliGRAM(s) Oral daily  levothyroxine 88 MICROGram(s) Oral daily  levothyroxine 100 MICROGram(s) Oral daily  metoprolol tartrate 25 milliGRAM(s) Oral two times a day  pantoprazole    Tablet 40 milliGRAM(s) Oral before breakfast  valproic acid 1000 milliGRAM(s) Oral two times a day    Objective:  Vital Signs Last 24 Hrs  T(F): 98.3 (03 Jan 2022 05:12), Max: 98.6 (02 Jan 2022 17:05)  HR: 68 (03 Jan 2022 05:12) (68 - 74)  BP: 104/71 (03 Jan 2022 05:12) (104/71 - 124/72)  RR: 19 (03 Jan 2022 05:12) (18 - 19)  SpO2: 91% (03 Jan 2022 05:12) (90% - 92%)  PHYSICAL EXAM:  HEENT: NC/AT, anicteric, supple  Respiratory: no acc muscle use, breathing comfortably  Cardiovascular: S1 S2 present, normal rate  Gastrointestinal: normal appearing, nondistended  Extremities: no edema, no cyanosis    LABS:    WBC trend:  7.26 01-01 @ 09:20  6.29 12-30 @ 09:56  8.12 12-29 @ 11:25  7.32 12-28 @ 14:29    01-03    x   |  x   |  x   ----------------------------<  x   x    |  x   |  0.91      TPro  7.6  /  Alb  2.1<L>  /  TBili  0.3  /  DBili  <0.1  /  AST  64<H>  /  ALT  32  /  AlkPhos  84  01-03    Creatinine, Serum: 0.91 mg/dL (01-03-22 @ 11:02)  Creatinine, Serum: 0.87 mg/dL (01-02-22 @ 09:59)  Creatinine, Serum: 0.81 mg/dL (01-01-22 @ 09:20)  Creatinine, Serum: 1.00 mg/dL (12-30-21 @ 09:56)  Creatinine, Serum: 0.99 mg/dL (12-30-21 @ 06:59)  Creatinine, Serum: 1.10 mg/dL (12-29-21 @ 11:25)  Creatinine, Serum: 1.10 mg/dL (12-29-21 @ 08:55)  Creatinine, Serum: 1.30 mg/dL (12-28-21 @ 14:29)  Creatinine, Serum: 1.20 mg/dL (12-28-21 @ 07:43)    Auto Neutrophil #: 5.16 K/uL (12-30-21 @ 09:56)  Auto Neutrophil #: 6.58 K/uL (12-29-21 @ 11:25)  Auto Neutrophil #: 6.23 K/uL (12-28-21 @ 14:29)  Auto Neutrophil #: 6.37 K/uL (12-27-21 @ 09:36)  Auto Neutrophil #: 4.51 K/uL (12-26-21 @ 20:50)    Auto Lymphocyte #: 0.44 K/uL (12-30-21 @ 09:56)  Auto Lymphocyte #: 0.65 K/uL (12-29-21 @ 11:25)  Auto Lymphocyte #: 0.61 K/uL (12-28-21 @ 14:29)  Auto Lymphocyte #: 0.51 K/uL (12-27-21 @ 09:36)  Auto Lymphocyte #: 0.99 K/uL (12-26-21 @ 20:50)    Procalcitonin, Serum: 0.18 ng/mL (01-01-22 @ 09:20)  Procalcitonin, Serum: 0.49 ng/mL (12-28-21 @ 07:43)  Procalcitonin, Serum: 0.28 ng/mL (12-27-21 @ 09:36)    Ferritin, Serum: 734 ng/mL (01-01-22 @ 12:31)  Ferritin, Serum: 1410 ng/mL (12-29-21 @ 00:19)  Ferritin, Serum: 1173 ng/mL (12-28-21 @ 12:24)  Ferritin, Serum: 1024 ng/mL (12-27-21 @ 12:15)    D-Dimer Assay, Quantitative: 738 ng/mL DDU (12-28-21 @ 22:45)  D-Dimer Assay, Quantitative: 217 ng/mL DDU (12-27-21 @ 09:36)    Creatine Kinase, Serum: 1319 U/L (12-28-21 @ 22:45)    Lactate, Blood: 2.0 mmol/L (12-27-21 @ 09:37)    Prothrombin Time, Plasma: 13.4 sec (12-27-21 @ 09:36)    MICROBIOLOGY: reviewed  Culture - Urine (collected 12-28-21 @ 05:32)  Source: Clean Catch Clean Catch (Midstream)  Final Report (12-29-21 @ 13:41):    No growth    Culture - Blood (collected 12-28-21 @ 00:33)  Source: .Blood Blood-Peripheral  Final Report (01-02-22 @ 01:00):    No Growth Final    Culture - Blood (collected 12-28-21 @ 00:33)  Source: .Blood Blood-Peripheral  Final Report (01-02-22 @ 01:00):    No Growth Final    RADIOLOGY & ADDITIONAL STUDIES: reviewed

## 2022-01-04 LAB
ALBUMIN SERPL ELPH-MCNC: 2.3 G/DL — LOW (ref 3.3–5)
ALP SERPL-CCNC: 81 U/L — SIGNIFICANT CHANGE UP (ref 40–120)
ALT FLD-CCNC: 33 U/L — SIGNIFICANT CHANGE UP (ref 12–78)
ANISOCYTOSIS BLD QL: SLIGHT — SIGNIFICANT CHANGE UP
AST SERPL-CCNC: 53 U/L — HIGH (ref 15–37)
BASOPHILS # BLD AUTO: 0 K/UL — SIGNIFICANT CHANGE UP (ref 0–0.2)
BASOPHILS NFR BLD AUTO: 0 % — SIGNIFICANT CHANGE UP (ref 0–2)
BILIRUB DIRECT SERPL-MCNC: 0.1 MG/DL — SIGNIFICANT CHANGE UP (ref 0–0.3)
BILIRUB INDIRECT FLD-MCNC: 0.3 MG/DL — SIGNIFICANT CHANGE UP (ref 0.2–1)
BILIRUB SERPL-MCNC: 0.4 MG/DL — SIGNIFICANT CHANGE UP (ref 0.2–1.2)
CREAT SERPL-MCNC: 1 MG/DL — SIGNIFICANT CHANGE UP (ref 0.5–1.3)
EOSINOPHIL # BLD AUTO: 0 K/UL — SIGNIFICANT CHANGE UP (ref 0–0.5)
EOSINOPHIL NFR BLD AUTO: 0 % — SIGNIFICANT CHANGE UP (ref 0–6)
LG PLATELETS BLD QL AUTO: SLIGHT — SIGNIFICANT CHANGE UP
LYMPHOCYTES # BLD AUTO: 1.78 K/UL — SIGNIFICANT CHANGE UP (ref 1–3.3)
LYMPHOCYTES # BLD AUTO: 19 % — SIGNIFICANT CHANGE UP (ref 13–44)
MANUAL SMEAR VERIFICATION: SIGNIFICANT CHANGE UP
METAMYELOCYTES # FLD: 1 % — HIGH (ref 0–0)
MONOCYTES # BLD AUTO: 0.66 K/UL — SIGNIFICANT CHANGE UP (ref 0–0.9)
MONOCYTES NFR BLD AUTO: 7 % — SIGNIFICANT CHANGE UP (ref 2–14)
NEUTROPHILS # BLD AUTO: 6.19 K/UL — SIGNIFICANT CHANGE UP (ref 1.8–7.4)
NEUTROPHILS NFR BLD AUTO: 60 % — SIGNIFICANT CHANGE UP (ref 43–77)
NEUTS BAND # BLD: 6 % — SIGNIFICANT CHANGE UP (ref 0–8)
NRBC # BLD: 0 — SIGNIFICANT CHANGE UP
NRBC # BLD: SIGNIFICANT CHANGE UP /100 WBCS (ref 0–0)
PLAT MORPH BLD: NORMAL — SIGNIFICANT CHANGE UP
PLATELET # BLD AUTO: 596 K/UL — HIGH (ref 150–400)
POIKILOCYTOSIS BLD QL AUTO: SLIGHT — SIGNIFICANT CHANGE UP
PROT SERPL-MCNC: 8 G/DL — SIGNIFICANT CHANGE UP (ref 6–8.3)
RBC BLD AUTO: SIGNIFICANT CHANGE UP
SARS-COV-2 RNA SPEC QL NAA+PROBE: DETECTED
VARIANT LYMPHS # BLD: 7 % — HIGH (ref 0–6)

## 2022-01-04 PROCEDURE — 93880 EXTRACRANIAL BILAT STUDY: CPT | Mod: 26

## 2022-01-04 RX ADMIN — PANTOPRAZOLE SODIUM 40 MILLIGRAM(S): 20 TABLET, DELAYED RELEASE ORAL at 05:29

## 2022-01-04 RX ADMIN — Medication 400 MILLIGRAM(S): at 05:30

## 2022-01-04 RX ADMIN — Medication 2000 UNIT(S): at 17:16

## 2022-01-04 RX ADMIN — Medication 100 MICROGRAM(S): at 05:29

## 2022-01-04 RX ADMIN — Medication 400 MILLIGRAM(S): at 17:16

## 2022-01-04 RX ADMIN — ZINC SULFATE TAB 220 MG (50 MG ZINC EQUIVALENT) 220 MILLIGRAM(S): 220 (50 ZN) TAB at 11:44

## 2022-01-04 RX ADMIN — APIXABAN 5 MILLIGRAM(S): 2.5 TABLET, FILM COATED ORAL at 17:16

## 2022-01-04 RX ADMIN — Medication 1000 MILLIGRAM(S): at 17:16

## 2022-01-04 RX ADMIN — DIVALPROEX SODIUM 750 MILLIGRAM(S): 500 TABLET, DELAYED RELEASE ORAL at 11:44

## 2022-01-04 RX ADMIN — Medication 25 MILLIGRAM(S): at 17:17

## 2022-01-04 RX ADMIN — Medication 1000 MILLIGRAM(S): at 05:29

## 2022-01-04 RX ADMIN — Medication 500 MILLIGRAM(S): at 11:44

## 2022-01-04 RX ADMIN — APIXABAN 5 MILLIGRAM(S): 2.5 TABLET, FILM COATED ORAL at 05:30

## 2022-01-04 RX ADMIN — Medication 88 MICROGRAM(S): at 05:29

## 2022-01-04 RX ADMIN — Medication 1 TABLET(S): at 11:44

## 2022-01-04 RX ADMIN — Medication 2000 UNIT(S): at 05:30

## 2022-01-04 RX ADMIN — ARIPIPRAZOLE 25 MILLIGRAM(S): 15 TABLET ORAL at 11:44

## 2022-01-04 RX ADMIN — Medication 25 MILLIGRAM(S): at 05:29

## 2022-01-04 RX ADMIN — Medication 6 MILLIGRAM(S): at 05:29

## 2022-01-04 NOTE — PROGRESS NOTE ADULT - SUBJECTIVE AND OBJECTIVE BOX
CHAD DICKEY    PLV 2NOR 242 W1    Allergies    No Known Allergies    Intolerances        PAST MEDICAL & SURGICAL HISTORY:  Hypothyroid        FAMILY HISTORY:      Home Medications:  ARIPiprazole 10 mg oral tablet: 0.5 tab(s) orally once a day (in the morning)      *Takes along with 20mg for a TDD: 25mg * (2021 10:12)  ARIPiprazole 20 mg oral tablet: 1 tab(s) orally once a day (in the morning)    *Takes along with 5mg for a TDD: 25mg* (2021 10:12)  Calcium 600+D oral tablet: 1 tab(s) orally 2 times a day (2021 10:12)  carBAMazepine 400 mg oral tablet, extended release: 1 tab(s) orally 2 times a day (2021 10:12)  Cetaphil topical lotion: Apply topically to affected area 2 times a day (2021 10:12)  divalproex sodium 250 mg oral delayed release tablet: 1 tab(s) orally once a day (in the morning)    *Takes along with 500mg for a TDD in the morninmg  (2021 10:12)  divalproex sodium 500 mg oral delayed release tablet: 2 tab(s) orally 2 times a day at 3pm and 8pm  (2021 10:12)  Fish Oil 1000 mg oral capsule: 1 cap(s) orally once a day (in the morning) go back to usual home dose (15 Raymond 2021 19:47)  levothyroxine 100 mcg (0.1 mg) oral tablet: 1 tab(s) orally once a day     *Takes with 88mcg (1hr apart) for a TDD; 188mcg* follow home protocol (15 Raymond 2021 19:47)  levothyroxine 88 mcg (0.088 mg) oral tablet: 1 tab(s) orally once a day    *Takes with 100mcg (1hr apart) for a TDD; 188mcg * follow home protocol (15 Raymond 2021 19:47)  OLANZapine 20 mg oral tablet: 0.5 tab(s) orally once a day (in the evening) (2021 10:12)  oxybutynin 10 mg/24 hr oral tablet, extended release: 1 tab(s) orally once a day (2021 10:12)  tamsulosin 0.4 mg oral capsule: 1 cap(s) orally once a day (at bedtime) (2021 10:12)  Vitamin D3 1000 intl units (25 mcg) oral tablet:  (2021 10:12)      MEDICATIONS  (STANDING):  apixaban 5 milliGRAM(s) Oral every 12 hours  ARIPiprazole 25 milliGRAM(s) Oral daily  ascorbic acid 500 milliGRAM(s) Oral daily  carBAMazepine ER Tablet 400 milliGRAM(s) Oral two times a day  cholecalciferol 2000 Unit(s) Oral two times a day  dexAMETHasone  Injectable 6 milliGRAM(s) IV Push daily  diVALproex  milliGRAM(s) Oral daily  levothyroxine 88 MICROGram(s) Oral daily  levothyroxine 100 MICROGram(s) Oral daily  metoprolol tartrate 25 milliGRAM(s) Oral two times a day  multivitamin 1 Tablet(s) Oral daily  pantoprazole    Tablet 40 milliGRAM(s) Oral before breakfast  valproic acid 1000 milliGRAM(s) Oral two times a day  zinc sulfate 220 milliGRAM(s) Oral daily    MEDICATIONS  (PRN):  acetaminophen     Tablet .. 650 milliGRAM(s) Oral every 6 hours PRN Temp greater or equal to 38C (100.4F)              Vital Signs Last 24 Hrs  T(C): 36.9 (2022 04:39), Max: 36.9 (2022 04:39)  T(F): 98.4 (2022 04:39), Max: 98.4 (2022 04:39)  HR: 66 (2022 04:39) (66 - 75)  BP: 115/74 (2022 04:39) (108/68 - 119/75)  BP(mean): --  RR: 18 (2022 04:39) (18 - 18)  SpO2: 94% (2022 04:39) (90% - 94%)              LABS:                        14.9   9.38  )-----------( 596      ( 2022 22:11 )             45.9     01-03    142  |  104  |  19  ----------------------------<  96  4.8   |  29  |  0.98    Ca    9.1      2022 22:11    TPro  8.6<H>  /  Alb  2.5<L>  /  TBili  0.3  /  DBili  x   /  AST  59<H>  /  ALT  38  /  AlkPhos  88                WBC:  WBC Count: 9.38 K/uL ( @ 22:11)  WBC Count: 7.26 K/uL ( @ 09:20)      MICROBIOLOGY:  RECENT CULTURES:                  Sodium:  Sodium, Serum: 142 mmol/L ( @ 22:11)  Sodium, Serum: 142 mmol/L ( @ 09:20)      0.98 mg/dL :11  0.91 mg/dL  11:02  0.87 mg/dL :59  0.81 mg/dL  @ :20      Hemoglobin:  Hemoglobin: 14.9 g/dL ( @ 22:11)  Hemoglobin: 12.9 g/dL ( @ 09:20)      Platelets: Platelet Count - Automated: 596 K/uL ( @ 22:11)  Platelet Count - Automated: 365 K/uL ( @ 09:20)      LIVER FUNCTIONS - ( 2022 22:11 )  Alb: 2.5 g/dL / Pro: 8.6 g/dL / ALK PHOS: 88 U/L / ALT: 38 U/L / AST: 59 U/L / GGT: x                 RADIOLOGY & ADDITIONAL STUDIES:      MICROBIOLOGY:  RECENT CULTURES:

## 2022-01-04 NOTE — PROGRESS NOTE ADULT - ASSESSMENT
NOBLE BONILLA 60 M [facility] P 12/26/2021 1960 DR GALEN ROSA     REVIEW OF SYMPTOMS      Able to give ROS  Yes     RELIABLE +/-   CONSTITUTIONAL Weakness Yes  Chills No   ENDOCRINE  No heat or cold intolerance    ALLERGY No hives  Sore throat No stridor  RESP Coughing blood no  Shortness of breath YES   NEURO No Headache  Confusion Pain neck No   CARDIAC No Chest pain No Palpitations   GI  Pain abdomen NO   Vomiting NO     PHYSICAL EXAM    HEENT Unremarkable  atraumatic   RESP Fair air entry EXP prolonged    Harsh breath sound Resp distres mild   CARDIAC S1 S2 No S3     NO JVD    ABDOMEN SOFT BS PRESENT NOT DISTENDED No hepatosplenomegaly PEDAL EDEMA present No calf tenderness  NO rash       PATIENT PRESENTATION.                                       60 male pmhx of bipolar disorder, mild intellectual developmental disability, anxiety disorder, hypothyroidism, HLD, Vit D deficiency, GERD, osteopenia, BPH, IBS, lumbago, and eczema   was dagnosed with covid 1 week ago and admitted 12/26/2021 with fever cough weakness    Pulm consulted 12/26/2021                        ____  DOA CC.  12/26/2021 COVID diagn 12/13  Fever cough  _____                            HOSPITAL COURSE.  COVID poa 12/26/2021   DEXA 12/26  RDVZ 12/27/2021    OXYGENATION FAILUR    BACT INFECTN   12/26/2021 zosyn    HYPOTHYROID pmh  NEURO PROBLEMS pmh  12/26/2021 aripiprazole 25  12/26/2021 carbamazepine 400.2   12/26/2021 depakot 1000.2           ASSESSMENT/RECOMMENDATIONS.    RESP.   Monitor po Target po 90-95%    HEMODYNAMICS.   Monitor bp Target MAP 65 (+)  echo 12/30 n lvsf    HYTN  12/28 metoprolol 25.2     OXYGENATION STATUS.  1/4/2022 6l 93%    RO VTE.  D-dimr 12/28 d-di 738  V duplx 12/27/2021 v duplx (-)    INFECTION.   Surveillance for bacterial infection.  No ongoing bacterial infection suspected    COVID.  Scv2 12/26/2021 (+)  Ferrn 12/27-11/28/2021 ferrn 1024 - 1173   Cr 12/26-12/27-12/28/2021 Cr 1.4 - 1.1-1.3  LFTS 12/26/2021  AP. 149  AST. 39  ALT. 53  ct 12/27/2021 ct ch periph bl opac   remdesevir 12/27/2021 remdesev  Dexa 12/26/2021 dexa 6 x 10d  a/r Monitor inflammatory markers  Monitor thrombotic markers   Monitor oxygenation    TIME SPENT   Over 25 minutes aggregate care time spent on encounter; activities included   direct patient care, counseling and/or coordinating care reviewing notes, lab data/ imaging , discussion with multidisciplinary team/ patient  /family and explaining in detail risks, benefits, alternatives  of the recommendations     NOBLE BONILLA 60 M [facility] P 12/26/2021 1960 DR GALEN ROSA

## 2022-01-04 NOTE — PROGRESS NOTE ADULT - ASSESSMENT
Patient is a 60 year old male with PMH of bipolar disorder, intellectual developmental disability, anxiety, hypothyroidism, HLD, BPH, eczema who was diagnosed with COVID 1 week ago and was brought in for fever, cough, weakness and body aches and admitted for hypoxia due to COVID pneumonia.   +COVID on admission, negative flu  CXR reviewed, b/l infiltrates  CT chest imaging reviewed, c/w COVID  Vaccinated with Pfizer x2  NLR on admission - 4.51/0.99= 4.6    Recommendations:  12/27 - afebrile, on NC 1.5L, NLR -6.37/0.51 = 12.5. Started on dexamethasone and remdesivir. Started on pip-tazo for possible bacterial pna, will check for ferritin/pct, if ratio >1000 then would d/c pip-tazo. On heparin for vte ppx. Prone as tolerated. Monitor CBC with diff, inflammatory markers.    12/28 - febrile last night likely d/t COVID, remains on NC, continue remdesivir x5d, dexamethasone x10d. FPR ~3600 - low suspicion for superimposed bacterial pneumonia. UA negative. Follow blood cultures - if remains negative x48h - discontinue pip-tazo. Supplemental O2 as needed, wean as tolerated.    12/29 - NLR 6.58/0.65= 10, on NC 6L, fevers likely due to COVID. Continue remdesivir, dexamethasone. FPR remains >1000. Bcx NGTD x2 - if remains negative x48h, can d/c pip-tazo.   12/30 - on NC 6L, fever curve better Tm 100F in last 24h. Continue remdesivir until 12/31. Continue dexamethasone x10d. Blood and urine cultures negative - will discontinue pip-tazo. Continue supportive care. Monitor CBC, inflammatory markers, LFTs.   12/31 - c/w on supplemental O2. Continue remdesivir until 12/31. Continue dexamethasone x10d. Blood and urine cultures negative - will d/c pip-tazo. Continue supportive care.   1/1 - c/w supplemental O2. S/p remdesivir. C/w steroids.   1/2 - c/w supplemental O2. S/p remdesivir. C/w steroids x10 day course.   1/3 - remains on NC 6L, wean as tolerated, cont dexamethasone x10d. Continue supportive care   1/4 - remains on NC 6L, continue dexamethasone x10d - last day today, continue supportive care       Lou Tilley M.D.  [facility], Division of Infectious Diseases  [phone]  After 5pm on weekdays and all day on weekends - please call [phone]

## 2022-01-04 NOTE — PROGRESS NOTE ADULT - SUBJECTIVE AND OBJECTIVE BOX
[facility] DIVISION of INFECTIOUS DISEASE  Rickey Guzman MD PhD, Tara Mcdonald MD, Calista Espinoza MD, Lou Tilley MD, John Moss MD  and providing coverage with Joy Markham MD and Derik Hooker MD  Providing Infectious Disease Consultations at [facility], [facility]'s      Office# [phone] to schedule follow up appointments  Answering Service for urgent calls or New Consults [phone]    Infectious Diseases Progress Note:    CHAD DICKEY is a 61y year old Male patient    COVID-19 Patient  Notes reviewed  No concerning events overnight.   Allergies: No Known Allergies    ANTIBIOTICS/RELEVANT:  Antimicrobials   Immunologic:   Other Meds: acetaminophen     Tablet .. 650 milliGRAM(s) Oral every 6 hours PRN  apixaban 5 milliGRAM(s) Oral every 12 hours  ARIPiprazole 25 milliGRAM(s) Oral daily  ascorbic acid 500 milliGRAM(s) Oral daily  carBAMazepine ER Tablet 400 milliGRAM(s) Oral two times a day  cholecalciferol 2000 Unit(s) Oral two times a day  dexAMETHasone  Injectable 6 milliGRAM(s) IV Push daily  diVALproex  milliGRAM(s) Oral daily  levothyroxine 88 MICROGram(s) Oral daily  levothyroxine 100 MICROGram(s) Oral daily  metoprolol tartrate 25 milliGRAM(s) Oral two times a day  multivitamin 1 Tablet(s) Oral daily  pantoprazole    Tablet 40 milliGRAM(s) Oral before breakfast  valproic acid 1000 milliGRAM(s) Oral two times a day  zinc sulfate 220 milliGRAM(s) Oral daily    Objective:  Vital Signs Last 24 Hrs  T(F): 98.4 (04 Jan 2022 04:39), Max: 98.4 (04 Jan 2022 04:39)  HR: 66 (04 Jan 2022 04:39) (66 - 75)  BP: 115/74 (04 Jan 2022 04:39) (108/68 - 119/75)  RR: 18 (04 Jan 2022 04:39) (18 - 18)  SpO2: 94% (04 Jan 2022 04:39) (90% - 94%)  PHYSICAL EXAM:  HEENT: NC/AT, anicteric, supple  Respiratory: no acc muscle use, breathing comfortably  Cardiovascular: S1 S2 present, normal rate  Gastrointestinal: normal appearing, nondistended  Extremities: no edema, no cyanosis    LABS:                        14.9   9.38  )-----------( 596      ( 03 Jan 2022 22:11 )             45.9     WBC trend:  9.38 01-03 @ 22:11  7.26 01-01 @ 09:20  6.29 12-30 @ 09:56  8.12 12-29 @ 11:25  7.32 12-28 @ 14:29    01-04    x   |  x   |  x   ----------------------------<  x   x    |  x   |  1.00    Ca    9.1      03 Jan 2022 22:11    TPro  8.0  /  Alb  2.3<L>  /  TBili  0.4  /  DBili  0.1  /  AST  53<H>  /  ALT  33  /  AlkPhos  81  01-04    Creatinine, Serum: 1.00 mg/dL (01-04-22 @ 10:18)  Creatinine, Serum: 0.98 mg/dL (01-03-22 @ 22:11)  Creatinine, Serum: 0.91 mg/dL (01-03-22 @ 11:02)  Creatinine, Serum: 0.87 mg/dL (01-02-22 @ 09:59)  Creatinine, Serum: 0.81 mg/dL (01-01-22 @ 09:20)  Creatinine, Serum: 1.00 mg/dL (12-30-21 @ 09:56)  Creatinine, Serum: 0.99 mg/dL (12-30-21 @ 06:59)  Creatinine, Serum: 1.10 mg/dL (12-29-21 @ 11:25)  Creatinine, Serum: 1.10 mg/dL (12-29-21 @ 08:55)  Creatinine, Serum: 1.30 mg/dL (12-28-21 @ 14:29)    Auto Neutrophil #: 6.19 K/uL (01-03-22 @ 22:11)  Auto Neutrophil #: 5.16 K/uL (12-30-21 @ 09:56)  Auto Neutrophil #: 6.58 K/uL (12-29-21 @ 11:25)  Auto Neutrophil #: 6.23 K/uL (12-28-21 @ 14:29)  Auto Neutrophil #: 6.37 K/uL (12-27-21 @ 09:36)  Auto Neutrophil #: 4.51 K/uL (12-26-21 @ 20:50)    Auto Lymphocyte #: 1.78 K/uL (01-03-22 @ 22:11)  Auto Lymphocyte #: 0.44 K/uL (12-30-21 @ 09:56)  Auto Lymphocyte #: 0.65 K/uL (12-29-21 @ 11:25)  Auto Lymphocyte #: 0.61 K/uL (12-28-21 @ 14:29)  Auto Lymphocyte #: 0.51 K/uL (12-27-21 @ 09:36)  Auto Lymphocyte #: 0.99 K/uL (12-26-21 @ 20:50)    Procalcitonin, Serum: 0.18 ng/mL (01-01-22 @ 09:20)  Procalcitonin, Serum: 0.49 ng/mL (12-28-21 @ 07:43)  Procalcitonin, Serum: 0.28 ng/mL (12-27-21 @ 09:36)    Ferritin, Serum: 734 ng/mL (01-01-22 @ 12:31)  Ferritin, Serum: 1410 ng/mL (12-29-21 @ 00:19)  Ferritin, Serum: 1173 ng/mL (12-28-21 @ 12:24)  Ferritin, Serum: 1024 ng/mL (12-27-21 @ 12:15)    D-Dimer Assay, Quantitative: 738 ng/mL DDU (12-28-21 @ 22:45)  D-Dimer Assay, Quantitative: 217 ng/mL DDU (12-27-21 @ 09:36)    MICROBIOLOGY: reviewed    RADIOLOGY & ADDITIONAL STUDIES: reviewed

## 2022-01-05 LAB
ALBUMIN SERPL ELPH-MCNC: 2.3 G/DL — LOW (ref 3.3–5)
ALBUMIN SERPL ELPH-MCNC: 2.4 G/DL — LOW (ref 3.3–5)
ALP SERPL-CCNC: 80 U/L — SIGNIFICANT CHANGE UP (ref 40–120)
ALP SERPL-CCNC: 81 U/L — SIGNIFICANT CHANGE UP (ref 40–120)
ALT FLD-CCNC: 30 U/L — SIGNIFICANT CHANGE UP (ref 12–78)
ALT FLD-CCNC: 33 U/L — SIGNIFICANT CHANGE UP (ref 12–78)
ANION GAP SERPL CALC-SCNC: 9 MMOL/L — SIGNIFICANT CHANGE UP (ref 5–17)
AST SERPL-CCNC: 45 U/L — HIGH (ref 15–37)
AST SERPL-CCNC: 58 U/L — HIGH (ref 15–37)
BILIRUB DIRECT SERPL-MCNC: 0.1 MG/DL — SIGNIFICANT CHANGE UP (ref 0–0.3)
BILIRUB INDIRECT FLD-MCNC: 0.3 MG/DL — SIGNIFICANT CHANGE UP (ref 0.2–1)
BILIRUB SERPL-MCNC: 0.3 MG/DL — SIGNIFICANT CHANGE UP (ref 0.2–1.2)
BILIRUB SERPL-MCNC: 0.4 MG/DL — SIGNIFICANT CHANGE UP (ref 0.2–1.2)
BUN SERPL-MCNC: 20 MG/DL — SIGNIFICANT CHANGE UP (ref 7–23)
CALCIUM SERPL-MCNC: 8.8 MG/DL — SIGNIFICANT CHANGE UP (ref 8.5–10.1)
CHLORIDE SERPL-SCNC: 107 MMOL/L — SIGNIFICANT CHANGE UP (ref 96–108)
CHOLEST SERPL-MCNC: 124 MG/DL — SIGNIFICANT CHANGE UP
CO2 SERPL-SCNC: 24 MMOL/L — SIGNIFICANT CHANGE UP (ref 22–31)
CREAT SERPL-MCNC: 0.94 MG/DL — SIGNIFICANT CHANGE UP (ref 0.5–1.3)
CREAT SERPL-MCNC: 1.1 MG/DL — SIGNIFICANT CHANGE UP (ref 0.5–1.3)
GLUCOSE SERPL-MCNC: 147 MG/DL — HIGH (ref 70–99)
HDLC SERPL-MCNC: 20 MG/DL — LOW
LIPID PNL WITH DIRECT LDL SERPL: 72 MG/DL — SIGNIFICANT CHANGE UP
NON HDL CHOLESTEROL: 105 MG/DL — SIGNIFICANT CHANGE UP
POTASSIUM SERPL-MCNC: 4.6 MMOL/L — SIGNIFICANT CHANGE UP (ref 3.5–5.3)
POTASSIUM SERPL-SCNC: 4.6 MMOL/L — SIGNIFICANT CHANGE UP (ref 3.5–5.3)
PROT SERPL-MCNC: 7.9 G/DL — SIGNIFICANT CHANGE UP (ref 6–8.3)
PROT SERPL-MCNC: 8 G/DL — SIGNIFICANT CHANGE UP (ref 6–8.3)
SODIUM SERPL-SCNC: 140 MMOL/L — SIGNIFICANT CHANGE UP (ref 135–145)
TRIGL SERPL-MCNC: 163 MG/DL — HIGH

## 2022-01-05 PROCEDURE — 70450 CT HEAD/BRAIN W/O DYE: CPT | Mod: 26

## 2022-01-05 RX ORDER — ARIPIPRAZOLE 15 MG/1
1 TABLET ORAL
Qty: 0 | Refills: 0 | DISCHARGE

## 2022-01-05 RX ORDER — DIVALPROEX SODIUM 500 MG/1
3 TABLET, DELAYED RELEASE ORAL
Qty: 0 | Refills: 0 | DISCHARGE
Start: 2022-01-05

## 2022-01-05 RX ORDER — CHOLECALCIFEROL (VITAMIN D3) 125 MCG
0 CAPSULE ORAL
Qty: 0 | Refills: 0 | DISCHARGE

## 2022-01-05 RX ORDER — DIVALPROEX SODIUM 500 MG/1
1 TABLET, DELAYED RELEASE ORAL
Qty: 0 | Refills: 0 | DISCHARGE

## 2022-01-05 RX ORDER — DRONABINOL 2.5 MG
2.5 CAPSULE ORAL
Refills: 0 | Status: DISCONTINUED | OUTPATIENT
Start: 2022-01-05 | End: 2022-01-07

## 2022-01-05 RX ORDER — OLANZAPINE 15 MG/1
0.5 TABLET, FILM COATED ORAL
Qty: 0 | Refills: 0 | DISCHARGE

## 2022-01-05 RX ORDER — ARIPIPRAZOLE 15 MG/1
5 TABLET ORAL
Qty: 0 | Refills: 0 | DISCHARGE
Start: 2022-01-05

## 2022-01-05 RX ORDER — ARIPIPRAZOLE 15 MG/1
0.5 TABLET ORAL
Qty: 0 | Refills: 0 | DISCHARGE

## 2022-01-05 RX ORDER — TAMSULOSIN HYDROCHLORIDE 0.4 MG/1
1 CAPSULE ORAL
Qty: 0 | Refills: 0 | DISCHARGE

## 2022-01-05 RX ORDER — METOPROLOL TARTRATE 50 MG
1 TABLET ORAL
Qty: 0 | Refills: 0 | DISCHARGE
Start: 2022-01-05

## 2022-01-05 RX ORDER — ZINC SULFATE TAB 220 MG (50 MG ZINC EQUIVALENT) 220 (50 ZN) MG
1 TAB ORAL
Qty: 0 | Refills: 0 | DISCHARGE
Start: 2022-01-05

## 2022-01-05 RX ORDER — APIXABAN 2.5 MG/1
1 TABLET, FILM COATED ORAL
Qty: 0 | Refills: 0 | DISCHARGE
Start: 2022-01-05

## 2022-01-05 RX ORDER — PANTOPRAZOLE SODIUM 20 MG/1
1 TABLET, DELAYED RELEASE ORAL
Qty: 0 | Refills: 0 | DISCHARGE
Start: 2022-01-05

## 2022-01-05 RX ORDER — DRONABINOL 2.5 MG
1 CAPSULE ORAL
Qty: 0 | Refills: 0 | DISCHARGE
Start: 2022-01-05

## 2022-01-05 RX ORDER — DIVALPROEX SODIUM 500 MG/1
2 TABLET, DELAYED RELEASE ORAL
Qty: 0 | Refills: 0 | DISCHARGE

## 2022-01-05 RX ORDER — ASCORBIC ACID 60 MG
1 TABLET,CHEWABLE ORAL
Qty: 0 | Refills: 0 | DISCHARGE
Start: 2022-01-05

## 2022-01-05 RX ORDER — VALPROIC ACID (AS SODIUM SALT) 250 MG/5ML
4 SOLUTION, ORAL ORAL
Qty: 0 | Refills: 0 | DISCHARGE
Start: 2022-01-05

## 2022-01-05 RX ORDER — CHOLECALCIFEROL (VITAMIN D3) 125 MCG
2000 CAPSULE ORAL
Qty: 0 | Refills: 0 | DISCHARGE
Start: 2022-01-05

## 2022-01-05 RX ADMIN — Medication 6 MILLIGRAM(S): at 05:59

## 2022-01-05 RX ADMIN — Medication 500 MILLIGRAM(S): at 11:39

## 2022-01-05 RX ADMIN — APIXABAN 5 MILLIGRAM(S): 2.5 TABLET, FILM COATED ORAL at 05:59

## 2022-01-05 RX ADMIN — Medication 2.5 MILLIGRAM(S): at 17:22

## 2022-01-05 RX ADMIN — APIXABAN 5 MILLIGRAM(S): 2.5 TABLET, FILM COATED ORAL at 17:21

## 2022-01-05 RX ADMIN — Medication 100 MICROGRAM(S): at 05:59

## 2022-01-05 RX ADMIN — Medication 1 TABLET(S): at 11:38

## 2022-01-05 RX ADMIN — Medication 88 MICROGRAM(S): at 05:59

## 2022-01-05 RX ADMIN — PANTOPRAZOLE SODIUM 40 MILLIGRAM(S): 20 TABLET, DELAYED RELEASE ORAL at 05:59

## 2022-01-05 RX ADMIN — DIVALPROEX SODIUM 750 MILLIGRAM(S): 500 TABLET, DELAYED RELEASE ORAL at 11:38

## 2022-01-05 RX ADMIN — Medication 1000 MILLIGRAM(S): at 17:21

## 2022-01-05 RX ADMIN — Medication 2000 UNIT(S): at 05:59

## 2022-01-05 RX ADMIN — Medication 25 MILLIGRAM(S): at 17:21

## 2022-01-05 RX ADMIN — Medication 400 MILLIGRAM(S): at 05:59

## 2022-01-05 RX ADMIN — ZINC SULFATE TAB 220 MG (50 MG ZINC EQUIVALENT) 220 MILLIGRAM(S): 220 (50 ZN) TAB at 11:38

## 2022-01-05 RX ADMIN — Medication 2000 UNIT(S): at 17:21

## 2022-01-05 RX ADMIN — Medication 1000 MILLIGRAM(S): at 05:59

## 2022-01-05 RX ADMIN — Medication 400 MILLIGRAM(S): at 17:21

## 2022-01-05 RX ADMIN — Medication 25 MILLIGRAM(S): at 05:59

## 2022-01-05 RX ADMIN — ARIPIPRAZOLE 25 MILLIGRAM(S): 15 TABLET ORAL at 11:39

## 2022-01-05 NOTE — DISCHARGE NOTE PROVIDER - NSDCMRMEDTOKEN_GEN_ALL_CORE_FT
apixaban 5 mg oral tablet: 1 tab(s) orally every 12 hours  ARIPiprazole 5 mg oral tablet: 5 tab(s) orally once a day  ascorbic acid 500 mg oral tablet: 1 tab(s) orally once a day  Calcium 600+D oral tablet: 1 tab(s) orally 2 times a day  carBAMazepine 400 mg oral tablet, extended release: 1 tab(s) orally 2 times a day  Cetaphil topical lotion: Apply topically to affected area 2 times a day  cholecalciferol oral tablet: 2000 unit(s) orally 2 times a day  divalproex sodium 250 mg oral tablet, extended release: 3 tab(s) orally once a day  dronabinol 2.5 mg oral capsule: 1 cap(s) orally 2 times a day  Fish Oil 1000 mg oral capsule: 1 cap(s) orally once a day (in the morning) go back to usual home dose  levothyroxine 100 mcg (0.1 mg) oral tablet: 1 tab(s) orally once a day     *Takes with 88mcg (1hr apart) for a TDD; 188mcg* follow home protocol  levothyroxine 88 mcg (0.088 mg) oral tablet: 1 tab(s) orally once a day    *Takes with 100mcg (1hr apart) for a TDD; 188mcg * follow home protocol  metoprolol tartrate 25 mg oral tablet: 1 tab(s) orally 2 times a day hsbp less 120,,,hhr less 60  Multiple Vitamins oral tablet: 1 tab(s) orally once a day  oxybutynin 10 mg/24 hr oral tablet, extended release: 1 tab(s) orally once a day  pantoprazole 40 mg oral delayed release tablet: 1 tab(s) orally once a day (before a meal)  tamsulosin 0.4 mg oral capsule: 1 cap(s) orally once a day (at bedtime) hsbp less 120  valproic acid 250 mg oral capsule: 4 cap(s) orally 2 times a day  zinc sulfate 220 mg oral capsule: 1 cap(s) orally once a day

## 2022-01-05 NOTE — PROGRESS NOTE ADULT - ASSESSMENT
NOBLE BONILLA 60 M [facility] P 12/26/2021 1960 DR GALEN ROSA     REVIEW OF SYMPTOMS      Able to give ROS  Yes     RELIABLE +/-   CONSTITUTIONAL Weakness Yes  Chills No   ENDOCRINE  No heat or cold intolerance    ALLERGY No hives  Sore throat No stridor  RESP Coughing blood no  Shortness of breath YES   NEURO No Headache  Confusion Pain neck No   CARDIAC No Chest pain No Palpitations   GI  Pain abdomen NO   Vomiting NO     PHYSICAL EXAM    HEENT Unremarkable  atraumatic   RESP Fair air entry EXP prolonged    Harsh breath sound Resp distres mild   CARDIAC S1 S2 No S3     NO JVD    ABDOMEN SOFT BS PRESENT NOT DISTENDED No hepatosplenomegaly PEDAL EDEMA present No calf tenderness  NO rash     ____  DOA CC.  12/26/2021 COVID diagn 12/13  Fever cough  _____                            HOSPITAL COURSE.  COVID poa 12/26/2021   DEXA 12/26  RDVZ 12/27/2021    OXYGENATION FAILUR    BACT INFECTN   12/26/2021 zosyn    HYPOTHYROID pmh  _____                            GOC. 12/26/2021 Full code   COVID STATUS. 12/26/2021 scv2 (+)   ICU STAY. none    PMH.  PMH GERD   PMH BPH   PMH BIPOLAR  PMH MILD INTELLECTUAL DISABILITY       BEST PRACTICE ISSUES.                                                  HEAD OF BED ELEVATION. Yes  DVT PROPHYLAXIS.     12/29 apixa 5/.2 (a f)   12/27/2021 hpsc   12/26/2021 lvnx 40   dced                                  STEIN PROPHYLAXIS.  12/26/2021 protonix 40                                                                                       DIET.     12/26/2021 puree                      INFECTION PROPHYLAXIS.    SPEECH SWALLOW RECOMMENDATIONS.  12/28/2021 soft bite    IV FLUIDS.   12/28 ns 50      ASSESSMENT/RECOMMENDATIONS.    RESP.   Monitor po Target po 90-95%    HEMODYNAMICS.   Monitor bp Target MAP 65 (+)  echo 12/30 n lvsf    HYTN  12/28 metoprolol 25.2     OXYGENATION STATUS.  1/5/2022 ra 98%    RO VTE.  D-dimr 12/28 d-di 738  V duplx 12/27/2021 v duplx (-)    INFECTION.   Surveillance for bacterial infection.  No ongoing bacterial infection suspected    COVID.  Scv2 12/26/2021 (+)  Ferrn 12/27-11/28/2021 ferrn 1024 - 1173   Cr 12/26-12/27-12/28/2021 Cr 1.4 - 1.1-1.3  LFTS 12/26/2021  AP. 149  AST. 39  ALT. 53  ct 12/27/2021 ct ch periph bl opac   remdesevir 12/27/2021 remdesev  Dexa 12/26/2021 dexa 6 x 10d  a/r Monitor inflammatory markers  Monitor thrombotic markers   Monitor oxygenation    TIME SPENT   Over 25 minutes aggregate care time spent on encounter; activities included   direct patient care, counseling and/or coordinating care reviewing notes, lab data/ imaging , discussion with multidisciplinary team/ patient  /family and explaining in detail risks, benefits, alternatives  of the recommendations     NOBLE BONILLA 60 M [facility] P 12/26/2021 1960 DR GALEN ROSA

## 2022-01-05 NOTE — PROGRESS NOTE ADULT - SUBJECTIVE AND OBJECTIVE BOX
CHAD DICKEY    PLV 2NOR 242 W1    Allergies    No Known Allergies    Intolerances        PAST MEDICAL & SURGICAL HISTORY:  Hypothyroid        FAMILY HISTORY:      Home Medications:  ARIPiprazole 10 mg oral tablet: 0.5 tab(s) orally once a day (in the morning)      *Takes along with 20mg for a TDD: 25mg * (2021 10:12)  ARIPiprazole 20 mg oral tablet: 1 tab(s) orally once a day (in the morning)    *Takes along with 5mg for a TDD: 25mg* (2021 10:12)  Calcium 600+D oral tablet: 1 tab(s) orally 2 times a day (2021 10:12)  carBAMazepine 400 mg oral tablet, extended release: 1 tab(s) orally 2 times a day (2021 10:12)  Cetaphil topical lotion: Apply topically to affected area 2 times a day (2021 10:12)  divalproex sodium 250 mg oral delayed release tablet: 1 tab(s) orally once a day (in the morning)    *Takes along with 500mg for a TDD in the morninmg  (2021 10:12)  divalproex sodium 500 mg oral delayed release tablet: 2 tab(s) orally 2 times a day at 3pm and 8pm  (2021 10:12)  Fish Oil 1000 mg oral capsule: 1 cap(s) orally once a day (in the morning) go back to usual home dose (15 Raymond 2021 19:47)  levothyroxine 100 mcg (0.1 mg) oral tablet: 1 tab(s) orally once a day     *Takes with 88mcg (1hr apart) for a TDD; 188mcg* follow home protocol (15 Raymond 2021 19:47)  levothyroxine 88 mcg (0.088 mg) oral tablet: 1 tab(s) orally once a day    *Takes with 100mcg (1hr apart) for a TDD; 188mcg * follow home protocol (15 Raymond 2021 19:47)  OLANZapine 20 mg oral tablet: 0.5 tab(s) orally once a day (in the evening) (2021 10:12)  oxybutynin 10 mg/24 hr oral tablet, extended release: 1 tab(s) orally once a day (2021 10:12)  tamsulosin 0.4 mg oral capsule: 1 cap(s) orally once a day (at bedtime) (2021 10:12)  Vitamin D3 1000 intl units (25 mcg) oral tablet:  (2021 10:12)      MEDICATIONS  (STANDING):  apixaban 5 milliGRAM(s) Oral every 12 hours  ARIPiprazole 25 milliGRAM(s) Oral daily  ascorbic acid 500 milliGRAM(s) Oral daily  carBAMazepine ER Tablet 400 milliGRAM(s) Oral two times a day  cholecalciferol 2000 Unit(s) Oral two times a day  dexAMETHasone  Injectable 6 milliGRAM(s) IV Push daily  diVALproex  milliGRAM(s) Oral daily  levothyroxine 88 MICROGram(s) Oral daily  levothyroxine 100 MICROGram(s) Oral daily  metoprolol tartrate 25 milliGRAM(s) Oral two times a day  multivitamin 1 Tablet(s) Oral daily  pantoprazole    Tablet 40 milliGRAM(s) Oral before breakfast  valproic acid 1000 milliGRAM(s) Oral two times a day  zinc sulfate 220 milliGRAM(s) Oral daily    MEDICATIONS  (PRN):  acetaminophen     Tablet .. 650 milliGRAM(s) Oral every 6 hours PRN Temp greater or equal to 38C (100.4F)              Vital Signs Last 24 Hrs  T(C): 36.7 (2022 05:02), Max: 37.1 (2022 20:44)  T(F): 98.1 (2022 05:02), Max: 98.8 (2022 20:44)  HR: 69 (2022 05:02) (69 - 80)  BP: 109/69 (2022 05:02) (109/69 - 135/79)  BP(mean): --  RR: 16 (2022 05:02) (16 - 18)  SpO2: 91% (2022 05:02) (91% - 94%)              LABS:                        14.9   9.38  )-----------( 596      ( 2022 22:11 )             45.9     01-04    x   |  x   |  x   ----------------------------<  x   x    |  x   |  1.00    Ca    9.1      2022 22:11    TPro  8.0  /  Alb  2.3<L>  /  TBili  0.4  /  DBili  0.1  /  AST  53<H>  /  ALT  33  /  AlkPhos  81                WBC:  WBC Count: 9.38 K/uL ( @ 22:11)  WBC Count: 7.26 K/uL ( @ 09:20)      MICROBIOLOGY:  RECENT CULTURES:                  Sodium:  Sodium, Serum: 142 mmol/L ( @ 22:11)  Sodium, Serum: 142 mmol/L ( @ 09:20)      1.00 mg/dL  @ 10:18  0.98 mg/dL  22:11  0.91 mg/dL  @ 11:02  0.87 mg/dL  @ 09:59  0.81 mg/dL  @ 09:20      Hemoglobin:  Hemoglobin: 14.9 g/dL ( @ 22:11)  Hemoglobin: 12.9 g/dL ( @ 09:20)      Platelets: Platelet Count - Automated: 596 K/uL ( @ 22:11)  Platelet Count - Automated: 365 K/uL ( @ 09:20)      LIVER FUNCTIONS - ( 2022 10:18 )  Alb: 2.3 g/dL / Pro: 8.0 g/dL / ALK PHOS: 81 U/L / ALT: 33 U/L / AST: 53 U/L / GGT: x                 RADIOLOGY & ADDITIONAL STUDIES:      MICROBIOLOGY:  RECENT CULTURES:

## 2022-01-05 NOTE — DISCHARGE NOTE PROVIDER - CARE PROVIDER_API CALL
Namrata Poe (DO)  Medicine  [address]  [place] [address]  Phone: [phone]  Fax: [phone]  Follow Up Time: 2 weeks

## 2022-01-05 NOTE — PROGRESS NOTE ADULT - PROBLEM SELECTOR PROBLEM 1
Acute respiratory distress syndrome (ARDS) due to COVID-19 virus

## 2022-01-05 NOTE — PROGRESS NOTE ADULT - SUBJECTIVE AND OBJECTIVE BOX
PAST MEDICAL & SURGICAL HISTORY:  Hypothyroid        MEDICATIONS  (STANDING):  apixaban 5 milliGRAM(s) Oral every 12 hours  ARIPiprazole 25 milliGRAM(s) Oral daily  ascorbic acid 500 milliGRAM(s) Oral daily  carBAMazepine ER Tablet 400 milliGRAM(s) Oral two times a day  cholecalciferol 2000 Unit(s) Oral two times a day  diVALproex  milliGRAM(s) Oral daily  dronabinol 2.5 milliGRAM(s) Oral two times a day  levothyroxine 88 MICROGram(s) Oral daily  levothyroxine 100 MICROGram(s) Oral daily  metoprolol tartrate 25 milliGRAM(s) Oral two times a day  multivitamin 1 Tablet(s) Oral daily  pantoprazole    Tablet 40 milliGRAM(s) Oral before breakfast  valproic acid 1000 milliGRAM(s) Oral two times a day  zinc sulfate 220 milliGRAM(s) Oral daily    MEDICATIONS  (PRN):  acetaminophen     Tablet .. 650 milliGRAM(s) Oral every 6 hours PRN Temp greater or equal to 38C (100.4F)      Patient is a 61y old  Male who presents with a chief complaint of low oxygen with covid 1 week now (05 Jan 2022 10:25)      Vital Signs Last 24 Hrs  T(C): 36.7 (05 Jan 2022 11:31), Max: 37.1 (04 Jan 2022 20:44)  T(F): 98 (05 Jan 2022 11:31), Max: 98.8 (04 Jan 2022 20:44)  HR: 75 (05 Jan 2022 11:31) (69 - 75)  BP: 120/85 (05 Jan 2022 11:31) (109/69 - 134/87)  BP(mean): --  RR: 18 (05 Jan 2022 11:31) (16 - 18)  SpO2: 98% (05 Jan 2022 12:36) (91% - 98%)            REVIEW OF SYSTEMS:    Constitutional: No fever, weight loss or fatigue  Eyes: No eye pain, visual disturbances, or discharge  ENT:  No difficulty hearing, tinnitus, vertigo; No sinus or throat pain  Neck: No pain or stiffness  Breasts: No pain, masses or nipple discharge  Respiratory: No cough, wheezing, chills or hemoptysis  Cardiovascular: No chest pain, palpitations, shortness of breath, dizziness or leg swelling  Gastrointestinal: No abdominal or epigastric pain. No nausea, vomiting or hematemesis; No diarrhea or constipation. No melena or hematochezia.  Genitourinary: No dysuria, frequency, hematuria or incontinence  Rectal: No pain, hemorrhoids or incontinence  Neurological: No headaches, memory loss, loss of strength, numbness or tremors  Skin: No itching, burning, rashes or lesions   Lymph Nodes: No enlarged glands  Endocrine: No heat or cold intolerance; No hair loss  Musculoskeletal: No joint pain or swelling; No muscle, back or extremity pain  Psychiatric: No depression, anxiety, mood swings or difficulty sleeping  Heme/Lymph: No easy bruising or bleeding gums  Allergy and Immunologic: No hives or eczema    PHYSICAL EXAM:  very alert  carries full conversation  walking  6liters 98 percent  Respiratory: CTAB/L   Cardiovascular: S1 and S2, RRR, no M/G/R  Gastrointestinal: BS+, soft, NT/ND  Extremities: No peripheral edema  Vascular: 2+ peripheral pulses  Neurological: A/O x 3, no focal deficits  Skin: No rashes      decubiti:                           14.9   9.38  )-----------( 596      ( 03 Jan 2022 22:11 )             45.9     01-05    x   |  x   |  x   ----------------------------<  x   x    |  x   |  0.94    Ca    9.1      03 Jan 2022 22:11    TPro  8.0  /  Alb  2.3<L>  /  TBili  0.4  /  DBili  0.1  /  AST  58<H>  /  ALT  33  /  AlkPhos  80  01-05                      Radiology:     PAST MEDICAL & SURGICAL HISTORY:  Hypothyroid        MEDICATIONS  (STANDING):  apixaban 5 milliGRAM(s) Oral every 12 hours  ARIPiprazole 25 milliGRAM(s) Oral daily  ascorbic acid 500 milliGRAM(s) Oral daily  carBAMazepine ER Tablet 400 milliGRAM(s) Oral two times a day  cholecalciferol 2000 Unit(s) Oral two times a day  diVALproex  milliGRAM(s) Oral daily  dronabinol 2.5 milliGRAM(s) Oral two times a day  levothyroxine 88 MICROGram(s) Oral daily  levothyroxine 100 MICROGram(s) Oral daily  metoprolol tartrate 25 milliGRAM(s) Oral two times a day  multivitamin 1 Tablet(s) Oral daily  pantoprazole    Tablet 40 milliGRAM(s) Oral before breakfast  valproic acid 1000 milliGRAM(s) Oral two times a day  zinc sulfate 220 milliGRAM(s) Oral daily    MEDICATIONS  (PRN):  acetaminophen     Tablet .. 650 milliGRAM(s) Oral every 6 hours PRN Temp greater or equal to 38C (100.4F)      Patient is a 61y old  Male who presents with a chief complaint of low oxygen with covid 1 week now (05 Jan 2022 10:25)      Vital Signs Last 24 Hrs  T(C): 36.7 (05 Jan 2022 11:31), Max: 37.1 (04 Jan 2022 20:44)  T(F): 98 (05 Jan 2022 11:31), Max: 98.8 (04 Jan 2022 20:44)  HR: 75 (05 Jan 2022 11:31) (69 - 75)  BP: 120/85 (05 Jan 2022 11:31) (109/69 - 134/87)  BP(mean): --  RR: 18 (05 Jan 2022 11:31) (16 - 18)  SpO2: 98% (05 Jan 2022 12:36) (91% - 98%)            REVIEW OF SYSTEMS:    Constitutional: No fever, weight loss or fatigue  Eyes: No eye pain, visual disturbances, or discharge  ENT:  No difficulty hearing, tinnitus, vertigo; No sinus or throat pain  Neck: No pain or stiffness  Breasts: No pain, masses or nipple discharge  Respiratory: No cough, wheezing, chills or hemoptysis  Cardiovascular: No chest pain, palpitations, shortness of breath, dizziness or leg swelling  Gastrointestinal: No abdominal or epigastric pain. No nausea, vomiting or hematemesis; No diarrhea or constipation. No melena or hematochezia.  Genitourinary: No dysuria, frequency, hematuria or incontinence  Rectal: No pain, hemorrhoids or incontinence  Neurological: No headaches, memory loss, loss of strength, numbness or tremors  Skin: No itching, burning, rashes or lesions   Lymph Nodes: No enlarged glands  Endocrine: No heat or cold intolerance; No hair loss  Musculoskeletal: No joint pain or swelling; No muscle, back or extremity pain  Psychiatric: No depression, anxiety, mood swings or difficulty sleeping  Heme/Lymph: No easy bruising or bleeding gums  Allergy and Immunologic: No hives or eczema    PHYSICAL EXAM:  very alert  carries full conversation  walking  2liters 96 percent  i fed patient  he could not eat minced soft diet  he ate pudding  Respiratory: CTAB/L   Cardiovascular: S1 and S2, RRR, no M/G/R  Gastrointestinal: BS+, soft, NT/ND  Extremities: No peripheral edema  Skin: No rashes  decubiti: no                          14.9   9.38  )-----------( 596      ( 03 Jan 2022 22:11 )             45.9     01-05    x   |  x   |  x   ----------------------------<  x   x    |  x   |  0.94    Ca    9.1      03 Jan 2022 22:11    TPro  8.0  /  Alb  2.3<L>  /  TBili  0.4  /  DBili  0.1  /  AST  58<H>  /  ALT  33  /  AlkPhos  80  01-05                      Radiology:

## 2022-01-05 NOTE — PROGRESS NOTE ADULT - PROBLEM SELECTOR PLAN 1
much improved  resolved aspiration pn  trial lowering o2  possible dc am  ct head carotids wnl  seen by neuro,,,no def  family felt maybe the was a change speech  but he is very coherent,,,and has been all along much improved  resolved aspiration pn  trial lowering o2  possible dc am  ct head carotids wnl  seen by neuro,,,no def  family felt maybe the was a change speech  but he is very coherent,,,and has been all along  I changed diet puree with thicket  not minced and soft,,he needed much prompting

## 2022-01-05 NOTE — PROGRESS NOTE ADULT - SUBJECTIVE AND OBJECTIVE BOX
[facility] DIVISION of INFECTIOUS DISEASE  Rickey Guzman MD PhD, Tara Mcdonald MD, Calista Espinoza MD, Lou Tilley MD, John Moss MD  and providing coverage with Joy Markham MD and Derik Hooker MD  Providing Infectious Disease Consultations at [facility], [facility]'s      Office# [phone] to schedule follow up appointments  Answering Service for urgent calls or New Consults [phone]    Infectious Diseases Progress Note:    CHAD DICKEY is a 61y year old Male patient    COVID-19 Patient  Notes reviewed  No concerning events overnight.   Allergies: No Known Allergies    ANTIBIOTICS/RELEVANT:  Antimicrobials   Immunologic:   Other Meds: acetaminophen     Tablet .. 650 milliGRAM(s) Oral every 6 hours PRN  apixaban 5 milliGRAM(s) Oral every 12 hours  ARIPiprazole 25 milliGRAM(s) Oral daily  ascorbic acid 500 milliGRAM(s) Oral daily  carBAMazepine ER Tablet 400 milliGRAM(s) Oral two times a day  cholecalciferol 2000 Unit(s) Oral two times a day  diVALproex  milliGRAM(s) Oral daily  levothyroxine 88 MICROGram(s) Oral daily  levothyroxine 100 MICROGram(s) Oral daily  metoprolol tartrate 25 milliGRAM(s) Oral two times a day  multivitamin 1 Tablet(s) Oral daily  pantoprazole    Tablet 40 milliGRAM(s) Oral before breakfast  valproic acid 1000 milliGRAM(s) Oral two times a day  zinc sulfate 220 milliGRAM(s) Oral daily    Objective:  Vital Signs Last 24 Hrs  T(F): 98.1 (05 Jan 2022 05:02), Max: 98.8 (04 Jan 2022 20:44)  HR: 69 (05 Jan 2022 05:02) (69 - 80)  BP: 109/69 (05 Jan 2022 05:02) (109/69 - 135/79)  RR: 16 (05 Jan 2022 05:02) (16 - 18)  SpO2: 91% (05 Jan 2022 05:02) (91% - 94%)  PHYSICAL EXAM:  HEENT: NC/AT, anicteric, supple  Respiratory: no acc muscle use, breathing comfortably  Cardiovascular: S1 S2 present, normal rate  Gastrointestinal: normal appearing, nondistended  Extremities: no cyanosis    LABS:                        14.9   9.38  )-----------( 596      ( 03 Jan 2022 22:11 )             45.9     WBC trend:  9.38 01-03 @ 22:11  7.26 01-01 @ 09:20  6.29 12-30 @ 09:56  8.12 12-29 @ 11:25    01-05    x   |  x   |  x   ----------------------------<  x   x    |  x   |  0.94    Ca    9.1      03 Jan 2022 22:11    TPro  8.0  /  Alb  2.3<L>  /  TBili  0.4  /  DBili  0.1  /  AST  53<H>  /  ALT  33  /  AlkPhos  81  01-04    Creatinine, Serum: 0.94 mg/dL (01-05-22 @ 09:43)  Creatinine, Serum: 1.00 mg/dL (01-04-22 @ 10:18)  Creatinine, Serum: 0.98 mg/dL (01-03-22 @ 22:11)  Creatinine, Serum: 0.91 mg/dL (01-03-22 @ 11:02)  Creatinine, Serum: 0.87 mg/dL (01-02-22 @ 09:59)  Creatinine, Serum: 0.81 mg/dL (01-01-22 @ 09:20)  Creatinine, Serum: 1.00 mg/dL (12-30-21 @ 09:56)  Creatinine, Serum: 0.99 mg/dL (12-30-21 @ 06:59)  Creatinine, Serum: 1.10 mg/dL (12-29-21 @ 11:25)    Auto Neutrophil #: 6.19 K/uL (01-03-22 @ 22:11)  Auto Neutrophil #: 5.16 K/uL (12-30-21 @ 09:56)  Auto Neutrophil #: 6.58 K/uL (12-29-21 @ 11:25)  Auto Neutrophil #: 6.23 K/uL (12-28-21 @ 14:29)  Auto Neutrophil #: 6.37 K/uL (12-27-21 @ 09:36)  Auto Neutrophil #: 4.51 K/uL (12-26-21 @ 20:50)    Auto Lymphocyte #: 1.78 K/uL (01-03-22 @ 22:11)  Auto Lymphocyte #: 0.44 K/uL (12-30-21 @ 09:56)  Auto Lymphocyte #: 0.65 K/uL (12-29-21 @ 11:25)  Auto Lymphocyte #: 0.61 K/uL (12-28-21 @ 14:29)  Auto Lymphocyte #: 0.51 K/uL (12-27-21 @ 09:36)  Auto Lymphocyte #: 0.99 K/uL (12-26-21 @ 20:50)    Procalcitonin, Serum: 0.18 ng/mL (01-01-22 @ 09:20)  Procalcitonin, Serum: 0.49 ng/mL (12-28-21 @ 07:43)  Procalcitonin, Serum: 0.28 ng/mL (12-27-21 @ 09:36)    Ferritin, Serum: 734 ng/mL (01-01-22 @ 12:31)  Ferritin, Serum: 1410 ng/mL (12-29-21 @ 00:19)  Ferritin, Serum: 1173 ng/mL (12-28-21 @ 12:24)  Ferritin, Serum: 1024 ng/mL (12-27-21 @ 12:15)    D-Dimer Assay, Quantitative: 738 ng/mL DDU (12-28-21 @ 22:45)  D-Dimer Assay, Quantitative: 217 ng/mL DDU (12-27-21 @ 09:36)    Lactate, Blood: 2.0 mmol/L (12-27-21 @ 09:37)    Prothrombin Time, Plasma: 13.4 sec (12-27-21 @ 09:36)    MICROBIOLOGY: reviewed  RADIOLOGY & ADDITIONAL STUDIES: reviewed

## 2022-01-05 NOTE — PROGRESS NOTE ADULT - ASSESSMENT
Patient is a 60 year old male with PMH of bipolar disorder, intellectual developmental disability, anxiety, hypothyroidism, HLD, BPH, eczema who was diagnosed with COVID 1 week ago and was brought in for fever, cough, weakness and body aches and admitted for hypoxia due to COVID pneumonia.   +COVID on admission, negative flu  CXR reviewed, b/l infiltrates  CT chest imaging reviewed, c/w COVID  Vaccinated with Pfizer x2  NLR on admission - 4.51/0.99= 4.6    Recommendations:  12/27 - afebrile, on NC 1.5L, NLR -6.37/0.51 = 12.5. Started on dexamethasone and remdesivir. Started on pip-tazo for possible bacterial pna, will check for ferritin/pct, if ratio >1000 then would d/c pip-tazo. On heparin for vte ppx. Prone as tolerated. Monitor CBC with diff, inflammatory markers.    12/28 - febrile last night likely d/t COVID, remains on NC, continue remdesivir x5d, dexamethasone x10d. FPR ~3600 - low suspicion for superimposed bacterial pneumonia. UA negative. Follow blood cultures - if remains negative x48h - discontinue pip-tazo. Supplemental O2 as needed, wean as tolerated.    12/29 - NLR 6.58/0.65= 10, on NC 6L, fevers likely due to COVID. Continue remdesivir, dexamethasone. FPR remains >1000. Bcx NGTD x2 - if remains negative x48h, can d/c pip-tazo.   12/30 - on NC 6L, fever curve better Tm 100F in last 24h. Continue remdesivir until 12/31. Continue dexamethasone x10d. Blood and urine cultures negative - will discontinue pip-tazo. Continue supportive care. Monitor CBC, inflammatory markers, LFTs.   12/31 - c/w on supplemental O2. Continue remdesivir until 12/31. Continue dexamethasone x10d. Blood and urine cultures negative - will d/c pip-tazo. Continue supportive care.   1/1 - c/w supplemental O2. S/p remdesivir. C/w steroids.   1/2 - c/w supplemental O2. S/p remdesivir. C/w steroids x10 day course.   1/3 - remains on NC 6L, wean as tolerated, cont dexamethasone x10d. Continue supportive care   1/4 - remains on NC 6L, continue dexamethasone x10d - last day today, continue supportive care   1/5 - remains on NC 6L, afebrile, s/p dexamethasone x10d. Wean O2 as tolerated, continue supportive care       Lou Tilley M.D.  [facility], Division of Infectious Diseases  [phone]  After 5pm on weekdays and all day on weekends - please call [phone]

## 2022-01-05 NOTE — DISCHARGE NOTE PROVIDER - HOSPITAL COURSE
patient came in with low o2/fevers/lethragy with + covid x1 week  has been with his mother,,,could not be at group home + covid  parents brought him due to above sx then mom went to hospital for + covid  he was seen by id pulm cardiology,,,sp afib flutter on eliquis  + sars pn and aspiration pneumonia,,,completed dexa remdesivir and iv abxs  he was seen by speech,,,minced soft diet,,,but patient did not like consistency and could not consume it  he prefered puree,,,,i fed him myself and added marinol to increase his appetite  he needed much prompting  his oxygen demand decreased to 2 liters nasal cannula  he has been walking with physical therapy  cannot go back to his house and cannot go back to mom  stable dc patient came in with low o2/fevers/lethragy with + covid x1 week  has been with his mother,,,could not be at group home + covid  parents brought him due to above sx then mom went to hospital for + covid  he was seen by id pulm cardiology,,,sp afib flutter on eliquis  ct brain neg  carotid no sig plaque  + sars pn and aspiration pneumonia,,,completed dexa remdesivir and iv abxs  he was seen by speech,,,minced soft diet,,,but patient did not like consistency and could not consume it  he prefered puree,,,,i fed him myself and added marinol to increase his appetite  he needed much prompting  his oxygen demand decreased to 2 liters nasal cannula  he has been walking with physical therapy  cannot go back to his house and cannot go back to mom  stable dc

## 2022-01-06 LAB
ALBUMIN SERPL ELPH-MCNC: 2.3 G/DL — LOW (ref 3.3–5)
ALBUMIN SERPL ELPH-MCNC: 2.3 G/DL — LOW (ref 3.3–5)
ALP SERPL-CCNC: 75 U/L — SIGNIFICANT CHANGE UP (ref 40–120)
ALP SERPL-CCNC: 82 U/L — SIGNIFICANT CHANGE UP (ref 40–120)
ALT FLD-CCNC: 30 U/L — SIGNIFICANT CHANGE UP (ref 12–78)
ALT FLD-CCNC: 30 U/L — SIGNIFICANT CHANGE UP (ref 12–78)
ANION GAP SERPL CALC-SCNC: 6 MMOL/L — SIGNIFICANT CHANGE UP (ref 5–17)
AST SERPL-CCNC: 43 U/L — HIGH (ref 15–37)
AST SERPL-CCNC: 46 U/L — HIGH (ref 15–37)
BASOPHILS # BLD AUTO: 0.08 K/UL — SIGNIFICANT CHANGE UP (ref 0–0.2)
BASOPHILS NFR BLD AUTO: 0.8 % — SIGNIFICANT CHANGE UP (ref 0–2)
BILIRUB DIRECT SERPL-MCNC: <0.1 MG/DL — SIGNIFICANT CHANGE UP (ref 0–0.3)
BILIRUB INDIRECT FLD-MCNC: >0.3 MG/DL — SIGNIFICANT CHANGE UP (ref 0.2–1)
BILIRUB SERPL-MCNC: 0.4 MG/DL — SIGNIFICANT CHANGE UP (ref 0.2–1.2)
BILIRUB SERPL-MCNC: 0.4 MG/DL — SIGNIFICANT CHANGE UP (ref 0.2–1.2)
BUN SERPL-MCNC: 18 MG/DL — SIGNIFICANT CHANGE UP (ref 7–23)
CALCIUM SERPL-MCNC: 9.1 MG/DL — SIGNIFICANT CHANGE UP (ref 8.5–10.1)
CHLORIDE SERPL-SCNC: 107 MMOL/L — SIGNIFICANT CHANGE UP (ref 96–108)
CO2 SERPL-SCNC: 26 MMOL/L — SIGNIFICANT CHANGE UP (ref 22–31)
CREAT SERPL-MCNC: 0.99 MG/DL — SIGNIFICANT CHANGE UP (ref 0.5–1.3)
EOSINOPHIL # BLD AUTO: 0.08 K/UL — SIGNIFICANT CHANGE UP (ref 0–0.5)
EOSINOPHIL NFR BLD AUTO: 0.8 % — SIGNIFICANT CHANGE UP (ref 0–6)
GLUCOSE SERPL-MCNC: 90 MG/DL — SIGNIFICANT CHANGE UP (ref 70–99)
HCT VFR BLD CALC: 44.3 % — SIGNIFICANT CHANGE UP (ref 39–50)
HGB BLD-MCNC: 14.7 G/DL — SIGNIFICANT CHANGE UP (ref 13–17)
IMM GRANULOCYTES NFR BLD AUTO: 5.7 % — HIGH (ref 0–1.5)
LYMPHOCYTES # BLD AUTO: 2.64 K/UL — SIGNIFICANT CHANGE UP (ref 1–3.3)
LYMPHOCYTES # BLD AUTO: 27.8 % — SIGNIFICANT CHANGE UP (ref 13–44)
MCHC RBC-ENTMCNC: 30.9 PG — SIGNIFICANT CHANGE UP (ref 27–34)
MCHC RBC-ENTMCNC: 33.2 GM/DL — SIGNIFICANT CHANGE UP (ref 32–36)
MCV RBC AUTO: 93.1 FL — SIGNIFICANT CHANGE UP (ref 80–100)
MONOCYTES # BLD AUTO: 1.12 K/UL — HIGH (ref 0–0.9)
MONOCYTES NFR BLD AUTO: 11.8 % — SIGNIFICANT CHANGE UP (ref 2–14)
NEUTROPHILS # BLD AUTO: 5.03 K/UL — SIGNIFICANT CHANGE UP (ref 1.8–7.4)
NEUTROPHILS NFR BLD AUTO: 53.1 % — SIGNIFICANT CHANGE UP (ref 43–77)
NRBC # BLD: 0 /100 WBCS — SIGNIFICANT CHANGE UP (ref 0–0)
PLATELET # BLD AUTO: 548 K/UL — HIGH (ref 150–400)
POTASSIUM SERPL-MCNC: 4.4 MMOL/L — SIGNIFICANT CHANGE UP (ref 3.5–5.3)
POTASSIUM SERPL-SCNC: 4.4 MMOL/L — SIGNIFICANT CHANGE UP (ref 3.5–5.3)
PROT SERPL-MCNC: 7.7 G/DL — SIGNIFICANT CHANGE UP (ref 6–8.3)
PROT SERPL-MCNC: 7.9 G/DL — SIGNIFICANT CHANGE UP (ref 6–8.3)
RBC # BLD: 4.76 M/UL — SIGNIFICANT CHANGE UP (ref 4.2–5.8)
RBC # FLD: 13.8 % — SIGNIFICANT CHANGE UP (ref 10.3–14.5)
SARS-COV-2 RNA SPEC QL NAA+PROBE: SIGNIFICANT CHANGE UP
SODIUM SERPL-SCNC: 139 MMOL/L — SIGNIFICANT CHANGE UP (ref 135–145)
WBC # BLD: 9.49 K/UL — SIGNIFICANT CHANGE UP (ref 3.8–10.5)
WBC # FLD AUTO: 9.49 K/UL — SIGNIFICANT CHANGE UP (ref 3.8–10.5)

## 2022-01-06 RX ADMIN — Medication 2000 UNIT(S): at 05:32

## 2022-01-06 RX ADMIN — Medication 25 MILLIGRAM(S): at 05:32

## 2022-01-06 RX ADMIN — APIXABAN 5 MILLIGRAM(S): 2.5 TABLET, FILM COATED ORAL at 17:49

## 2022-01-06 RX ADMIN — Medication 1000 MILLIGRAM(S): at 05:32

## 2022-01-06 RX ADMIN — APIXABAN 5 MILLIGRAM(S): 2.5 TABLET, FILM COATED ORAL at 05:32

## 2022-01-06 RX ADMIN — Medication 2000 UNIT(S): at 17:48

## 2022-01-06 RX ADMIN — PANTOPRAZOLE SODIUM 40 MILLIGRAM(S): 20 TABLET, DELAYED RELEASE ORAL at 05:37

## 2022-01-06 RX ADMIN — ZINC SULFATE TAB 220 MG (50 MG ZINC EQUIVALENT) 220 MILLIGRAM(S): 220 (50 ZN) TAB at 11:43

## 2022-01-06 RX ADMIN — Medication 25 MILLIGRAM(S): at 17:49

## 2022-01-06 RX ADMIN — Medication 500 MILLIGRAM(S): at 11:43

## 2022-01-06 RX ADMIN — Medication 2.5 MILLIGRAM(S): at 17:48

## 2022-01-06 RX ADMIN — Medication 400 MILLIGRAM(S): at 05:32

## 2022-01-06 RX ADMIN — Medication 100 MICROGRAM(S): at 05:32

## 2022-01-06 RX ADMIN — ARIPIPRAZOLE 25 MILLIGRAM(S): 15 TABLET ORAL at 11:43

## 2022-01-06 RX ADMIN — Medication 1000 MILLIGRAM(S): at 17:48

## 2022-01-06 RX ADMIN — Medication 400 MILLIGRAM(S): at 17:49

## 2022-01-06 RX ADMIN — Medication 2.5 MILLIGRAM(S): at 05:37

## 2022-01-06 RX ADMIN — Medication 1 TABLET(S): at 11:43

## 2022-01-06 RX ADMIN — Medication 88 MICROGRAM(S): at 05:32

## 2022-01-06 RX ADMIN — DIVALPROEX SODIUM 750 MILLIGRAM(S): 500 TABLET, DELAYED RELEASE ORAL at 11:43

## 2022-01-06 NOTE — PROGRESS NOTE ADULT - SUBJECTIVE AND OBJECTIVE BOX
[facility] DIVISION of INFECTIOUS DISEASE  Rickey Guzman MD PhD, Tara Mcdonald MD, Calista Espinoza MD, Lou Tilley MD, John Moss MD  and providing coverage with Joy Markham MD and Derik Hooker MD  Providing Infectious Disease Consultations at [facility], [facility]'s      Office# [phone] to schedule follow up appointments  Answering Service for urgent calls or New Consults [phone]    Infectious Diseases Progress Note:    CHAD DICKEY is a 61y year old Male patient    COVID-19 Patient  Notes reviewed  No concerning events overnight.   Allergies: No Known Allergies    ANTIBIOTICS/RELEVANT:  Antimicrobials   Immunologic:   Other Meds: acetaminophen     Tablet .. 650 milliGRAM(s) Oral every 6 hours PRN  apixaban 5 milliGRAM(s) Oral every 12 hours  ARIPiprazole 25 milliGRAM(s) Oral daily  ascorbic acid 500 milliGRAM(s) Oral daily  carBAMazepine ER Tablet 400 milliGRAM(s) Oral two times a day  cholecalciferol 2000 Unit(s) Oral two times a day  diVALproex  milliGRAM(s) Oral daily  dronabinol 2.5 milliGRAM(s) Oral two times a day  levothyroxine 100 MICROGram(s) Oral daily  levothyroxine 88 MICROGram(s) Oral daily  metoprolol tartrate 25 milliGRAM(s) Oral two times a day  multivitamin 1 Tablet(s) Oral daily  pantoprazole    Tablet 40 milliGRAM(s) Oral before breakfast  valproic acid 1000 milliGRAM(s) Oral two times a day  zinc sulfate 220 milliGRAM(s) Oral daily    Objective:  Vital Signs Last 24 Hrs  T(F): 98.6 (06 Jan 2022 05:10), Max: 98.6 (06 Jan 2022 05:10)  HR: 72 (06 Jan 2022 05:10) (66 - 75)  BP: 120/82 (06 Jan 2022 05:10) (109/71 - 120/85)  RR: 18 (06 Jan 2022 05:10) (18 - 18)  SpO2: 92% (06 Jan 2022 05:10) (92% - 98%)  PHYSICAL EXAM:  HEENT: NC/AT, anicteric, supple  Respiratory: no acc muscle use, breathing comfortably  Cardiovascular: S1 S2 present, normal rate  Gastrointestinal: normal appearing, nondistended  Extremities: no edema, no cyanosis    LABS:    WBC trend:  9.38 01-03 @ 22:11  7.26 01-01 @ 09:20  6.29 12-30 @ 09:56    01-05    140  |  107  |  20  ----------------------------<  147<H>  4.6   |  24  |  1.10    Ca    8.8      05 Jan 2022 18:50    TPro  7.9  /  Alb  2.4<L>  /  TBili  0.3  /  DBili  x   /  AST  45<H>  /  ALT  30  /  AlkPhos  81  01-05    Creatinine, Serum: 1.10 mg/dL (01-05-22 @ 18:50)  Creatinine, Serum: 0.94 mg/dL (01-05-22 @ 09:43)  Creatinine, Serum: 1.00 mg/dL (01-04-22 @ 10:18)  Creatinine, Serum: 0.98 mg/dL (01-03-22 @ 22:11)  Creatinine, Serum: 0.91 mg/dL (01-03-22 @ 11:02)  Creatinine, Serum: 0.87 mg/dL (01-02-22 @ 09:59)  Creatinine, Serum: 0.81 mg/dL (01-01-22 @ 09:20)  Creatinine, Serum: 1.00 mg/dL (12-30-21 @ 09:56)    Auto Neutrophil #: 6.19 K/uL (01-03-22 @ 22:11)  Auto Neutrophil #: 5.16 K/uL (12-30-21 @ 09:56)  Auto Neutrophil #: 6.58 K/uL (12-29-21 @ 11:25)  Auto Neutrophil #: 6.23 K/uL (12-28-21 @ 14:29)  Auto Neutrophil #: 6.37 K/uL (12-27-21 @ 09:36)    Auto Lymphocyte #: 1.78 K/uL (01-03-22 @ 22:11)  Auto Lymphocyte #: 0.44 K/uL (12-30-21 @ 09:56)  Auto Lymphocyte #: 0.65 K/uL (12-29-21 @ 11:25)  Auto Lymphocyte #: 0.61 K/uL (12-28-21 @ 14:29)  Auto Lymphocyte #: 0.51 K/uL (12-27-21 @ 09:36)    Procalcitonin, Serum: 0.18 ng/mL (01-01-22 @ 09:20)  Procalcitonin, Serum: 0.49 ng/mL (12-28-21 @ 07:43)  Procalcitonin, Serum: 0.28 ng/mL (12-27-21 @ 09:36)    Ferritin, Serum: 734 ng/mL (01-01-22 @ 12:31)  Ferritin, Serum: 1410 ng/mL (12-29-21 @ 00:19)  Ferritin, Serum: 1173 ng/mL (12-28-21 @ 12:24)  Ferritin, Serum: 1024 ng/mL (12-27-21 @ 12:15)    D-Dimer Assay, Quantitative: 738 ng/mL DDU (12-28-21 @ 22:45)  D-Dimer Assay, Quantitative: 217 ng/mL DDU (12-27-21 @ 09:36)    Lactate, Blood: 2.0 mmol/L (12-27-21 @ 09:37)  Prothrombin Time, Plasma: 13.4 sec (12-27-21 @ 09:36)    MICROBIOLOGY: reviewed  RADIOLOGY & ADDITIONAL STUDIES: reviewed

## 2022-01-06 NOTE — PROGRESS NOTE ADULT - SUBJECTIVE AND OBJECTIVE BOX
[facility] Cardiology    CHIEF COMPLAINT: Patient is a 61y old  Male who presents with a chief complaint of low oxygen with covid 1 week now (06 Jan 2022 08:25)      Follow Up: [ ] Chest Pain      [ ] Dyspnea     [ ] Palpitations    [ ] Atrial Fibrillation     [ ] Ventricular Dysrhythmia    [ ] Abnormal EKG                      [ ] Abnormal Cardiac Enzymes     [ ] Valvular Disease    HPI:    PAST MEDICAL & SURGICAL HISTORY:  Hypothyroid      MEDICATIONS  (STANDING):  apixaban 5 milliGRAM(s) Oral every 12 hours  ARIPiprazole 25 milliGRAM(s) Oral daily  ascorbic acid 500 milliGRAM(s) Oral daily  carBAMazepine ER Tablet 400 milliGRAM(s) Oral two times a day  cholecalciferol 2000 Unit(s) Oral two times a day  diVALproex  milliGRAM(s) Oral daily  dronabinol 2.5 milliGRAM(s) Oral two times a day  levothyroxine 100 MICROGram(s) Oral daily  levothyroxine 88 MICROGram(s) Oral daily  metoprolol tartrate 25 milliGRAM(s) Oral two times a day  multivitamin 1 Tablet(s) Oral daily  pantoprazole    Tablet 40 milliGRAM(s) Oral before breakfast  valproic acid 1000 milliGRAM(s) Oral two times a day  zinc sulfate 220 milliGRAM(s) Oral daily    MEDICATIONS  (PRN):  acetaminophen     Tablet .. 650 milliGRAM(s) Oral every 6 hours PRN Temp greater or equal to 38C (100.4F)    Allergies    No Known Allergies    Intolerances        REVIEW OF SYSTEMS:    CONSTITUTIONAL: No weakness, fevers or chills.   EYES/ENT: No visual changes;    NECK: No pain or stiffness  RESPIRATORY: No cough, wheezing, No shortness of breath  CARDIOVASCULAR: No chest pain or palpitations  GASTROINTESTINAL: No abdominal pain, or hematochezia.  GENITOURINARY: No dysuria orhematuria  NEUROLOGICAL: No numbness or weakness  SKIN: No itching, burning, rashes  All other review of systems is negative unless indicated above    Vital Signs Last 24 Hrs  T(C): 37 (06 Jan 2022 05:10), Max: 37 (06 Jan 2022 05:10)  T(F): 98.6 (06 Jan 2022 05:10), Max: 98.6 (06 Jan 2022 05:10)  HR: 72 (06 Jan 2022 05:10) (66 - 75)  BP: 120/82 (06 Jan 2022 05:10) (109/71 - 120/85)  BP(mean): --  RR: 18 (06 Jan 2022 05:10) (18 - 18)  SpO2: 92% (06 Jan 2022 05:10) (92% - 98%)  I&O's Summary      PHYSICAL EXAM:  Constitutional: NAD  Neurological: Alert, no focal deficits  HEENT: no JVD, EOMI  Cardiovascular: Regular, S1 and S2, no murmur  Pulmonary: breath sounds bilaterally  Gastrointestinal: Bowel Sounds present, soft, nontender  EXT:  no peripheral edema  Skin: No rashes.  Psych:  Mood calm  LABS: All Labs Reviewed:                          14.7   9.49  )-----------( 548      ( 06 Jan 2022 07:59 )             44.3     01-06    139  |  107  |  18  ----------------------------<  90  4.4   |  26  |  0.99    Ca    9.1      06 Jan 2022 07:59    TPro  7.7  /  Alb  2.3<L>  /  TBili  0.4  /  DBili  <0.1  /  AST  43<H>  /  ALT  30  /  AlkPhos  75  01-06    CT Head No Cont:   ACC: [id number] EXAM:  CT BRAIN                          PROCEDURE DATE:  01/05/2022          INTERPRETATION:  Noncontrast CT of the brain.    CLINICAL INDICATION:  Confusion, COVID, aspiration pneumonia    TECHNIQUE : Axial CT scanning of the brain was obtained from the skull   base to the vertex without the administration of intravenous contrast.   Sagittal and coronal reformats were provided.    COMPARISON: None available    FINDINGS:    Study is limited by motion.    No hydrocephalus, mass effect, midline shift, acute intracranial   hemorrhage, or brain edema.    Visualized paranasal sinuses and mastoid air cells are clear.    IMPRESSION:    No hydrocephalus, acute intracranial hemorrhage, mass effect, or brain   edema.    --- End of Report ---    TROY HERRERA MD; Attending Radiologist  This document has been electronically signed. Jan 5 2022 12:07PM (01-05-22 @ 11:32)    · Assessment	  61 m hypothyroid a/w COVID PNA  s/p IV ABX, antivirals  s/p heparin, abx    -S/p Atrial flutter on admission  spontaneously converted to NSR.  s/p heparin. now on Eliquis 5 BID  BP/HR stable on metoprolol 25 mg BID.   Hgb stable on AC.     Will follow prn

## 2022-01-06 NOTE — PROGRESS NOTE ADULT - SUBJECTIVE AND OBJECTIVE BOX
CHAD DICKEY    PLV 2NOR 242 W1    Allergies    No Known Allergies    Intolerances        PAST MEDICAL & SURGICAL HISTORY:  Hypothyroid        FAMILY HISTORY:      Home Medications:  apixaban 5 mg oral tablet: 1 tab(s) orally every 12 hours (05 Jan 2022 23:36)  ARIPiprazole 5 mg oral tablet: 5 tab(s) orally once a day (05 Jan 2022 23:36)  ascorbic acid 500 mg oral tablet: 1 tab(s) orally once a day (05 Jan 2022 23:36)  Calcium 600+D oral tablet: 1 tab(s) orally 2 times a day (13 Jun 2021 10:12)  carBAMazepine 400 mg oral tablet, extended release: 1 tab(s) orally 2 times a day (13 Jun 2021 10:12)  Cetaphil topical lotion: Apply topically to affected area 2 times a day (13 Jun 2021 10:12)  cholecalciferol oral tablet: 2000 unit(s) orally 2 times a day (05 Jan 2022 23:36)  divalproex sodium 250 mg oral tablet, extended release: 3 tab(s) orally once a day (05 Jan 2022 23:36)  dronabinol 2.5 mg oral capsule: 1 cap(s) orally 2 times a day (05 Jan 2022 23:36)  Fish Oil 1000 mg oral capsule: 1 cap(s) orally once a day (in the morning) go back to usual home dose (15 Raymond 2021 19:47)  levothyroxine 100 mcg (0.1 mg) oral tablet: 1 tab(s) orally once a day     *Takes with 88mcg (1hr apart) for a TDD; 188mcg* follow home protocol (15 Raymond 2021 19:47)  levothyroxine 88 mcg (0.088 mg) oral tablet: 1 tab(s) orally once a day    *Takes with 100mcg (1hr apart) for a TDD; 188mcg * follow home protocol (15 Raymond 2021 19:47)  metoprolol tartrate 25 mg oral tablet: 1 tab(s) orally 2 times a day hsbp less 120,,,hhr less 60 (05 Jan 2022 23:36)  Multiple Vitamins oral tablet: 1 tab(s) orally once a day (05 Jan 2022 23:36)  oxybutynin 10 mg/24 hr oral tablet, extended release: 1 tab(s) orally once a day (13 Jun 2021 10:12)  pantoprazole 40 mg oral delayed release tablet: 1 tab(s) orally once a day (before a meal) (05 Jan 2022 23:36)  tamsulosin 0.4 mg oral capsule: 1 cap(s) orally once a day (at bedtime) hsbp less 120 (05 Jan 2022 23:36)  valproic acid 250 mg oral capsule: 4 cap(s) orally 2 times a day (05 Jan 2022 23:36)  zinc sulfate 220 mg oral capsule: 1 cap(s) orally once a day (05 Jan 2022 23:36)      MEDICATIONS  (STANDING):  apixaban 5 milliGRAM(s) Oral every 12 hours  ARIPiprazole 25 milliGRAM(s) Oral daily  ascorbic acid 500 milliGRAM(s) Oral daily  carBAMazepine ER Tablet 400 milliGRAM(s) Oral two times a day  cholecalciferol 2000 Unit(s) Oral two times a day  diVALproex  milliGRAM(s) Oral daily  dronabinol 2.5 milliGRAM(s) Oral two times a day  levothyroxine 100 MICROGram(s) Oral daily  levothyroxine 88 MICROGram(s) Oral daily  metoprolol tartrate 25 milliGRAM(s) Oral two times a day  multivitamin 1 Tablet(s) Oral daily  pantoprazole    Tablet 40 milliGRAM(s) Oral before breakfast  valproic acid 1000 milliGRAM(s) Oral two times a day  zinc sulfate 220 milliGRAM(s) Oral daily    MEDICATIONS  (PRN):  acetaminophen     Tablet .. 650 milliGRAM(s) Oral every 6 hours PRN Temp greater or equal to 38C (100.4F)      Diet, Pureed:   Mildly Thick Liquids (MILDTHICKLIQS)  Supplement Feeding Modality:  Oral  Ensure Pudding Cans or Servings Per Day:  3       Frequency:  Three Times a day (01-05-22 @ 17:40) [Active]          Vital Signs Last 24 Hrs  T(C): 37 (06 Jan 2022 05:10), Max: 37 (06 Jan 2022 05:10)  T(F): 98.6 (06 Jan 2022 05:10), Max: 98.6 (06 Jan 2022 05:10)  HR: 72 (06 Jan 2022 05:10) (66 - 75)  BP: 120/82 (06 Jan 2022 05:10) (109/71 - 120/85)  BP(mean): --  RR: 18 (06 Jan 2022 05:10) (18 - 18)  SpO2: 92% (06 Jan 2022 05:10) (92% - 98%)              LABS:                        14.7   9.49  )-----------( 548      ( 06 Jan 2022 07:59 )             44.3     01-05    140  |  107  |  20  ----------------------------<  147<H>  4.6   |  24  |  1.10    Ca    8.8      05 Jan 2022 18:50    TPro  7.9  /  Alb  2.4<L>  /  TBili  0.3  /  DBili  x   /  AST  45<H>  /  ALT  30  /  AlkPhos  81  01-05              WBC:  WBC Count: 9.49 K/uL (01-06 @ 07:59)  WBC Count: 9.38 K/uL (01-03 @ 22:11)      MICROBIOLOGY:  RECENT CULTURES:                  Sodium:  Sodium, Serum: 140 mmol/L (01-05 @ 18:50)  Sodium, Serum: 142 mmol/L (01-03 @ 22:11)      1.10 mg/dL 01-05 @ 18:50  0.94 mg/dL 01-05 @ 09:43  1.00 mg/dL 01-04 @ 10:18  0.98 mg/dL 01-03 @ 22:11  0.91 mg/dL 01-03 @ 11:02  0.87 mg/dL 01-02 @ 09:59      Hemoglobin:  Hemoglobin: 14.7 g/dL (01-06 @ 07:59)  Hemoglobin: 14.9 g/dL (01-03 @ 22:11)      Platelets: Platelet Count - Automated: 548 K/uL (01-06 @ 07:59)  Platelet Count - Automated: 596 K/uL (01-03 @ 22:11)      LIVER FUNCTIONS - ( 05 Jan 2022 18:50 )  Alb: 2.4 g/dL / Pro: 7.9 g/dL / ALK PHOS: 81 U/L / ALT: 30 U/L / AST: 45 U/L / GGT: x                 RADIOLOGY & ADDITIONAL STUDIES:      MICROBIOLOGY:  RECENT CULTURES:

## 2022-01-06 NOTE — PROGRESS NOTE ADULT - ASSESSMENT
NOBLE BONILLA 60 M [place] P 12/26/2021 1958 DR GALEN ROSA     REVIEW OF SYMPTOMS      Able to give ROS  Yes     RELIABLE +/-   CONSTITUTIONAL Weakness Yes  Chills No   ENDOCRINE  No heat or cold intolerance    ALLERGY No hives  Sore throat No stridor  RESP Coughing blood no  Shortness of breath YES   NEURO No Headache  Confusion Pain neck No   CARDIAC No Chest pain No Palpitations   GI  Pain abdomen NO   Vomiting NO     PHYSICAL EXAM    HEENT Unremarkable  atraumatic   RESP Fair air entry EXP prolonged    Harsh breath sound Resp distres mild   CARDIAC S1 S2 No S3     NO JVD    ABDOMEN SOFT BS PRESENT NOT DISTENDED No hepatosplenomegaly PEDAL EDEMA present No calf tenderness  NO rash     ____  DOA CC.  12/26/2021 COVID diagn 12/13  Fever cough  _____                            HOSPITAL COURSE.  COVID poa 12/26/2021   DEXA 12/26  RDVZ 12/27/2021    OXYGENATION FAILUR    BACT INFECTN   12/26/2021 zosyn    HYPOTHYROID pmh  NEURO PROBLEMS pmh  12/26/2021 aripiprazole 25  12/26/2021 carbamazepine 400.2   12/26/2021 depakot 1000.2        _____                            GOC. 12/26/2021 Full code   COVID STATUS. 12/26/2021 scv2 (+)   ICU STAY. none    PMH.  PMH GERD   PMH BPH   PMH BIPOLAR  PMH MILD INTELLECTUAL DISABILITY       BEST PRACTICE ISSUES.                                                  HEAD OF BED ELEVATION. Yes  DVT PROPHYLAXIS.     12/29 apixa 5/.2 (a f)   12/27/2021 hpsc   12/26/2021 lvnx 40   dced                                  STEIN PROPHYLAXIS.  12/26/2021 protonix 40                                                                                       DIET.     12/26/2021 puree                      INFECTION PROPHYLAXIS.    SPEECH SWALLOW RECOMMENDATIONS.  12/28/2021 soft bite      GENERAL ISSUES                                       ALLERGY.   nka                         WEIGHT.     12/26/2021 94                               BMI.                12/26/2021 32      ASSESSMENT/RECOMMENDATIONS.    RESP.   Monitor po Target po 90-95%    HEMODYNAMICS.   Monitor bp Target MAP 65 (+)  echo 12/30 n lvsf    HYTN  12/28 metoprolol 25.2     1/6/2022 2l 92%  OXYGENATION STATUS.  1/5/2022 ra 98%    RO VTE.  D-dimr 12/28 d-di 738  V duplx 12/27/2021 v duplx (-)    INFECTION.   Surveillance for bacterial infection.  No ongoing bacterial infection suspected    COVID.  Scv2 12/26/2021 (+)  Ferrn 12/27-11/28/2021 ferrn 1024 - 1173   Cr 12/26-12/27-12/28/2021 Cr 1.4 - 1.1-1.3  LFTS 12/26/2021  AP. 149  AST. 39  ALT. 53  ct 12/27/2021 ct ch periph bl opac   remdesevir 12/27/2021 remdesev  Dexa 12/26/2021 dexa 6 x 10d  a/r Monitor inflammatory markers  Monitor thrombotic markers   Monitor oxygenation    TIME SPENT   Over 25 minutes aggregate care time spent on encounter; activities included   direct patient care, counseling and/or coordinating care reviewing notes, lab data/ imaging , discussion with multidisciplinary team/ patient  /family and explaining in detail risks, benefits, alternatives  of the recommendations     NOBLE BONILLA 60 M [facility] P 12/26/2021 1958 DR GALEN ROSA

## 2022-01-06 NOTE — PROGRESS NOTE ADULT - SUBJECTIVE AND OBJECTIVE BOX
Neurology follow up note    CHAD [id number]      Interval History:    Patient feels ok no new complaints sitting in chair     Allergies    No Known Allergies    Intolerances        MEDICATIONS    acetaminophen     Tablet .. 650 milliGRAM(s) Oral every 6 hours PRN  apixaban 5 milliGRAM(s) Oral every 12 hours  ARIPiprazole 25 milliGRAM(s) Oral daily  ascorbic acid 500 milliGRAM(s) Oral daily  carBAMazepine ER Tablet 400 milliGRAM(s) Oral two times a day  cholecalciferol 2000 Unit(s) Oral two times a day  diVALproex  milliGRAM(s) Oral daily  dronabinol 2.5 milliGRAM(s) Oral two times a day  levothyroxine 100 MICROGram(s) Oral daily  levothyroxine 88 MICROGram(s) Oral daily  metoprolol tartrate 25 milliGRAM(s) Oral two times a day  multivitamin 1 Tablet(s) Oral daily  pantoprazole    Tablet 40 milliGRAM(s) Oral before breakfast  valproic acid 1000 milliGRAM(s) Oral two times a day  zinc sulfate 220 milliGRAM(s) Oral daily              Vital Signs Last 24 Hrs  T(C): 37 (06 Jan 2022 05:10), Max: 37 (06 Jan 2022 05:10)  T(F): 98.6 (06 Jan 2022 05:10), Max: 98.6 (06 Jan 2022 05:10)  HR: 72 (06 Jan 2022 05:10) (66 - 75)  BP: 120/82 (06 Jan 2022 05:10) (109/71 - 120/85)  BP(mean): --  RR: 18 (06 Jan 2022 05:10) (18 - 18)  SpO2: 92% (06 Jan 2022 05:10) (92% - 98%)    REVIEW OF SYSTEMS:  Constitutional:  The patient is limited secondary to the patient having intellectual disability, but constitutionally, she denies fever, chills, or night sweats.  Head:  No headaches.  Eyes:  No double vision or blurry vision.  No ringing in the eyes.  Ears:  No ringing in the ears.  Neck:  No neck pain.  Cardiovascular:  No chest pain.  Respiratory:  No shortness of breath at present.      PHYSICAL EXAMINATION:  HEENT:  Head:  Normocephalic, atraumatic.  Eyes:  No scleral icterus.  Ears:  Hearing appeared to be intact.  NECK:  Supple.  RESPIRATORY:  Good air entry bilaterally.  CARDIOVASCULAR:  S1 and S2 are heard.  ABDOMEN:  Soft and nontender.  EXTREMITIES:  No clubbing or cyanosis were noted.      NEUROLOGIC:  The patient awake and alert.  Extraocular movements were intact.  Speech was fluent.  The patient states he feels well.  Motor:  Bilateral upperand lower are 3+/5.  Sensory:  Bilateral upper and lower intact to light touch.  with choices was able to say hospital and name cup               LABS:  CBC Full  -  ( 06 Jan 2022 07:59 )  WBC Count : 9.49 K/uL  RBC Count : 4.76 M/uL  Hemoglobin : 14.7 g/dL  Hematocrit : 44.3 %  Platelet Count - Automated : 548 K/uL  Mean Cell Volume : 93.1 fl  Mean Cell Hemoglobin : 30.9 pg  Mean Cell Hemoglobin Concentration : 33.2 gm/dL  Auto Neutrophil # : 5.03 K/uL  Auto Lymphocyte # : 2.64 K/uL  Auto Monocyte # : 1.12 K/uL  Auto Eosinophil # : 0.08 K/uL  Auto Basophil # : 0.08 K/uL  Auto Neutrophil % : 53.1 %  Auto Lymphocyte % : 27.8 %  Auto Monocyte % : 11.8 %  Auto Eosinophil % : 0.8 %  Auto Basophil % : 0.8 %      01-06    139  |  107  |  18  ----------------------------<  90  4.4   |  26  |  0.99    Ca    9.1      06 Jan 2022 07:59    TPro  7.7  /  Alb  2.3<L>  /  TBili  0.4  /  DBili  <0.1  /  AST  43<H>  /  ALT  30  /  AlkPhos  75  01-06    Hemoglobin A1C:   Lipid Panel 01-05 @ 15:39  Total Cholesterol, Serum 124  LDL --  Triglycerides 163    LIVER FUNCTIONS - ( 06 Jan 2022 07:59 )  Alb: 2.3 g/dL / Pro: 7.7 g/dL / ALK PHOS: 75 U/L / ALT: 30 U/L / AST: 43 U/L / GGT: x           Vitamin B12         RADIOLOGY    ANALYSIS AND PLAN:  This is a 61-year-old with history of epilepsy and altered mental status.  For altered mental status, metabolic encephalopathy secondary to underlying history of COVID infection, doubt that this was a primary CNS event.  For history of epilepsy, continue the patient on his home seizure medication.  For COVID, medical treatment as needed.  Fall precautions.  at present with me verbal  and interactive 1/6  Greater than 40 minutes of time was spent with the patient, plan of care, reviewing data, and speaking to multidisciplinary healthcare team with greater than 50% of that time in counseling and care coordination.

## 2022-01-06 NOTE — PROGRESS NOTE ADULT - ASSESSMENT
Patient is a 60 year old male with PMH of bipolar disorder, intellectual developmental disability, anxiety, hypothyroidism, HLD, BPH, eczema who was diagnosed with COVID 1 week ago and was brought in for fever, cough, weakness and body aches and admitted for hypoxia due to COVID pneumonia.   +COVID on admission, negative flu  CXR reviewed, b/l infiltrates  CT chest imaging reviewed, c/w COVID  Vaccinated with Pfizer x2  NLR on admission - 4.51/0.99= 4.6    Recommendations:  12/27 - afebrile, on NC 1.5L, NLR -6.37/0.51 = 12.5. Started on dexamethasone and remdesivir. Started on pip-tazo for possible bacterial pna, will check for ferritin/pct, if ratio >1000 then would d/c pip-tazo. On heparin for vte ppx. Prone as tolerated. Monitor CBC with diff, inflammatory markers.    12/28 - febrile last night likely d/t COVID, remains on NC, continue remdesivir x5d, dexamethasone x10d. FPR ~3600 - low suspicion for superimposed bacterial pneumonia. UA negative. Follow blood cultures - if remains negative x48h - discontinue pip-tazo. Supplemental O2 as needed, wean as tolerated.    12/29 - NLR 6.58/0.65= 10, on NC 6L, fevers likely due to COVID. Continue remdesivir, dexamethasone. FPR remains >1000. Bcx NGTD x2 - if remains negative x48h, can d/c pip-tazo.   12/30 - on NC 6L, fever curve better Tm 100F in last 24h. Continue remdesivir until 12/31. Continue dexamethasone x10d. Blood and urine cultures negative - will discontinue pip-tazo. Continue supportive care. Monitor CBC, inflammatory markers, LFTs.   12/31 - c/w on supplemental O2. Continue remdesivir until 12/31. Continue dexamethasone x10d. Blood and urine cultures negative - will d/c pip-tazo. Continue supportive care.   1/1 - c/w supplemental O2. S/p remdesivir. C/w steroids.   1/2 - c/w supplemental O2. S/p remdesivir. C/w steroids x10 day course.   1/3 - remains on NC 6L, wean as tolerated, cont dexamethasone x10d. Continue supportive care   1/4 - remains on NC 6L, continue dexamethasone x10d - last day today, continue supportive care   1/5 - remains on NC 6L, afebrile, s/p dexamethasone x10d. Wean O2 as tolerated, continue supportive care   1/6 - on NC 2L, afebrile, continue supportive care, d/c planning per primary team.       Lou Tilley M.D.  [facility], Division of Infectious Diseases  [phone]  After 5pm on weekdays and all day on weekends - please call [phone] Patient is a 60 year old male with PMH of bipolar disorder, intellectual developmental disability, anxiety, hypothyroidism, HLD, BPH, eczema who was diagnosed with COVID 1 week ago and was brought in for fever, cough, weakness and body aches and admitted for hypoxia due to COVID pneumonia.   +COVID on admission, negative flu  CXR reviewed, b/l infiltrates  CT chest imaging reviewed, c/w COVID  Vaccinated with Pfizer x2  NLR on admission - 4.51/0.99= 4.6    Recommendations:  12/27 - afebrile, on NC 1.5L, NLR -6.37/0.51 = 12.5. Started on dexamethasone and remdesivir. Started on pip-tazo for possible bacterial pna, will check for ferritin/pct, if ratio >1000 then would d/c pip-tazo. On heparin for vte ppx. Prone as tolerated. Monitor CBC with diff, inflammatory markers.    12/28 - febrile last night likely d/t COVID, remains on NC, continue remdesivir x5d, dexamethasone x10d. FPR ~3600 - low suspicion for superimposed bacterial pneumonia. UA negative. Follow blood cultures - if remains negative x48h - discontinue pip-tazo. Supplemental O2 as needed, wean as tolerated.    12/29 - NLR 6.58/0.65= 10, on NC 6L, fevers likely due to COVID. Continue remdesivir, dexamethasone. FPR remains >1000. Bcx NGTD x2 - if remains negative x48h, can d/c pip-tazo.   12/30 - on NC 6L, fever curve better Tm 100F in last 24h. Continue remdesivir until 12/31. Continue dexamethasone x10d. Blood and urine cultures negative - will discontinue pip-tazo. Continue supportive care. Monitor CBC, inflammatory markers, LFTs.   12/31 - c/w on supplemental O2. Continue remdesivir until 12/31. Continue dexamethasone x10d. Blood and urine cultures negative - will d/c pip-tazo. Continue supportive care.   1/1 - c/w supplemental O2. S/p remdesivir. C/w steroids.   1/2 - c/w supplemental O2. S/p remdesivir. C/w steroids x10 day course.   1/3 - remains on NC 6L, wean as tolerated, cont dexamethasone x10d. Continue supportive care   1/4 - remains on NC 6L, continue dexamethasone x10d - last day today, continue supportive care   1/5 - remains on NC 6L, afebrile, s/p dexamethasone x10d. Wean O2 as tolerated, continue supportive care   1/6 - on NC 2L, afebrile, continue supportive care, d/c planning to [place] per primary team.     ID will sign off at this time but remains available for any further questions/concerns.    Lou Tilley M.D.  [facility], Division of Infectious Diseases  [phone]  After 5pm on weekdays and all day on weekends - please call [phone]

## 2022-01-07 VITALS
TEMPERATURE: 98 F | OXYGEN SATURATION: 93 % | SYSTOLIC BLOOD PRESSURE: 110 MMHG | HEART RATE: 67 BPM | RESPIRATION RATE: 17 BRPM | DIASTOLIC BLOOD PRESSURE: 65 MMHG

## 2022-01-07 PROCEDURE — 87637 SARSCOV2&INF A&B&RSV AMP PRB: CPT

## 2022-01-07 PROCEDURE — 85379 FIBRIN DEGRADATION QUANT: CPT

## 2022-01-07 PROCEDURE — 93880 EXTRACRANIAL BILAT STUDY: CPT

## 2022-01-07 PROCEDURE — 71045 X-RAY EXAM CHEST 1 VIEW: CPT

## 2022-01-07 PROCEDURE — 96361 HYDRATE IV INFUSION ADD-ON: CPT

## 2022-01-07 PROCEDURE — 84484 ASSAY OF TROPONIN QUANT: CPT

## 2022-01-07 PROCEDURE — 71250 CT THORAX DX C-: CPT

## 2022-01-07 PROCEDURE — 84443 ASSAY THYROID STIM HORMONE: CPT

## 2022-01-07 PROCEDURE — 70450 CT HEAD/BRAIN W/O DYE: CPT

## 2022-01-07 PROCEDURE — 87040 BLOOD CULTURE FOR BACTERIA: CPT

## 2022-01-07 PROCEDURE — 81001 URINALYSIS AUTO W/SCOPE: CPT

## 2022-01-07 PROCEDURE — 92526 ORAL FUNCTION THERAPY: CPT

## 2022-01-07 PROCEDURE — 82248 BILIRUBIN DIRECT: CPT

## 2022-01-07 PROCEDURE — 85027 COMPLETE CBC AUTOMATED: CPT

## 2022-01-07 PROCEDURE — 99285 EMERGENCY DEPT VISIT HI MDM: CPT | Mod: 25

## 2022-01-07 PROCEDURE — 87086 URINE CULTURE/COLONY COUNT: CPT

## 2022-01-07 PROCEDURE — 87635 SARS-COV-2 COVID-19 AMP PRB: CPT

## 2022-01-07 PROCEDURE — 80061 LIPID PANEL: CPT

## 2022-01-07 PROCEDURE — 82553 CREATINE MB FRACTION: CPT

## 2022-01-07 PROCEDURE — 96374 THER/PROPH/DIAG INJ IV PUSH: CPT

## 2022-01-07 PROCEDURE — 86140 C-REACTIVE PROTEIN: CPT

## 2022-01-07 PROCEDURE — 97116 GAIT TRAINING THERAPY: CPT

## 2022-01-07 PROCEDURE — U0003: CPT

## 2022-01-07 PROCEDURE — 36415 COLL VENOUS BLD VENIPUNCTURE: CPT

## 2022-01-07 PROCEDURE — U0005: CPT

## 2022-01-07 PROCEDURE — 84480 ASSAY TRIIODOTHYRONINE (T3): CPT

## 2022-01-07 PROCEDURE — 83605 ASSAY OF LACTIC ACID: CPT

## 2022-01-07 PROCEDURE — 82550 ASSAY OF CK (CPK): CPT

## 2022-01-07 PROCEDURE — 82728 ASSAY OF FERRITIN: CPT

## 2022-01-07 PROCEDURE — 93306 TTE W/DOPPLER COMPLETE: CPT

## 2022-01-07 PROCEDURE — 80076 HEPATIC FUNCTION PANEL: CPT

## 2022-01-07 PROCEDURE — 93005 ELECTROCARDIOGRAM TRACING: CPT

## 2022-01-07 PROCEDURE — 80053 COMPREHEN METABOLIC PANEL: CPT

## 2022-01-07 PROCEDURE — 97161 PT EVAL LOW COMPLEX 20 MIN: CPT

## 2022-01-07 PROCEDURE — 84145 PROCALCITONIN (PCT): CPT

## 2022-01-07 PROCEDURE — 85025 COMPLETE CBC W/AUTO DIFF WBC: CPT

## 2022-01-07 PROCEDURE — 92610 EVALUATE SWALLOWING FUNCTION: CPT

## 2022-01-07 PROCEDURE — 82565 ASSAY OF CREATININE: CPT

## 2022-01-07 PROCEDURE — 93970 EXTREMITY STUDY: CPT

## 2022-01-07 PROCEDURE — 84100 ASSAY OF PHOSPHORUS: CPT

## 2022-01-07 PROCEDURE — 85610 PROTHROMBIN TIME: CPT

## 2022-01-07 RX ADMIN — ARIPIPRAZOLE 25 MILLIGRAM(S): 15 TABLET ORAL at 11:46

## 2022-01-07 RX ADMIN — Medication 88 MICROGRAM(S): at 05:06

## 2022-01-07 RX ADMIN — DIVALPROEX SODIUM 750 MILLIGRAM(S): 500 TABLET, DELAYED RELEASE ORAL at 11:46

## 2022-01-07 RX ADMIN — Medication 500 MILLIGRAM(S): at 11:46

## 2022-01-07 RX ADMIN — Medication 1 TABLET(S): at 11:46

## 2022-01-07 RX ADMIN — Medication 25 MILLIGRAM(S): at 05:05

## 2022-01-07 RX ADMIN — Medication 100 MICROGRAM(S): at 05:04

## 2022-01-07 RX ADMIN — Medication 2000 UNIT(S): at 05:05

## 2022-01-07 RX ADMIN — APIXABAN 5 MILLIGRAM(S): 2.5 TABLET, FILM COATED ORAL at 05:05

## 2022-01-07 RX ADMIN — PANTOPRAZOLE SODIUM 40 MILLIGRAM(S): 20 TABLET, DELAYED RELEASE ORAL at 05:05

## 2022-01-07 RX ADMIN — Medication 400 MILLIGRAM(S): at 05:10

## 2022-01-07 RX ADMIN — Medication 2.5 MILLIGRAM(S): at 05:09

## 2022-01-07 RX ADMIN — Medication 1000 MILLIGRAM(S): at 05:05

## 2022-01-07 NOTE — DISCHARGE NOTE NURSING/CASE MANAGEMENT/SOCIAL WORK - NSDCPEFALRISK_GEN_ALL_CORE
For information on Fall & Injury Prevention, visit: [email or web address] OR  [email or web address] OR  https://www.cdc.gov/steadi/patient.html

## 2022-01-07 NOTE — DISCHARGE NOTE NURSING/CASE MANAGEMENT/SOCIAL WORK - PATIENT PORTAL LINK FT
You can access the FollowMyHealth Patient Portal offered by [facility] by registering at the following website: [email or web address]. By joining [facility]’s FollowMyHealth portal, you will also be able to view your health information using other applications (apps) compatible with our system.

## 2022-01-07 NOTE — DISCHARGE NOTE NURSING/CASE MANAGEMENT/SOCIAL WORK - ADD ADDITIONAL RESOURCE
Unable to reach pharmacy. Patient will stop in after work to try and  script.   Add Additional Resource

## 2022-01-07 NOTE — DISCHARGE NOTE NURSING/CASE MANAGEMENT/SOCIAL WORK - ADDITIONAL RESOURCE TYPE OF SERVICE
LISA transport to [facility] ** Both [facility] and [facility] aware that pt was COVID positive and now negative x 1 as of 1/6/2022

## 2022-01-07 NOTE — DISCHARGE NOTE NURSING/CASE MANAGEMENT/SOCIAL WORK - NSTOBACCONEVERSMOKERY/N_GEN_A
JARDIANCE 25 MG TABS tablet 30 tablet 0 4/18/2017              Last Written Prescription Date: 04/18/2017  Last Fill Quantity: 30, # refills: 0  Last Office Visit with [facility], [facility] or [facility] prescribing provider:  05/12/2017        BP Readings from Last 3 Encounters:   05/12/17 113/77   12/21/16 126/75   06/30/16 122/77     Lab Results   Component Value Date    MICROL 8 05/12/2017     Lab Results   Component Value Date    UMALCR 13.50 05/12/2017     Creatinine   Date Value Ref Range Status   05/12/2017 0.97 0.66 - 1.25 mg/dL Final   ]  GFR Estimate   Date Value Ref Range Status   05/12/2017 77 >60 mL/min/1.7m2 Final     Comment:     Non  GFR Calc   12/21/2016 80 >60 mL/min/1.7m2 Final     Comment:     Non  GFR Calc   03/23/2016 81 >60 mL/min/1.7m2 Final     Comment:     Non  GFR Calc     GFR Estimate If Black   Date Value Ref Range Status   05/12/2017 >90   GFR Calc   >60 mL/min/1.7m2 Final   12/21/2016 >90   GFR Calc   >60 mL/min/1.7m2 Final   03/23/2016 >90   GFR Calc   >60 mL/min/1.7m2 Final     Lab Results   Component Value Date    CHOL 202 05/12/2017     Lab Results   Component Value Date    HDL 54 05/12/2017     Lab Results   Component Value Date    LDL 86 05/12/2017     Lab Results   Component Value Date    TRIG 310 05/12/2017     Lab Results   Component Value Date    CHOLHDLRATIO 3.5 05/23/2013     Lab Results   Component Value Date    AST 12 05/12/2017     Lab Results   Component Value Date    ALT 21 05/12/2017     Lab Results   Component Value Date    A1C 9.2 05/12/2017    A1C 7.9 12/21/2016    A1C 7.5 03/23/2016    A1C 8.1 12/22/2015    A1C 6.8 05/18/2015     Potassium   Date Value Ref Range Status   05/12/2017 4.7 3.4 - 5.3 mmol/L Final        No

## 2022-01-07 NOTE — PROGRESS NOTE ADULT - SUBJECTIVE AND OBJECTIVE BOX
CHAD DICKEY    PLV 2NOR 242 W1    Allergies    No Known Allergies    Intolerances        PAST MEDICAL & SURGICAL HISTORY:  Hypothyroid        FAMILY HISTORY:      Home Medications:  apixaban 5 mg oral tablet: 1 tab(s) orally every 12 hours (05 Jan 2022 23:36)  ARIPiprazole 5 mg oral tablet: 5 tab(s) orally once a day (05 Jan 2022 23:36)  ascorbic acid 500 mg oral tablet: 1 tab(s) orally once a day (05 Jan 2022 23:36)  Calcium 600+D oral tablet: 1 tab(s) orally 2 times a day (13 Jun 2021 10:12)  carBAMazepine 400 mg oral tablet, extended release: 1 tab(s) orally 2 times a day (13 Jun 2021 10:12)  Cetaphil topical lotion: Apply topically to affected area 2 times a day (13 Jun 2021 10:12)  cholecalciferol oral tablet: 2000 unit(s) orally 2 times a day (05 Jan 2022 23:36)  divalproex sodium 250 mg oral tablet, extended release: 3 tab(s) orally once a day (05 Jan 2022 23:36)  dronabinol 2.5 mg oral capsule: 1 cap(s) orally 2 times a day (05 Jan 2022 23:36)  Fish Oil 1000 mg oral capsule: 1 cap(s) orally once a day (in the morning) go back to usual home dose (15 Raymond 2021 19:47)  levothyroxine 100 mcg (0.1 mg) oral tablet: 1 tab(s) orally once a day     *Takes with 88mcg (1hr apart) for a TDD; 188mcg* follow home protocol (15 Raymond 2021 19:47)  levothyroxine 88 mcg (0.088 mg) oral tablet: 1 tab(s) orally once a day    *Takes with 100mcg (1hr apart) for a TDD; 188mcg * follow home protocol (15 Raymond 2021 19:47)  metoprolol tartrate 25 mg oral tablet: 1 tab(s) orally 2 times a day hsbp less 120,,,hhr less 60 (05 Jan 2022 23:36)  Multiple Vitamins oral tablet: 1 tab(s) orally once a day (05 Jan 2022 23:36)  oxybutynin 10 mg/24 hr oral tablet, extended release: 1 tab(s) orally once a day (13 Jun 2021 10:12)  pantoprazole 40 mg oral delayed release tablet: 1 tab(s) orally once a day (before a meal) (05 Jan 2022 23:36)  tamsulosin 0.4 mg oral capsule: 1 cap(s) orally once a day (at bedtime) hsbp less 120 (05 Jan 2022 23:36)  valproic acid 250 mg oral capsule: 4 cap(s) orally 2 times a day (05 Jan 2022 23:36)  zinc sulfate 220 mg oral capsule: 1 cap(s) orally once a day (05 Jan 2022 23:36)      MEDICATIONS  (STANDING):  apixaban 5 milliGRAM(s) Oral every 12 hours  ARIPiprazole 25 milliGRAM(s) Oral daily  ascorbic acid 500 milliGRAM(s) Oral daily  carBAMazepine ER Tablet 400 milliGRAM(s) Oral two times a day  cholecalciferol 2000 Unit(s) Oral two times a day  diVALproex  milliGRAM(s) Oral daily  dronabinol 2.5 milliGRAM(s) Oral two times a day  levothyroxine 88 MICROGram(s) Oral daily  levothyroxine 100 MICROGram(s) Oral daily  metoprolol tartrate 25 milliGRAM(s) Oral two times a day  multivitamin 1 Tablet(s) Oral daily  pantoprazole    Tablet 40 milliGRAM(s) Oral before breakfast  valproic acid 1000 milliGRAM(s) Oral two times a day  zinc sulfate 220 milliGRAM(s) Oral daily    MEDICATIONS  (PRN):  acetaminophen     Tablet .. 650 milliGRAM(s) Oral every 6 hours PRN Temp greater or equal to 38C (100.4F)      Diet, Pureed:   Mildly Thick Liquids (MILDTHICKLIQS)  Supplement Feeding Modality:  Oral  Ensure Pudding Cans or Servings Per Day:  3       Frequency:  Three Times a day (01-05-22 @ 17:40) [Active]          Vital Signs Last 24 Hrs  T(C): 36.7 (07 Jan 2022 05:01), Max: 37 (06 Jan 2022 22:19)  T(F): 98 (07 Jan 2022 05:01), Max: 98.6 (06 Jan 2022 22:19)  HR: 65 (07 Jan 2022 05:01) (63 - 68)  BP: 101/64 (07 Jan 2022 05:01) (101/64 - 117/75)  BP(mean): --  RR: 18 (07 Jan 2022 05:01) (18 - 18)  SpO2: 92% (07 Jan 2022 05:01) (92% - 96%)              LABS:                        14.7   9.49  )-----------( 548      ( 06 Jan 2022 07:59 )             44.3     01-06    139  |  107  |  18  ----------------------------<  90  4.4   |  26  |  0.99    Ca    9.1      06 Jan 2022 07:59    TPro  7.7  /  Alb  2.3<L>  /  TBili  0.4  /  DBili  <0.1  /  AST  43<H>  /  ALT  30  /  AlkPhos  75  01-06              WBC:  WBC Count: 9.49 K/uL (01-06 @ 07:59)  WBC Count: 9.38 K/uL (01-03 @ 22:11)      MICROBIOLOGY:  RECENT CULTURES:                  Sodium:  Sodium, Serum: 139 mmol/L (01-06 @ 07:59)  Sodium, Serum: 140 mmol/L (01-05 @ 18:50)  Sodium, Serum: 142 mmol/L (01-03 @ 22:11)      0.99 mg/dL 01-06 @ 07:59  1.10 mg/dL 01-05 @ 18:50  0.94 mg/dL 01-05 @ 09:43  1.00 mg/dL 01-04 @ 10:18  0.98 mg/dL 01-03 @ 22:11  0.91 mg/dL 01-03 @ 11:02      Hemoglobin:  Hemoglobin: 14.7 g/dL (01-06 @ 07:59)  Hemoglobin: 14.9 g/dL (01-03 @ 22:11)      Platelets: Platelet Count - Automated: 548 K/uL (01-06 @ 07:59)  Platelet Count - Automated: 596 K/uL (01-03 @ 22:11)      LIVER FUNCTIONS - ( 06 Jan 2022 07:59 )  Alb: 2.3 g/dL / Pro: 7.7 g/dL / ALK PHOS: 75 U/L / ALT: 30 U/L / AST: 43 U/L / GGT: x                 RADIOLOGY & ADDITIONAL STUDIES:      MICROBIOLOGY:  RECENT CULTURES:

## 2022-01-07 NOTE — PROGRESS NOTE ADULT - SUBJECTIVE AND OBJECTIVE BOX
Neurology follow up note    CHAD DICKEY61yMale      Interval History:    Patient feels ok no new complaints sitting in chair    Allergies    No Known Allergies    Intolerances        MEDICATIONS    acetaminophen     Tablet .. 650 milliGRAM(s) Oral every 6 hours PRN  apixaban 5 milliGRAM(s) Oral every 12 hours  ARIPiprazole 25 milliGRAM(s) Oral daily  ascorbic acid 500 milliGRAM(s) Oral daily  carBAMazepine ER Tablet 400 milliGRAM(s) Oral two times a day  cholecalciferol 2000 Unit(s) Oral two times a day  diVALproex  milliGRAM(s) Oral daily  dronabinol 2.5 milliGRAM(s) Oral two times a day  levothyroxine 100 MICROGram(s) Oral daily  levothyroxine 88 MICROGram(s) Oral daily  metoprolol tartrate 25 milliGRAM(s) Oral two times a day  multivitamin 1 Tablet(s) Oral daily  pantoprazole    Tablet 40 milliGRAM(s) Oral before breakfast  valproic acid 1000 milliGRAM(s) Oral two times a day  zinc sulfate 220 milliGRAM(s) Oral daily              Vital Signs Last 24 Hrs  T(C): 36.8 (07 Jan 2022 11:38), Max: 37 (06 Jan 2022 22:19)  T(F): 98.2 (07 Jan 2022 11:38), Max: 98.6 (06 Jan 2022 22:19)  HR: 67 (07 Jan 2022 11:38) (63 - 68)  BP: 110/65 (07 Jan 2022 11:38) (101/64 - 117/75)  BP(mean): --  RR: 17 (07 Jan 2022 11:38) (17 - 18)  SpO2: 93% (07 Jan 2022 11:38) (92% - 96%)    REVIEW OF SYSTEMS:  Constitutional:  The patient is limited secondary to the patient having intellectual disability, but constitutionally, she denies fever, chills, or night sweats.  Head:  No headaches.  Eyes:  No double vision or blurry vision.  No ringing in the eyes.  Ears:  No ringing in the ears.  Neck:  No neck pain.  Cardiovascular:  No chest pain.  Respiratory:  No shortness of breath at present.      PHYSICAL EXAMINATION:  HEENT:  Head:  Normocephalic, atraumatic.  Eyes:  No scleral icterus.  Ears:  Hearing appeared to be intact.  NECK:  Supple.  RESPIRATORY:  Good air entry bilaterally.  CARDIOVASCULAR:  S1 and S2 are heard.  ABDOMEN:  Soft and nontender.  EXTREMITIES:  No clubbing or cyanosis were noted.      NEUROLOGIC:  The patient awake and alert.  Extraocular movements were intact.  Speech was fluent.  The patient states he feels well.  Motor:  Bilateral upperand lower are 3+/5.  Sensory:  Bilateral upper and lower intact to light touch.  with choices was able to say hospital and name cup               LABS:  CBC Full  -  ( 06 Jan 2022 07:59 )  WBC Count : 9.49 K/uL  RBC Count : 4.76 M/uL  Hemoglobin : 14.7 g/dL  Hematocrit : 44.3 %  Platelet Count - Automated : 548 K/uL  Mean Cell Volume : 93.1 fl  Mean Cell Hemoglobin : 30.9 pg  Mean Cell Hemoglobin Concentration : 33.2 gm/dL  Auto Neutrophil # : 5.03 K/uL  Auto Lymphocyte # : 2.64 K/uL  Auto Monocyte # : 1.12 K/uL  Auto Eosinophil # : 0.08 K/uL  Auto Basophil # : 0.08 K/uL  Auto Neutrophil % : 53.1 %  Auto Lymphocyte % : 27.8 %  Auto Monocyte % : 11.8 %  Auto Eosinophil % : 0.8 %  Auto Basophil % : 0.8 %      01-06    139  |  107  |  18  ----------------------------<  90  4.4   |  26  |  0.99    Ca    9.1      06 Jan 2022 07:59    TPro  7.7  /  Alb  2.3<L>  /  TBili  0.4  /  DBili  <0.1  /  AST  43<H>  /  ALT  30  /  AlkPhos  75  01-06    Hemoglobin A1C:     LIVER FUNCTIONS - ( 06 Jan 2022 07:59 )  Alb: 2.3 g/dL / Pro: 7.7 g/dL / ALK PHOS: 75 U/L / ALT: 30 U/L / AST: 43 U/L / GGT: x           Vitamin B12         RADIOLOGY      ANALYSIS AND PLAN:  This is a 61-year-old with history of epilepsy and altered mental status.  For altered mental status, metabolic encephalopathy secondary to underlying history of COVID infection, doubt that this was a primary CNS event.  For history of epilepsy, continue the patient on his home seizure medication.  For COVID, medical treatment as needed.  Fall precautions.  at present with me verbal  and interactive 1/7  neurologic wise at present appears stable   Greater than 40 minutes of time was spent with the patient, plan of care, reviewing data, and speaking to multidisciplinary healthcare team with greater than 50% of that time in counseling and care coordination.

## 2022-01-07 NOTE — PROGRESS NOTE ADULT - PROVIDER SPECIALTY LIST ADULT
Direct admit from MD office. Accepted by Dr. Canales for transfusion.  ADT signed & held, needs to be released upon pt arrival.  Written orders received, faxed to unit and scanned to media tab.  Pt coming by private car.    Pulmonology

## 2022-01-07 NOTE — PROGRESS NOTE ADULT - REASON FOR ADMISSION
low oxygen with covid 1 week now

## 2022-01-07 NOTE — PROGRESS NOTE ADULT - ASSESSMENT
NOBLE BONILLA 60 M [address] P 12/26/2021 1958 DR GALEN ROSA     REVIEW OF SYMPTOMS      Able to give ROS  Yes     RELIABLE +/-   CONSTITUTIONAL Weakness Yes  Chills No   ENDOCRINE  No heat or cold intolerance    ALLERGY No hives  Sore throat No stridor  RESP Coughing blood no  Shortness of breath YES   NEURO No Headache  Confusion Pain neck No   CARDIAC No Chest pain No Palpitations   GI  Pain abdomen NO   Vomiting NO     PHYSICAL EXAM    HEENT Unremarkable  atraumatic   RESP Fair air entry EXP prolonged    Harsh breath sound Resp distres mild   CARDIAC S1 S2 No S3     NO JVD    ABDOMEN SOFT BS PRESENT NOT DISTENDED No hepatosplenomegaly PEDAL EDEMA present No calf tenderness  NO rash     ____  DOA CC.  12/26/2021 COVID diagn 12/13  Fever cough  _____                            HOSPITAL COURSE.  COVID poa 12/26/2021   DEXA 12/26  RDVZ 12/27/2021    OXYGENATION FAILUR    BACT INFECTN   12/26/2021 zosyn    HYPOTHYROID pmh  NEURO PROBLEMS pmh  12/26/2021 aripiprazole 25  12/26/2021 carbamazepine 400.2   12/26/2021 depakot 1000.2         BEST PRACTICE ISSUES.                                                  HEAD OF BED ELEVATION. Yes  DVT PROPHYLAXIS.     12/29 apixa 5/.2 (a f)   12/27/2021 hpsc   12/26/2021 lvnx 40   dced                                  STEIN PROPHYLAXIS.  12/26/2021 protonix 40                                                                                       DIET.     12/26/2021 puree                      INFECTION PROPHYLAXIS.    SPEECH SWALLOW RECOMMENDATIONS.  12/28/2021 soft bite       ASSESSMENT/RECOMMENDATIONS.    RESP.   Monitor po Target po 90-95%    HEMODYNAMICS.   Monitor bp Target MAP 65 (+)  echo 12/30 n lvsf    HYTN  12/28 metoprolol 25.2       RO VTE.  D-dimr 12/28 d-di 738  V duplx 12/27/2021 v duplx (-)    INFECTION.   Surveillance for bacterial infection.  No ongoing bacterial infection suspected    COVID.  Scv2 12/26/2021 (+)  Ferrn 12/27-11/28/2021 ferrn 1024 - 1173   Cr 12/26-12/27-12/28/2021 Cr 1.4 - 1.1-1.3  LFTS 12/26/2021  AP. 149  AST. 39  ALT. 53  ct 12/27/2021 ct ch periph bl opac   remdesevir 12/27/2021 remdesev  Dexa 12/26/2021 dexa 6 x 10d  a/r Monitor inflammatory markers  Monitor thrombotic markers   Monitor oxygenation    TIME SPENT   Over 25 minutes aggregate care time spent on encounter; activities included   direct patient care, counseling and/or coordinating care reviewing notes, lab data/ imaging , discussion with multidisciplinary team/ patient  /family and explaining in detail risks, benefits, alternatives  of the recommendations     NOBLE BONILLA 60 M [address] P 12/26/2021 1958 DR GALEN ROSA

## 2022-03-10 NOTE — SWALLOW BEDSIDE ASSESSMENT ADULT - SWALLOW EVAL: MANDIBULAR STRENGTH AND MOBILITY
Surgical Discharge Instructions    Ways to Take Care of Yourself    Activity   1. Take short walks around the house three times a day.  Rest when you get tired.   2. NO HEAVY LIFTING!  Do not lift over 20 pounds for 6 weeks from date of surgery.   3. Do ankle pumps and ankle circles throughout the day.    Dressing/Incision   1. Keep dressing clean and dry.  OK to shower.  Do not soak in a tub or swim in a pool until seen in follow up.    Call Your Doctor if You Have Any of the Following (Red Flags):   1. Increasing pain, redness, swelling, or drainage from incision.   2. Fever over 101°F for longer than 24 hours.     3. Trouble breathing or chest pain.   4. Pain, redness, or swelling in your legs.   5. Nausea, vomiting, or diarrhea that lasts longer than 24 hours.    Physician: Lilian Nevarez MD   Office Phone: [phone]  Office Address: [address] [place] [address], [address]        [place] [address]       As part of our commitment to your overall care and recovery, we would like to check on you after your discharge.  You may receive intermittent non-urgent calls for approximately 30 days after your discharge to:   -Check on your recovery  -Assist with arranging follow-up appointments  -Answer prescription related questions  -Assist with other questions that you may have about your care    We understand that you are in your recovery period and these calls should be brief.    Thank you.  Please call if you have any questions.  Esther Wells RN, CPQ, [facility]  Transition of Care Nurse  [phone]      
intact
patient

## 2022-10-17 NOTE — ED ADULT NURSE NOTE - NSIMPLEMENTINTERV_GEN_ALL_ED
See treatment section for initial evaluation.    Moises Tatum, PT, DPT   
Implemented All Universal Safety Interventions:  [place] to call system. Call bell, personal items and telephone within reach. Instruct patient to call for assistance. Room bathroom lighting operational. Non-slip footwear when patient is off stretcher. Physically safe environment: no spills, clutter or unnecessary equipment. Stretcher in lowest position, wheels locked, appropriate side rails in place.

## 2022-10-24 NOTE — DISCHARGE NOTE PROVIDER - NSDCCPCAREPLAN_GEN_ALL_CORE_FT
PRINCIPAL DISCHARGE DIAGNOSIS  Diagnosis: Acute respiratory distress syndrome (ARDS) due to COVID-19 virus  Assessment and Plan of Treatment: treated with dexa and remedesivir  given iv abxs for aspiration pneumonia  sp afib flutter,,echo stable,,,cardio placed eliquis  changed diet ftrom soft minced to puree since patient couldnt tolerate  added marinol  physical therepay seen  oxygen 2 liters  couldnt go back to group home + and couldnt further care for him      
stretcher

## 2023-09-13 NOTE — PROGRESS NOTE ADULT - SUBJECTIVE AND OBJECTIVE BOX
[facility] Cardiology    CHIEF COMPLAINT: Patient is a 61y old  Male who presents with a chief complaint of low oxygen with covid 1 week now (07 Jan 2022 07:38)      Follow Up: [ ] Chest Pain      [ ] Dyspnea     [ ] Palpitations    [ ] Atrial Fibrillation     [ ] Ventricular Dysrhythmia    [ ] Abnormal EKG                      [ ] Abnormal Cardiac Enzymes     [ ] Valvular Disease    HPI:    PAST MEDICAL & SURGICAL HISTORY:  Hypothyroid      MEDICATIONS  (STANDING):  apixaban 5 milliGRAM(s) Oral every 12 hours  ARIPiprazole 25 milliGRAM(s) Oral daily  ascorbic acid 500 milliGRAM(s) Oral daily  carBAMazepine ER Tablet 400 milliGRAM(s) Oral two times a day  cholecalciferol 2000 Unit(s) Oral two times a day  diVALproex  milliGRAM(s) Oral daily  dronabinol 2.5 milliGRAM(s) Oral two times a day  levothyroxine 88 MICROGram(s) Oral daily  levothyroxine 100 MICROGram(s) Oral daily  metoprolol tartrate 25 milliGRAM(s) Oral two times a day  multivitamin 1 Tablet(s) Oral daily  pantoprazole    Tablet 40 milliGRAM(s) Oral before breakfast  valproic acid 1000 milliGRAM(s) Oral two times a day  zinc sulfate 220 milliGRAM(s) Oral daily    MEDICATIONS  (PRN):  acetaminophen     Tablet .. 650 milliGRAM(s) Oral every 6 hours PRN Temp greater or equal to 38C (100.4F)    Allergies    No Known Allergies    Intolerances        REVIEW OF SYSTEMS:    CONSTITUTIONAL: No weakness, fevers or chills.   EYES/ENT: No visual changes;    NECK: No pain or stiffness  RESPIRATORY: No cough, wheezing, No shortness of breath  CARDIOVASCULAR: No chest pain or palpitations  GASTROINTESTINAL: No abdominal pain, or hematochezia.  GENITOURINARY: No dysuria orhematuria  NEUROLOGICAL: No numbness or weakness  SKIN: No itching, burning, rashes  All other review of systems is negative unless indicated above    Vital Signs Last 24 Hrs  T(C): 36.7 (07 Jan 2022 05:01), Max: 37 (06 Jan 2022 22:19)  T(F): 98 (07 Jan 2022 05:01), Max: 98.6 (06 Jan 2022 22:19)  HR: 65 (07 Jan 2022 05:01) (63 - 68)  BP: 101/64 (07 Jan 2022 05:01) (101/64 - 117/75)  BP(mean): --  RR: 18 (07 Jan 2022 05:01) (18 - 18)  SpO2: 92% (07 Jan 2022 05:01) (92% - 96%)  I&O's Summary      PHYSICAL EXAM:  Constitutional: NAD  Neurological: Alert, no focal deficits  HEENT: no JVD, EOMI  Cardiovascular: Regular, S1 and S2, no murmur  Pulmonary: breath sounds bilaterally  Gastrointestinal: Bowel Sounds present, soft, nontender  EXT:  no peripheral edema  Skin: No rashes.  Psych:  Mood calm  LABS: All Labs Reviewed:                          14.7   9.49  )-----------( 548      ( 06 Jan 2022 07:59 )             44.3     01-06    139  |  107  |  18  ----------------------------<  90  4.4   |  26  |  0.99    Ca    9.1      06 Jan 2022 07:59    TPro  7.7  /  Alb  2.3<L>  /  TBili  0.4  /  DBili  <0.1  /  AST  43<H>  /  ALT  30  /  AlkPhos  75  01-06          CT Head No Cont:   ACC: [id number] EXAM:  CT BRAIN                          PROCEDURE DATE:  01/05/2022          INTERPRETATION:  Noncontrast CT of the brain.    CLINICAL INDICATION:  Confusion, COVID, aspiration pneumonia    TECHNIQUE : Axial CT scanning of the brain was obtained from the skull   base to the vertex without the administration of intravenous contrast.   Sagittal and coronal reformats were provided.    COMPARISON: None available    FINDINGS:    Study is limited by motion.    No hydrocephalus, mass effect, midline shift, acute intracranial   hemorrhage, or brain edema.    Visualized paranasal sinuses and mastoid air cells are clear.    IMPRESSION:    No hydrocephalus, acute intracranial hemorrhage, mass effect, or brain   edema.    --- End of Report ---    · Assessment	  61 m hypothyroid a/w COVID PNA  s/p IV ABX, antivirals  s/p heparin, abx    -S/p Atrial flutter on admission  spontaneously converted to NSR.  s/p heparin. now on Eliquis 5 BID  BP/HR stable on metoprolol 25 mg BID.   Hgb stable on AC.     Will follow prn              TROY HERRERA MD; Attending Radiologist  This document has been electronically signed. Jan 5 2022 12:07PM (01-05-22 @ 11:32)     Consent: The rationale for the repair was explained to the patient and consent was obtained. The risks, benefits and alternatives to therapy were discussed in detail. Specifically, the risks of infection, scarring, bleeding, prolonged wound healing, incomplete removal, allergy to anesthesia, nerve injury and recurrence were addressed. Prior to the procedure, the treatment site was clearly identified and confirmed by the patient. All components of Universal Protocol/PAUSE Rule completed.

## 2024-05-25 NOTE — DIETITIAN INITIAL EVALUATION ADULT. - HEIGHT FOR BMI (FEET)
Pt engaged well in therapy this date.     Problem: Occupational Therapy  Goal: Occupational Therapy Goal  Description: Goals to be met by: 6/25/24     Patient will increase functional independence with ADLs by performing:    UE Dressing with Stand-by Assistance.  LE Dressing with Contact Guard Assistance.  Grooming while standing at sink with Stand-by Assistance.  Toileting from toilet with Stand-by Assistance for hygiene and clothing management.   Supine to sit with Stand-by Assistance.  Step transfer with Supervision  Toilet transfer to toilet with Stand-by Assistance.    Outcome: Progressing      5